# Patient Record
Sex: MALE | Race: WHITE | NOT HISPANIC OR LATINO | Employment: FULL TIME | URBAN - METROPOLITAN AREA
[De-identification: names, ages, dates, MRNs, and addresses within clinical notes are randomized per-mention and may not be internally consistent; named-entity substitution may affect disease eponyms.]

---

## 2022-11-15 ENCOUNTER — OFFICE VISIT (OUTPATIENT)
Dept: FAMILY MEDICINE CLINIC | Facility: CLINIC | Age: 42
End: 2022-11-15

## 2022-11-15 VITALS
HEART RATE: 102 BPM | BODY MASS INDEX: 25.62 KG/M2 | TEMPERATURE: 97.8 F | DIASTOLIC BLOOD PRESSURE: 58 MMHG | OXYGEN SATURATION: 98 % | SYSTOLIC BLOOD PRESSURE: 92 MMHG | RESPIRATION RATE: 18 BRPM | WEIGHT: 173 LBS | HEIGHT: 69 IN

## 2022-11-15 DIAGNOSIS — E11.65 TYPE 2 DIABETES MELLITUS WITH HYPERGLYCEMIA, WITHOUT LONG-TERM CURRENT USE OF INSULIN: Primary | ICD-10-CM

## 2022-11-15 DIAGNOSIS — L02.414 CUTANEOUS ABSCESS OF LEFT UPPER EXTREMITY: ICD-10-CM

## 2022-11-15 PROCEDURE — 99204 OFFICE O/P NEW MOD 45 MIN: CPT | Performed by: NURSE PRACTITIONER

## 2022-11-15 RX ORDER — AMOXICILLIN AND CLAVULANATE POTASSIUM 875; 125 MG/1; MG/1
1 TABLET, FILM COATED ORAL EVERY 12 HOURS SCHEDULED
Qty: 20 TABLET | Refills: 0 | Status: SHIPPED | OUTPATIENT
Start: 2022-11-15 | End: 2022-11-16 | Stop reason: SDUPTHER

## 2022-11-15 NOTE — PROGRESS NOTES
Chief Complaint  Establish Care (Treatment for diabetes )    Subjective          Shayy presents to [facility]  Is a 41-year-old male to establish care, obtain labs, follow-up on diabetes.  Overall doing well    Shayy Ellison 41 y.o. male who presents today for routine follow up check and medication refills.  he has a history of There is no problem list on file for this patient.  .  Since the last visit, he has overall felt well.  he has been compliant with current meds.  he denies medication side effects.  Glucose control with medication is control uncertain   The last HgbA1C result was No results found for: HGBA1C.  The last dilated eye exam was 2020  States that he has not been to the doctor in several years.  Is not currently taking any medication for his diabetes.  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  States that he does check his blood sugar occasionally its been around 150 when he checks it  He denies any polyuria polydipsia or polyphagia  He is unsure what medication he was on in the past    He does have a swollen bump on his left elbow.  He states that he was in bed a couple of nights ago and felt something on his elbow he is unsure if he got bit or hit his elbow on something.  He has had some redness and swelling no drainage or fever.  He has not taken any medicine for this    He denies any other significant medical history  He has not had any recent lab work is fasting today and agreeable to labs    The following portions of the patient's history were reviewed and updated as appropriate: allergies, current medications, past family history, past medical history, past social history, past surgical history, and problem list        Review of Systems   Constitutional: Negative for chills, fatigue and fever.   Eyes: Negative for visual disturbance.   Respiratory: Negative for cough, shortness of breath and wheezing.   "  Cardiovascular: Negative for chest pain, palpitations and leg swelling.   Gastrointestinal: Negative for abdominal pain, diarrhea, nausea and vomiting.   Endocrine: Negative for polydipsia, polyphagia and polyuria.   Skin: Positive for wound.   Neurological: Negative for dizziness and light-headedness.        Objective   Vital Signs:   Vitals:    11/15/22 0932   BP: 92/58   Pulse: 102   Resp: 18   Temp: 97.8 °F (36.6 °C)   SpO2: 98%   Weight: 78.5 kg (173 lb)   Height: 175.3 cm (69\")        BMI is >= 25 and <30. (Overweight) The following options were offered after discussion;: weight loss educational material (shared in after visit summary)        Physical Exam  Vitals reviewed.   Constitutional:       General: He is not in acute distress.  Eyes:      Conjunctiva/sclera: Conjunctivae normal.   Neck:      Thyroid: No thyromegaly.      Vascular: No carotid bruit.   Cardiovascular:      Rate and Rhythm: Normal rate and regular rhythm.      Heart sounds: Normal heart sounds.   Pulmonary:      Effort: Pulmonary effort is normal.      Breath sounds: Normal breath sounds.   Skin:     Findings: Abscess present.             Comments: Small skin abscess mild erythema and swelling around site.  No warmth or fever or drainage   Neurological:      Mental Status: He is alert.   Psychiatric:         Attention and Perception: Attention normal.         Mood and Affect: Mood normal.          Result Review :     The following data was reviewed by: ZULY Ashley on 11/15/2022:  No recent labs to review fasting and agreeable today         Assessment and Plan    Diagnoses and all orders for this visit:    1. Type 2 diabetes mellitus with hyperglycemia, without long-term current use of insulin (HCC) (Primary)  Comments:  Check hemoglobin A1c today  Monitor blood sugars closely bring log to next visit  Orders:  -     Comprehensive Metabolic Panel  -     CBC & Differential  -     Lipid Panel  -     TSH  -     T4, Free  -     " Hemoglobin A1c    2. Cutaneous abscess of left upper extremity  Comments:  Take medication as directed  Follow-up immediately with any fever increased swelling redness or drainage  Orders:  -     Comprehensive Metabolic Panel  -     CBC & Differential  -     Lipid Panel  -     TSH  -     T4, Free  -     Hemoglobin A1c  -     amoxicillin-clavulanate (Augmentin) 875-125 MG per tablet; Take 1 tablet by mouth Every 12 (Twelve) Hours. One PO BID for infection with food  Dispense: 20 tablet; Refill: 0        Follow Up   Return in about 6 months (around 5/15/2023), or if symptoms worsen or fail to improve, for Annual physical.  Patient was given instructions and counseling regarding his condition or for health maintenance advice. Please see specific information pulled into the AVS if appropriate.

## 2022-11-16 ENCOUNTER — TELEPHONE (OUTPATIENT)
Dept: FAMILY MEDICINE CLINIC | Facility: CLINIC | Age: 42
End: 2022-11-16

## 2022-11-16 ENCOUNTER — DOCUMENTATION (OUTPATIENT)
Dept: FAMILY MEDICINE CLINIC | Facility: CLINIC | Age: 42
End: 2022-11-16

## 2022-11-16 DIAGNOSIS — E11.65 TYPE 2 DIABETES MELLITUS WITH HYPERGLYCEMIA, WITHOUT LONG-TERM CURRENT USE OF INSULIN: ICD-10-CM

## 2022-11-16 DIAGNOSIS — L02.414 CUTANEOUS ABSCESS OF LEFT UPPER EXTREMITY: ICD-10-CM

## 2022-11-16 LAB
ALBUMIN SERPL-MCNC: 4.7 G/DL (ref 3.5–5.2)
ALBUMIN/GLOB SERPL: 2.2 G/DL
ALP SERPL-CCNC: 70 U/L (ref 39–117)
ALT SERPL-CCNC: 7 U/L (ref 1–41)
AST SERPL-CCNC: 13 U/L (ref 1–40)
BASOPHILS # BLD AUTO: 0.06 10*3/MM3 (ref 0–0.2)
BASOPHILS NFR BLD AUTO: 0.3 % (ref 0–1.5)
BILIRUB SERPL-MCNC: 2.9 MG/DL (ref 0–1.2)
BUN SERPL-MCNC: 12 MG/DL (ref 6–20)
BUN/CREAT SERPL: 8.5 (ref 7–25)
CALCIUM SERPL-MCNC: 8.9 MG/DL (ref 8.6–10.5)
CHLORIDE SERPL-SCNC: 90 MMOL/L (ref 98–107)
CHOLEST SERPL-MCNC: 215 MG/DL (ref 0–200)
CO2 SERPL-SCNC: 24.6 MMOL/L (ref 22–29)
CREAT SERPL-MCNC: 1.41 MG/DL (ref 0.76–1.27)
EGFRCR SERPLBLD CKD-EPI 2021: 64.6 ML/MIN/1.73
EOSINOPHIL # BLD AUTO: 0.11 10*3/MM3 (ref 0–0.4)
EOSINOPHIL NFR BLD AUTO: 0.6 % (ref 0.3–6.2)
ERYTHROCYTE [DISTWIDTH] IN BLOOD BY AUTOMATED COUNT: 12.6 % (ref 12.3–15.4)
GLOBULIN SER CALC-MCNC: 2.1 GM/DL
GLUCOSE SERPL-MCNC: 434 MG/DL (ref 65–99)
HBA1C MFR BLD: 10.3 % (ref 4.8–5.6)
HCT VFR BLD AUTO: 48.5 % (ref 37.5–51)
HDLC SERPL-MCNC: 36 MG/DL (ref 40–60)
HGB BLD-MCNC: 16.4 G/DL (ref 13–17.7)
IMM GRANULOCYTES # BLD AUTO: 0.08 10*3/MM3 (ref 0–0.05)
IMM GRANULOCYTES NFR BLD AUTO: 0.4 % (ref 0–0.5)
LDLC SERPL CALC-MCNC: 130 MG/DL (ref 0–100)
LYMPHOCYTES # BLD AUTO: 1.49 10*3/MM3 (ref 0.7–3.1)
LYMPHOCYTES NFR BLD AUTO: 8.3 % (ref 19.6–45.3)
MCH RBC QN AUTO: 29.8 PG (ref 26.6–33)
MCHC RBC AUTO-ENTMCNC: 33.8 G/DL (ref 31.5–35.7)
MCV RBC AUTO: 88.2 FL (ref 79–97)
MONOCYTES # BLD AUTO: 1.11 10*3/MM3 (ref 0.1–0.9)
MONOCYTES NFR BLD AUTO: 6.2 % (ref 5–12)
NEUTROPHILS # BLD AUTO: 15.08 10*3/MM3 (ref 1.7–7)
NEUTROPHILS NFR BLD AUTO: 84.2 % (ref 42.7–76)
NRBC BLD AUTO-RTO: 0 /100 WBC (ref 0–0.2)
PLATELET # BLD AUTO: 227 10*3/MM3 (ref 140–450)
POTASSIUM SERPL-SCNC: 4.3 MMOL/L (ref 3.5–5.2)
PROT SERPL-MCNC: 6.8 G/DL (ref 6–8.5)
RBC # BLD AUTO: 5.5 10*6/MM3 (ref 4.14–5.8)
SODIUM SERPL-SCNC: 132 MMOL/L (ref 136–145)
T4 FREE SERPL-MCNC: 1.53 NG/DL (ref 0.93–1.7)
TRIGL SERPL-MCNC: 272 MG/DL (ref 0–150)
TSH SERPL DL<=0.005 MIU/L-ACNC: 0.22 UIU/ML (ref 0.27–4.2)
VLDLC SERPL CALC-MCNC: 49 MG/DL (ref 5–40)
WBC # BLD AUTO: 17.93 10*3/MM3 (ref 3.4–10.8)

## 2022-11-16 RX ORDER — METFORMIN HYDROCHLORIDE 500 MG/1
500 TABLET, EXTENDED RELEASE ORAL 2 TIMES DAILY WITH MEALS
Qty: 180 TABLET | Refills: 1 | Status: SHIPPED | OUTPATIENT
Start: 2022-11-16 | End: 2022-11-16 | Stop reason: SDUPTHER

## 2022-11-16 RX ORDER — METFORMIN HYDROCHLORIDE 500 MG/1
500 TABLET, EXTENDED RELEASE ORAL 2 TIMES DAILY WITH MEALS
Qty: 180 TABLET | Refills: 1 | Status: SHIPPED | OUTPATIENT
Start: 2022-11-16 | End: 2023-03-27 | Stop reason: SDUPTHER

## 2022-11-16 RX ORDER — AMOXICILLIN AND CLAVULANATE POTASSIUM 875; 125 MG/1; MG/1
1 TABLET, FILM COATED ORAL EVERY 12 HOURS SCHEDULED
Qty: 20 TABLET | Refills: 0 | Status: SHIPPED | OUTPATIENT
Start: 2022-11-16 | End: 2023-03-27

## 2022-11-16 NOTE — PROGRESS NOTES
Glucose 434 left phone message go to ER if sweats , confusion, polydipsia, polyuria. Glucose critical high

## 2022-11-16 NOTE — TELEPHONE ENCOUNTER
Caller: Shayy Ellison    Relationship: Self    Best call back number:[phone]    What is the best time to reach you: ANYTIME  Who are you requesting to speak with (clinical staff, provider,  specific staff member): CLINICAL STAFF  Do you know the name of the person who called: SELF  What was the call regarding: PATIENT CALLED AND STATED HE WOULD LIKE TO KNOW WHAT MEDICINE YOU CALLED IN TO HIS PHARMACY.   Do you require a callback: YES

## 2023-03-27 ENCOUNTER — TELEPHONE (OUTPATIENT)
Dept: ENDOCRINOLOGY | Age: 43
End: 2023-03-27

## 2023-03-27 ENCOUNTER — OFFICE VISIT (OUTPATIENT)
Dept: ENDOCRINOLOGY | Age: 43
End: 2023-03-27
Payer: COMMERCIAL

## 2023-03-27 VITALS
SYSTOLIC BLOOD PRESSURE: 110 MMHG | WEIGHT: 163 LBS | BODY MASS INDEX: 24.14 KG/M2 | HEIGHT: 69 IN | DIASTOLIC BLOOD PRESSURE: 70 MMHG | OXYGEN SATURATION: 95 % | HEART RATE: 87 BPM | TEMPERATURE: 96.2 F

## 2023-03-27 DIAGNOSIS — E11.9 TYPE 2 DIABETES MELLITUS WITHOUT COMPLICATION, WITHOUT LONG-TERM CURRENT USE OF INSULIN: Primary | ICD-10-CM

## 2023-03-27 DIAGNOSIS — E11.65 TYPE 2 DIABETES MELLITUS WITH HYPERGLYCEMIA, WITHOUT LONG-TERM CURRENT USE OF INSULIN: ICD-10-CM

## 2023-03-27 DIAGNOSIS — E11.65 TYPE 2 DIABETES MELLITUS WITH HYPERGLYCEMIA, WITHOUT LONG-TERM CURRENT USE OF INSULIN: Primary | ICD-10-CM

## 2023-03-27 DIAGNOSIS — N52.9 ERECTILE DYSFUNCTION, UNSPECIFIED ERECTILE DYSFUNCTION TYPE: ICD-10-CM

## 2023-03-27 PROCEDURE — 99244 OFF/OP CNSLTJ NEW/EST MOD 40: CPT | Performed by: INTERNAL MEDICINE

## 2023-03-27 RX ORDER — METFORMIN HYDROCHLORIDE 500 MG/1
1000 TABLET, EXTENDED RELEASE ORAL
Qty: 180 TABLET | Refills: 1 | Status: SHIPPED | OUTPATIENT
Start: 2023-03-27 | End: 2023-09-23

## 2023-03-27 RX ORDER — CANAGLIFLOZIN 100 MG/1
100 TABLET, FILM COATED ORAL DAILY
Qty: 30 TABLET | Refills: 5 | Status: SHIPPED | OUTPATIENT
Start: 2023-03-27

## 2023-03-27 RX ORDER — DAPAGLIFLOZIN 5 MG/1
5 TABLET, FILM COATED ORAL DAILY
Qty: 90 TABLET | Refills: 1 | Status: SHIPPED | OUTPATIENT
Start: 2023-03-27 | End: 2023-03-27 | Stop reason: CLARIF

## 2023-03-27 RX ORDER — SILDENAFIL 50 MG/1
50 TABLET, FILM COATED ORAL DAILY PRN
Qty: 90 TABLET | Refills: 1 | Status: SHIPPED | OUTPATIENT
Start: 2023-03-27

## 2023-03-27 NOTE — TELEPHONE ENCOUNTER
[facility] CALLED AND IS REQUESTING AN ALT MED FOR THE [facility] DUE TO THE PT DOES NOT HAVE ANY PHARMACY COVERAGE AND HE WILL HAVE TO PAY OVER $500.00 FOR THE FARXIGA.          [facility] [address] [place] [address] [phone] [facility] [phone] FX          room air

## 2023-03-27 NOTE — PROGRESS NOTES
Referring provider: Una Gonsales    Reason for consult:  T2DM    HPI:  - 42 year old male here for management of diabetes mellitus type 2  - Has had diabetes since 2011  - No known complications to date  - Is currently taking metformin 500 mg XR  - He states he has not managed his diabetes since his diagnosis and typically not taken his prescribed medications but recently he is taking his medication  - He complains of epigastric pain relieved by PPI, poor healing, and erectile dysfunction  - He smokes 4 packs per day  - He states his mother, father, and siblings all have diabetes mellitus      The following portions of the patient's history were reviewed and updated as appropriate: allergies, current medications, past family history, past medical history, past social history, past surgical history and problem list.    Review of Systems   Constitutional: Positive for fatigue.   Respiratory: Negative.    Cardiovascular: Negative.    Gastrointestinal: Positive for abdominal pain.   Genitourinary: Positive for erectile dysfunction.   Psychiatric/Behavioral: The patient is nervous/anxious.        Objective     Vitals:    03/27/23 0853   BP: 110/70   Pulse: 87   Temp: 96.2 °F (35.7 °C)   SpO2: 95%        Physical Exam  Constitutional:       Appearance: Normal appearance.   HENT:      Head: Normocephalic and atraumatic.   Eyes:      General: No scleral icterus.     Conjunctiva/sclera: Conjunctivae normal.   Pulmonary:      Effort: Pulmonary effort is normal. No respiratory distress.   Neurological:      Mental Status: He is alert.      Gait: Gait normal.   Psychiatric:         Mood and Affect: Mood normal.         Behavior: Behavior normal.         Thought Content: Thought content normal.         Labs/Imaging:  A1c was 10.3% and TSH was 0.217 in 11/2022    Assessment & Plan   1. Uncontrolled type 2 DM due to hyperglycemia  - Cont. Metformin XR 1 g daily  - Start Farxiga 5 mg daily (discussed side effects of  SGLT2 inhibitors)  - Discussed dietary changes to help with glycemic control    2. ED  - Perscribed sildenafil  - His poor glycemic control  And smoking is contributing    3. Smoking  - Discussed smoking cessation    - Return to clinic in 1 month

## 2023-03-28 ENCOUNTER — PATIENT ROUNDING (BHMG ONLY) (OUTPATIENT)
Dept: ENDOCRINOLOGY | Age: 43
End: 2023-03-28
Payer: COMMERCIAL

## 2023-12-07 ENCOUNTER — TELEPHONE (OUTPATIENT)
Dept: FAMILY MEDICINE CLINIC | Facility: CLINIC | Age: 43
End: 2023-12-07
Payer: COMMERCIAL

## 2023-12-07 ENCOUNTER — OFFICE VISIT (OUTPATIENT)
Dept: FAMILY MEDICINE CLINIC | Facility: CLINIC | Age: 43
End: 2023-12-07
Payer: COMMERCIAL

## 2023-12-07 ENCOUNTER — TELEPHONE (OUTPATIENT)
Dept: FAMILY MEDICINE CLINIC | Facility: CLINIC | Age: 43
End: 2023-12-07

## 2023-12-07 VITALS
HEIGHT: 69 IN | OXYGEN SATURATION: 97 % | TEMPERATURE: 97.9 F | DIASTOLIC BLOOD PRESSURE: 80 MMHG | WEIGHT: 175.8 LBS | HEART RATE: 95 BPM | BODY MASS INDEX: 26.04 KG/M2 | SYSTOLIC BLOOD PRESSURE: 126 MMHG

## 2023-12-07 DIAGNOSIS — K21.9 GASTROESOPHAGEAL REFLUX DISEASE, UNSPECIFIED WHETHER ESOPHAGITIS PRESENT: ICD-10-CM

## 2023-12-07 DIAGNOSIS — N52.9 ERECTILE DYSFUNCTION, UNSPECIFIED ERECTILE DYSFUNCTION TYPE: ICD-10-CM

## 2023-12-07 DIAGNOSIS — E11.65 TYPE 2 DIABETES MELLITUS WITH HYPERGLYCEMIA, WITHOUT LONG-TERM CURRENT USE OF INSULIN: ICD-10-CM

## 2023-12-07 DIAGNOSIS — K21.9 GASTROESOPHAGEAL REFLUX DISEASE, UNSPECIFIED WHETHER ESOPHAGITIS PRESENT: Primary | ICD-10-CM

## 2023-12-07 PROCEDURE — 99214 OFFICE O/P EST MOD 30 MIN: CPT | Performed by: NURSE PRACTITIONER

## 2023-12-07 RX ORDER — METFORMIN HYDROCHLORIDE 500 MG/1
1000 TABLET, EXTENDED RELEASE ORAL 2 TIMES DAILY WITH MEALS
Qty: 360 TABLET | Refills: 1 | Status: SHIPPED | OUTPATIENT
Start: 2023-12-07 | End: 2024-06-04

## 2023-12-07 RX ORDER — CANAGLIFLOZIN 100 MG/1
100 TABLET, FILM COATED ORAL DAILY
Qty: 90 TABLET | Refills: 2 | Status: SHIPPED | OUTPATIENT
Start: 2023-12-07 | End: 2023-12-07 | Stop reason: SDUPTHER

## 2023-12-07 RX ORDER — FLASH GLUCOSE SCANNING READER
1 EACH MISCELLANEOUS TAKE AS DIRECTED
Qty: 1 EACH | Refills: 0 | Status: SHIPPED | OUTPATIENT
Start: 2023-12-07

## 2023-12-07 RX ORDER — METFORMIN HYDROCHLORIDE 500 MG/1
1000 TABLET, EXTENDED RELEASE ORAL
Qty: 180 TABLET | Refills: 1 | Status: SHIPPED | OUTPATIENT
Start: 2023-12-07 | End: 2023-12-07 | Stop reason: SDUPTHER

## 2023-12-07 RX ORDER — SEMAGLUTIDE 1.34 MG/ML
0.5 INJECTION, SOLUTION SUBCUTANEOUS WEEKLY
Qty: 3 ML | Refills: 2 | Status: SHIPPED | OUTPATIENT
Start: 2023-12-07 | End: 2023-12-07 | Stop reason: SDUPTHER

## 2023-12-07 RX ORDER — OMEPRAZOLE 20 MG/1
20 CAPSULE, DELAYED RELEASE ORAL NIGHTLY
Qty: 90 CAPSULE | Refills: 1 | Status: SHIPPED | OUTPATIENT
Start: 2023-12-07 | End: 2024-06-04

## 2023-12-07 RX ORDER — TADALAFIL 10 MG/1
10 TABLET ORAL DAILY PRN
Qty: 30 TABLET | Refills: 1 | Status: SHIPPED | OUTPATIENT
Start: 2023-12-07 | End: 2023-12-07 | Stop reason: SDUPTHER

## 2023-12-07 RX ORDER — SILDENAFIL 50 MG/1
50 TABLET, FILM COATED ORAL DAILY PRN
Qty: 90 TABLET | Refills: 1 | Status: SHIPPED | OUTPATIENT
Start: 2023-12-07 | End: 2023-12-07

## 2023-12-07 RX ORDER — FLASH GLUCOSE SCANNING READER
1 EACH MISCELLANEOUS TAKE AS DIRECTED
Qty: 1 EACH | Refills: 0 | Status: SHIPPED | OUTPATIENT
Start: 2023-12-07 | End: 2023-12-07 | Stop reason: SDUPTHER

## 2023-12-07 RX ORDER — OMEPRAZOLE 20 MG/1
20 CAPSULE, DELAYED RELEASE ORAL NIGHTLY
Qty: 90 CAPSULE | Refills: 1 | Status: SHIPPED | OUTPATIENT
Start: 2023-12-07 | End: 2023-12-07 | Stop reason: SDUPTHER

## 2023-12-07 RX ORDER — TADALAFIL 10 MG/1
10 TABLET ORAL DAILY PRN
Qty: 30 TABLET | Refills: 1 | Status: SHIPPED | OUTPATIENT
Start: 2023-12-07 | End: 2024-06-04

## 2023-12-07 RX ORDER — CANAGLIFLOZIN 100 MG/1
100 TABLET, FILM COATED ORAL DAILY
Qty: 90 TABLET | Refills: 2 | Status: SHIPPED | OUTPATIENT
Start: 2023-12-07 | End: 2024-09-02

## 2023-12-07 RX ORDER — METFORMIN HYDROCHLORIDE 500 MG/1
1000 TABLET, EXTENDED RELEASE ORAL 2 TIMES DAILY WITH MEALS
Qty: 360 TABLET | Refills: 1 | Status: SHIPPED | OUTPATIENT
Start: 2023-12-07 | End: 2023-12-07 | Stop reason: SDUPTHER

## 2023-12-07 RX ORDER — SEMAGLUTIDE 1.34 MG/ML
0.5 INJECTION, SOLUTION SUBCUTANEOUS WEEKLY
Qty: 3 ML | Refills: 2 | Status: SHIPPED | OUTPATIENT
Start: 2023-12-07 | End: 2024-06-04

## 2023-12-07 NOTE — PROGRESS NOTES
Chief Complaint  Follow-up (Type 2 diabetes, and ED)    Bubba Lucas presents to [facility] as a 42-year-old male for follow-up type 2 diabetes, GERD, ED.  To refill medications, order labs.  Overall doing okay    Type 2 diabetes    .  Since the last visit, he has overall felt well.  he has been compliant with current meds.  he denies medication side effects.  Glucose control with medication is patient poorly compliant and poorly controlled   The last HgbA1C result was   Lab Results   Component Value Date    HGBA1C 10.80 (H) 04/17/2023   .  The last dilated eye exam was 2023  He is having some polyuria no polydipsia or polyphagia  He did not bring any blood sugar logs with him today  States that he is out of all of his medication  He is a  and finds it hard to get his medications on time and to follow-up diabetic diet  He does see endocrinology his last appointment was 4/2023.  He does have an upcoming appointment  Denies any open ulcerations no numbness and tingling    History of ED-he has tried Viagra but states it does not work well for him  Denies any side effects would like to try Cialis    GERD-he does drink a lot of caffeine  Has never tried any PPIs  Takes over-the-counter Tums as needed      He is fasting and agreeable to labs today    He has no other acute complaints    The following portions of the patient's history were reviewed and updated as appropriate: allergies, current medications, past family history, past medical history, past social history, past surgical history, and problem list  .      Review of Systems   Constitutional:  Negative for chills, fatigue and fever.   HENT:  Negative for congestion.    Eyes:  Negative for visual disturbance.   Respiratory:  Negative for cough, shortness of breath and wheezing.    Cardiovascular:  Negative for chest pain, palpitations and leg swelling.   Gastrointestinal:  Negative for abdominal pain,  "diarrhea, nausea and vomiting.   Endocrine: Positive for polyuria. Negative for polydipsia and polyphagia.   Skin:  Negative for rash.   Neurological:  Negative for dizziness and light-headedness.   Psychiatric/Behavioral:  Negative for self-injury and suicidal ideas.         Objective   Vital Signs:   Vitals:    12/07/23 1256   BP: 126/80   Pulse: 95   Temp: 97.9 °F (36.6 °C)   SpO2: 97%   Weight: 79.7 kg (175 lb 12.8 oz)   Height: 175.3 cm (69.02\")   PainSc: 0-No pain        BMI is >= 25 and <30. (Overweight) The following options were offered after discussion;: weight loss educational material (shared in after visit summary)        Physical Exam  Vitals reviewed.   Constitutional:       General: He is not in acute distress.  Eyes:      Conjunctiva/sclera: Conjunctivae normal.   Neck:      Thyroid: No thyromegaly.      Vascular: No carotid bruit.   Cardiovascular:      Rate and Rhythm: Normal rate and regular rhythm.      Heart sounds: Normal heart sounds.   Pulmonary:      Effort: Pulmonary effort is normal. No respiratory distress.      Breath sounds: Normal breath sounds. No stridor. No wheezing, rhonchi or rales.   Chest:      Chest wall: No tenderness.   Neurological:      Mental Status: He is alert.   Psychiatric:         Attention and Perception: Attention normal.         Mood and Affect: Mood normal.          Result Review :     The following data was reviewed by: ZULY Ashley on 12/07/2023:      Basic Metabolic Panel (04/17/2023 10:11) glucose 302    Hemoglobin A1c (04/17/2023 10:11) 10.8  Hemoglobin A1c (11/15/2022 09:57)  T4, Free (11/15/2022 09:57)  TSH (11/15/2022 09:57)  Lipid Panel (11/15/2022 09:57)  CBC & Differential (11/15/2022 09:57)  Comprehensive Metabolic Panel (11/15/2022 09:57)   Blood sugar and Ha1c very high  Need to restart medication as soon as possible  Will send metformin to pharmacy  take 2 time daily  Recheck in 3 months  Monitor blood sugars closely        WBC count " elevated-  probably from elbow infection-  take all antibiotics  we will repeat with next labs  Follow up with any fever, or increased swelling/drainage  Assessment and Plan    Diagnoses and all orders for this visit:    1. Gastroesophageal reflux disease, unspecified whether esophagitis present (Primary)  Comments:  Trial omeprazole  Avoid triggers including caffeine, nicotine, alcohol, spicy foods, red sauce  Orders:  -     omeprazole (priLOSEC) 20 MG capsule; Take 1 capsule by mouth Every Night for 180 days.  Dispense: 90 capsule; Refill: 1    2. Type 2 diabetes mellitus with hyperglycemia, without long-term current use of insulin  Comments:  Recheck hemoglobin A1c today  Monitor blood sugars closely bring log to next visit  Orders:  -     Canagliflozin (Invokana) 100 MG tablet tablet; Take 1 tablet by mouth Daily for 270 days.  Dispense: 90 tablet; Refill: 2  -     Discontinue: metFORMIN ER (GLUCOPHAGE-XR) 500 MG 24 hr tablet; Take 2 tablets by mouth Daily With Breakfast for 180 days.  Dispense: 180 tablet; Refill: 1  -     Comprehensive Metabolic Panel  -     CBC & Differential  -     Lipid Panel  -     TSH  -     Hemoglobin A1c  -     metFORMIN ER (GLUCOPHAGE-XR) 500 MG 24 hr tablet; Take 2 tablets by mouth 2 (Two) Times a Day With Meals for 180 days.  Dispense: 360 tablet; Refill: 1  -     Ozempic, 0.25 or 0.5 MG/DOSE, 2 MG/1.5ML solution pen-injector; Inject 0.5 mg under the skin into the appropriate area as directed 1 (One) Time Per Week for 180 days.  Dispense: 3 mL; Refill: 2  -     Continuous Blood Gluc Sensor (FreeStyle Rochelle 2 Sensor) misc; Use 1 each Every 14 (Fourteen) Days for 90 days.  Dispense: 4 each; Refill: 3  -     Continuous Blood Gluc  (FreeStyle Rochelle 14 Day [place]) device; Use 1 each Take As Directed.  Dispense: 1 each; Refill: 0    3. Erectile dysfunction, unspecified erectile dysfunction type  Comments:  viagra   no med SE  not working well-  would like to try Cialis  Orders:  -      Discontinue: sildenafil (Viagra) 50 MG tablet; Take 1 tablet by mouth Daily As Needed for Erectile Dysfunction.  Dispense: 90 tablet; Refill: 1  -     Comprehensive Metabolic Panel  -     CBC & Differential  -     Lipid Panel  -     TSH  -     Hemoglobin A1c  -     tadalafil (Cialis) 10 MG tablet; Take 1 tablet by mouth Daily As Needed for Erectile Dysfunction for up to 180 days.  Dispense: 30 tablet; Refill: 1        Follow Up   Return if symptoms worsen or fail to improve.  Patient was given instructions and counseling regarding his condition or for health maintenance advice. Please see specific information pulled into the AVS if appropriate.

## 2023-12-07 NOTE — TELEPHONE ENCOUNTER
I sent patient a [employer] message with his list of medications and contact info for the [facility] they were sent to so [facility] could [facility]

## 2023-12-07 NOTE — TELEPHONE ENCOUNTER
Patient would like all his medications from today sent to [facility] [address] [place] [address] - [facility] - [phone] PH - [phone] FX [[id number]] instead of [facility].  Invokana, Metformin ER, Ozempic 0.25 dose, Freestyle Rochelle sensor and , Omeprazole 20 mg and Tadalafil 10 mg.    Patient would like to  his medications before he leaves town in 1.5 hours if possible.

## 2023-12-07 NOTE — TELEPHONE ENCOUNTER
Pt needs medication sent to [facility]  [address]  [address] [phone]    They went to [facility]

## 2023-12-08 DIAGNOSIS — R73.09 HEMOGLOBIN A1C 8.0% OR GREATER: ICD-10-CM

## 2023-12-08 DIAGNOSIS — E78.2 MIXED HYPERLIPIDEMIA: Primary | ICD-10-CM

## 2023-12-08 DIAGNOSIS — E11.65 TYPE 2 DIABETES MELLITUS WITH HYPERGLYCEMIA, WITHOUT LONG-TERM CURRENT USE OF INSULIN: Primary | ICD-10-CM

## 2023-12-08 LAB
ALBUMIN SERPL-MCNC: 5.2 G/DL (ref 3.5–5.2)
ALBUMIN/GLOB SERPL: 2.7 G/DL
ALP SERPL-CCNC: 58 U/L (ref 39–117)
ALT SERPL-CCNC: 15 U/L (ref 1–41)
AST SERPL-CCNC: 15 U/L (ref 1–40)
BASOPHILS # BLD AUTO: 0.06 10*3/MM3 (ref 0–0.2)
BASOPHILS NFR BLD AUTO: 0.8 % (ref 0–1.5)
BILIRUB SERPL-MCNC: 1.4 MG/DL (ref 0–1.2)
BUN SERPL-MCNC: 14 MG/DL (ref 6–20)
BUN/CREAT SERPL: 16.3 (ref 7–25)
CALCIUM SERPL-MCNC: 9.3 MG/DL (ref 8.6–10.5)
CHLORIDE SERPL-SCNC: 97 MMOL/L (ref 98–107)
CHOLEST SERPL-MCNC: 257 MG/DL (ref 0–200)
CO2 SERPL-SCNC: 25.4 MMOL/L (ref 22–29)
CREAT SERPL-MCNC: 0.86 MG/DL (ref 0.76–1.27)
EGFRCR SERPLBLD CKD-EPI 2021: 111.6 ML/MIN/1.73
EOSINOPHIL # BLD AUTO: 0.16 10*3/MM3 (ref 0–0.4)
EOSINOPHIL NFR BLD AUTO: 2.1 % (ref 0.3–6.2)
ERYTHROCYTE [DISTWIDTH] IN BLOOD BY AUTOMATED COUNT: 12.7 % (ref 12.3–15.4)
GLOBULIN SER CALC-MCNC: 1.9 GM/DL
GLUCOSE SERPL-MCNC: 479 MG/DL (ref 65–99)
HBA1C MFR BLD: 11.5 % (ref 4.8–5.6)
HCT VFR BLD AUTO: 44.9 % (ref 37.5–51)
HDLC SERPL-MCNC: 34 MG/DL (ref 40–60)
HGB BLD-MCNC: 15 G/DL (ref 13–17.7)
IMM GRANULOCYTES # BLD AUTO: 0.03 10*3/MM3 (ref 0–0.05)
IMM GRANULOCYTES NFR BLD AUTO: 0.4 % (ref 0–0.5)
LDLC SERPL CALC-MCNC: 129 MG/DL (ref 0–100)
LYMPHOCYTES # BLD AUTO: 1.94 10*3/MM3 (ref 0.7–3.1)
LYMPHOCYTES NFR BLD AUTO: 25.5 % (ref 19.6–45.3)
MCH RBC QN AUTO: 28.5 PG (ref 26.6–33)
MCHC RBC AUTO-ENTMCNC: 33.4 G/DL (ref 31.5–35.7)
MCV RBC AUTO: 85.4 FL (ref 79–97)
MONOCYTES # BLD AUTO: 0.44 10*3/MM3 (ref 0.1–0.9)
MONOCYTES NFR BLD AUTO: 5.8 % (ref 5–12)
NEUTROPHILS # BLD AUTO: 4.98 10*3/MM3 (ref 1.7–7)
NEUTROPHILS NFR BLD AUTO: 65.4 % (ref 42.7–76)
NRBC BLD AUTO-RTO: 0 /100 WBC (ref 0–0.2)
PLATELET # BLD AUTO: 300 10*3/MM3 (ref 140–450)
POTASSIUM SERPL-SCNC: 4.1 MMOL/L (ref 3.5–5.2)
PROT SERPL-MCNC: 7.1 G/DL (ref 6–8.5)
RBC # BLD AUTO: 5.26 10*6/MM3 (ref 4.14–5.8)
SODIUM SERPL-SCNC: 135 MMOL/L (ref 136–145)
TRIGL SERPL-MCNC: 514 MG/DL (ref 0–150)
TSH SERPL DL<=0.005 MIU/L-ACNC: 0.43 UIU/ML (ref 0.27–4.2)
VLDLC SERPL CALC-MCNC: 94 MG/DL (ref 5–40)
WBC # BLD AUTO: 7.61 10*3/MM3 (ref 3.4–10.8)

## 2023-12-08 RX ORDER — ATORVASTATIN CALCIUM 20 MG/1
20 TABLET, FILM COATED ORAL NIGHTLY
Qty: 90 TABLET | Refills: 1 | Status: SHIPPED | OUTPATIENT
Start: 2023-12-08 | End: 2024-06-05

## 2023-12-08 NOTE — PROGRESS NOTES
Please call patient to discuss and make sure he understands:  Just left Voice Mail-  also tried to call last night-  send text via Doxy also      Good morning Ermin,  Your lab results are back  And try to check in with you last night for the critical results of your blood sugar  We need to restart your medication and make sure that you follow-up with endocrinology  Also put a referral in for diabetes educator to help you maybe get set up better with a monitoring system to help you monitor your blood sugar more closely  Your hemoglobin A1c has gone up again to 11.5  We really need to discuss trying that new Ozempic or initiating insulin  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  Decrease/eliminate soda, caffeine, alcohol and overall caloric intake. Reduce carbohydrates and sweets in diet.  Continue to improve dietary habits with lean proteins, fresh vegetables, fruits, and nuts. Improve aerobic exercise: walking/biking/swimming daily as tolerated, recommend 30 minutes/day at least.       All your other labs look okay    We need to repeat these in about 3 months  Let me know if you have any questions  Una

## 2023-12-11 ENCOUNTER — TELEPHONE (OUTPATIENT)
Dept: FAMILY MEDICINE CLINIC | Facility: CLINIC | Age: 43
End: 2023-12-11
Payer: COMMERCIAL

## 2023-12-11 DIAGNOSIS — E78.2 MIXED HYPERLIPIDEMIA: ICD-10-CM

## 2023-12-11 NOTE — TELEPHONE ENCOUNTER
----- Message from ZULY Ashley sent at 12/8/2023 11:00 AM EST -----      Please call patient to discuss and make sure he understands:  Just left Voice Mail-  also tried to call last night-  send text via Doxy also      Good morning Ermin,  Your lab results are back  And try to check in with you last night for the critical results of your blood sugar  We need to restart your medication and make sure that you follow-up with endocrinology  Also put a referral in for diabetes educator to help you maybe get set up better with a monitoring system to help you monitor your blood sugar more closely  Your hemoglobin A1c has gone up again to 11.5  We really need to discuss trying that new Ozempic or initiating insulin  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  Decrease/eliminate soda, caffeine, alcohol and overall caloric intake. Reduce carbohydrates and sweets in diet.  Continue to improve dietary habits with lean proteins, fresh vegetables, fruits, and nuts. Improve aerobic exercise: walking/biking/swimming daily as tolerated, recommend 30 minutes/day at least.       All your other labs look okay    We need to repeat these in about 3 months  Let me know if you have any questions  Una

## 2023-12-11 NOTE — TELEPHONE ENCOUNTER
Caller: Shayy Ellison    Relationship to patient: Self    Best call back number: [phone]      Patient is needing: PATIENT STATES THAT HE NEEDS HIS PRESCRIPTION FOR atorvastatin (LIPITOR) 20 MG tablet  MEDICATION TRANSFERRED TO [facility].    [facility] [address] [place] [address] - [address] [facility] [phone] [facility] [phone] FX       PLEASE ADVISE.

## 2023-12-12 RX ORDER — ATORVASTATIN CALCIUM 20 MG/1
20 TABLET, FILM COATED ORAL NIGHTLY
Qty: 90 TABLET | Refills: 1 | Status: SHIPPED | OUTPATIENT
Start: 2023-12-12 | End: 2024-06-09

## 2023-12-13 ENCOUNTER — OFFICE VISIT (OUTPATIENT)
Dept: FAMILY MEDICINE CLINIC | Facility: CLINIC | Age: 43
End: 2023-12-13
Payer: COMMERCIAL

## 2023-12-13 VITALS
OXYGEN SATURATION: 96 % | WEIGHT: 178 LBS | HEIGHT: 69 IN | HEART RATE: 90 BPM | DIASTOLIC BLOOD PRESSURE: 60 MMHG | RESPIRATION RATE: 16 BRPM | SYSTOLIC BLOOD PRESSURE: 100 MMHG | BODY MASS INDEX: 26.36 KG/M2

## 2023-12-13 DIAGNOSIS — E78.2 MIXED HYPERLIPIDEMIA: ICD-10-CM

## 2023-12-13 DIAGNOSIS — R73.09 HEMOGLOBIN A1C 8.0% OR GREATER: ICD-10-CM

## 2023-12-13 DIAGNOSIS — F41.9 ANXIETY: Primary | ICD-10-CM

## 2023-12-13 DIAGNOSIS — E11.65 TYPE 2 DIABETES MELLITUS WITH HYPERGLYCEMIA, WITHOUT LONG-TERM CURRENT USE OF INSULIN: ICD-10-CM

## 2023-12-13 DIAGNOSIS — N52.9 ERECTILE DYSFUNCTION, UNSPECIFIED ERECTILE DYSFUNCTION TYPE: ICD-10-CM

## 2023-12-13 PROCEDURE — 99214 OFFICE O/P EST MOD 30 MIN: CPT | Performed by: NURSE PRACTITIONER

## 2023-12-13 RX ORDER — SILDENAFIL 50 MG/1
50 TABLET, FILM COATED ORAL DAILY PRN
Qty: 30 TABLET | Refills: 1 | Status: SHIPPED | OUTPATIENT
Start: 2023-12-13 | End: 2024-03-12

## 2023-12-13 NOTE — PROGRESS NOTES
Chief Complaint  Anxiety    Subjective          Shayy presents to [facility] as a 42-year-old male for follow-up on increased anxiety/stress, to refill/discuss medications, order/review labs.  Overall doing okay      History of uncontrolled type 2 diabetes.  He was just seen in the office last week.  His hemoglobin A1c has increased to 11.5.  He has not been taking his medication as directed.  He did restart his metformin this week   He states that he is not able to afford any other medication at this time,    He continues to decline insulin  He was referred to endocrinology and diabetic educator  He does not regularly check his blood sugar.  He is a  and does not get to eat a good diet most of the time.  He does plan to follow-up with endocrinology.  We did stress importance of following medication regiment and monitoring blood sugar closely.  He is using a freestyle mariza sensor to see if this helps.  He does have some polyuria no polyphagia or polydipsia. no open ulcerations.  He does have some numbness and tingling in his legs    He is having some erectile dysfunction-he has tried Cialis.  He states that this did not work as well as Viagra and would like to switch back to this medication  He does understand that blood glucose can affect his erectile dysfunction  He would like to check his testosterone    He has been having some increased stress and anxiety.  He would like to try a medication today.  We did discuss side effects.  He is agreeable to trying sertraline  Denies any SI or HI  No previous medication or counseling he has no other acute complaints of today    The following portions of the patient's history were reviewed and updated as appropriate: allergies, current medications, past family history, past medical history, past social history, past surgical history, and problem list            Review of Systems   Constitutional:  Negative for chills, fatigue and  "fever.   Eyes:  Negative for visual disturbance.   Gastrointestinal:  Negative for abdominal pain, diarrhea, nausea and vomiting.   Endocrine: Positive for polyuria. Negative for polydipsia and polyphagia.   Skin:  Negative for rash.   Neurological:  Negative for dizziness and light-headedness.   Psychiatric/Behavioral:  Negative for self-injury and suicidal ideas.         Objective   Vital Signs:   Vitals:    12/13/23 1345   BP: 100/60   Pulse: 90   Resp: 16   SpO2: 96%   Weight: 80.7 kg (178 lb)   Height: 175.3 cm (69.02\")                  Physical Exam  Vitals reviewed.   Constitutional:       General: He is not in acute distress.  Eyes:      Conjunctiva/sclera: Conjunctivae normal.   Neck:      Thyroid: No thyromegaly.      Vascular: No carotid bruit.   Cardiovascular:      Rate and Rhythm: Normal rate and regular rhythm.      Heart sounds: Normal heart sounds.   Pulmonary:      Effort: Pulmonary effort is normal. No respiratory distress.      Breath sounds: Normal breath sounds. No stridor. No wheezing, rhonchi or rales.   Chest:      Chest wall: No tenderness.   Lymphadenopathy:      Cervical: No cervical adenopathy.   Neurological:      Mental Status: He is alert and oriented to person, place, and time.   Psychiatric:         Attention and Perception: Attention normal.         Mood and Affect: Mood normal.          Result Review :     The following data was reviewed by: ZULY Ashley on 12/13/2023:      Hemoglobin A1c (12/07/2023 13:46)  TSH (12/07/2023 13:46)  Lipid Panel (12/07/2023 13:46)  CBC & Differential (12/07/2023 13:46)  Comprehensive Metabolic Panel (12/07/2023 13:46)   Your lab results are back  And try to check in with you last night for the critical results of your blood sugar  We need to restart your medication and make sure that you follow-up with endocrinology  Also put a referral in for diabetes educator to help you maybe get set up better with a monitoring system to help you " monitor your blood sugar more closely  Your hemoglobin A1c has gone up again to 11.5  We really need to discuss trying that new Ozempic or initiating insulin  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  Decrease/eliminate soda, caffeine, alcohol and overall caloric intake. Reduce carbohydrates and sweets in diet.  Continue to improve dietary habits with lean proteins, fresh vegetables, fruits, and nuts. Improve aerobic exercise: walking/biking/swimming daily as tolerated, recommend 30 minutes/day at least.  Assessment and Plan    Diagnoses and all orders for this visit:    1. Anxiety (Primary)  Comments:  Trial sertraline  Good Rx card provided for cost management  Orders:  -     sertraline (Zoloft) 50 MG tablet; Take 1 tablet by mouth Daily for 60 days.  Dispense: 60 tablet; Refill: 1    2. Type 2 diabetes mellitus with hyperglycemia, without long-term current use of insulin  Comments:  Stressed importance of good glucose control  Try freestyle mariza  Restarted metformin last week  Continues to decline insulin  Follow-up with endocrinology    3. Erectile dysfunction, unspecified erectile dysfunction type  Comments:  Discussed ED and bS relationship  Orders:  -     Testosterone, Free, Total  -     sildenafil (VIAGRA) 50 MG tablet; Take 1 tablet by mouth Daily As Needed for Erectile Dysfunction for up to 90 days.  Dispense: 30 tablet; Refill: 1    4. Mixed hyperlipidemia  Comments:  Continue to work on lower cholesterol diet and exercise as tolerated  Goal is greater than heart 50 minutes moderate intensity weekly    5. Hemoglobin A1c 8.0% or greater  Comments:  Stressed importance of good glucose control  Needs to follow-up with diabetes educator and  endocrinology    Other orders  -     Discontinue: sertraline (Zoloft) 50 MG tablet; Take 1 tablet by mouth Daily for 60 days.  Dispense: 60 tablet; Refill: 1        Follow Up   Return in about 6 weeks (around 1/24/2024), or if symptoms worsen or fail to improve, for follow up anxiety.  Patient was given instructions and counseling regarding his condition or for health maintenance advice. Please see specific information pulled into the AVS if appropriate.

## 2023-12-16 LAB
TESTOST FREE SERPL-MCNC: 9.3 PG/ML (ref 6.8–21.5)
TESTOST SERPL-MCNC: 341 NG/DL (ref 264–916)

## 2024-08-05 ENCOUNTER — APPOINTMENT (OUTPATIENT)
Dept: GENERAL RADIOLOGY | Facility: HOSPITAL | Age: 44
DRG: 854 | End: 2024-08-05

## 2024-08-05 ENCOUNTER — HOSPITAL ENCOUNTER (INPATIENT)
Facility: HOSPITAL | Age: 44
LOS: 8 days | Discharge: HOME | DRG: 854 | End: 2024-08-13
Attending: EMERGENCY MEDICINE | Admitting: STUDENT IN AN ORGANIZED HEALTH CARE EDUCATION/TRAINING PROGRAM

## 2024-08-05 DIAGNOSIS — L03.115 CELLULITIS OF RIGHT FOOT: Primary | ICD-10-CM

## 2024-08-05 DIAGNOSIS — F41.9 ANXIETY: ICD-10-CM

## 2024-08-05 DIAGNOSIS — E11.628 DIABETIC FOOT INFECTION: ICD-10-CM

## 2024-08-05 DIAGNOSIS — D23.5 DERMOID CYST OF SKIN OF BACK: ICD-10-CM

## 2024-08-05 DIAGNOSIS — E11.65 UNCONTROLLED TYPE 2 DIABETES MELLITUS WITH HYPERGLYCEMIA: ICD-10-CM

## 2024-08-05 DIAGNOSIS — L08.9 DIABETIC FOOT INFECTION: ICD-10-CM

## 2024-08-05 DIAGNOSIS — L02.611 ABSCESS OF RIGHT FOOT: ICD-10-CM

## 2024-08-05 LAB
ALBUMIN SERPL-MCNC: 3.6 G/DL (ref 3.5–5.2)
ALBUMIN/GLOB SERPL: 1 G/DL
ALP SERPL-CCNC: 86 U/L (ref 39–117)
ALT SERPL W P-5'-P-CCNC: 7 U/L (ref 1–41)
ANION GAP SERPL CALCULATED.3IONS-SCNC: 13 MMOL/L (ref 5–15)
AST SERPL-CCNC: 6 U/L (ref 1–40)
BASOPHILS # BLD AUTO: 0.05 10*3/MM3 (ref 0–0.2)
BASOPHILS NFR BLD AUTO: 0.4 % (ref 0–1.5)
BILIRUB SERPL-MCNC: 0.5 MG/DL (ref 0–1.2)
BUN SERPL-MCNC: 14 MG/DL (ref 6–20)
BUN/CREAT SERPL: 18.2 (ref 7–25)
CALCIUM SPEC-SCNC: 9.2 MG/DL (ref 8.6–10.5)
CHLORIDE SERPL-SCNC: 98 MMOL/L (ref 98–107)
CO2 SERPL-SCNC: 22 MMOL/L (ref 22–29)
CREAT SERPL-MCNC: 0.77 MG/DL (ref 0.76–1.27)
CRP SERPL-MCNC: 23.66 MG/DL (ref 0–0.5)
D-LACTATE SERPL-SCNC: 1.4 MMOL/L (ref 0.5–2)
DEPRECATED RDW RBC AUTO: 39.6 FL (ref 37–54)
EGFRCR SERPLBLD CKD-EPI 2021: 114.6 ML/MIN/1.73
EOSINOPHIL # BLD AUTO: 0.29 10*3/MM3 (ref 0–0.4)
EOSINOPHIL NFR BLD AUTO: 2.1 % (ref 0.3–6.2)
ERYTHROCYTE [DISTWIDTH] IN BLOOD BY AUTOMATED COUNT: 13.2 % (ref 12.3–15.4)
ERYTHROCYTE [SEDIMENTATION RATE] IN BLOOD: 37 MM/HR (ref 0–15)
GLOBULIN UR ELPH-MCNC: 3.7 GM/DL
GLUCOSE BLDC GLUCOMTR-MCNC: 225 MG/DL (ref 70–130)
GLUCOSE BLDC GLUCOMTR-MCNC: 405 MG/DL (ref 70–130)
GLUCOSE SERPL-MCNC: 297 MG/DL (ref 65–99)
HCT VFR BLD AUTO: 38.6 % (ref 37.5–51)
HGB BLD-MCNC: 12.1 G/DL (ref 13–17.7)
IMM GRANULOCYTES # BLD AUTO: 0.08 10*3/MM3 (ref 0–0.05)
IMM GRANULOCYTES NFR BLD AUTO: 0.6 % (ref 0–0.5)
LYMPHOCYTES # BLD AUTO: 1.64 10*3/MM3 (ref 0.7–3.1)
LYMPHOCYTES NFR BLD AUTO: 12.1 % (ref 19.6–45.3)
MCH RBC QN AUTO: 25.7 PG (ref 26.6–33)
MCHC RBC AUTO-ENTMCNC: 31.3 G/DL (ref 31.5–35.7)
MCV RBC AUTO: 82 FL (ref 79–97)
MONOCYTES # BLD AUTO: 0.73 10*3/MM3 (ref 0.1–0.9)
MONOCYTES NFR BLD AUTO: 5.4 % (ref 5–12)
NEUTROPHILS NFR BLD AUTO: 10.77 10*3/MM3 (ref 1.7–7)
NEUTROPHILS NFR BLD AUTO: 79.4 % (ref 42.7–76)
NRBC BLD AUTO-RTO: 0 /100 WBC (ref 0–0.2)
PLATELET # BLD AUTO: 450 10*3/MM3 (ref 140–450)
PMV BLD AUTO: 8.6 FL (ref 6–12)
POTASSIUM SERPL-SCNC: 4.1 MMOL/L (ref 3.5–5.2)
PROT SERPL-MCNC: 7.3 G/DL (ref 6–8.5)
RBC # BLD AUTO: 4.71 10*6/MM3 (ref 4.14–5.8)
SODIUM SERPL-SCNC: 133 MMOL/L (ref 136–145)
WBC NRBC COR # BLD AUTO: 13.56 10*3/MM3 (ref 3.4–10.8)

## 2024-08-05 PROCEDURE — 63710000001 INSULIN LISPRO (HUMAN) PER 5 UNITS: Performed by: PODIATRIST

## 2024-08-05 PROCEDURE — 25010000002 VANCOMYCIN 10 G RECONSTITUTED SOLUTION

## 2024-08-05 PROCEDURE — 25810000003 SODIUM CHLORIDE 0.9 % SOLUTION

## 2024-08-05 PROCEDURE — 25010000002 HYDROMORPHONE PER 4 MG

## 2024-08-05 PROCEDURE — 25010000002 PIPERACILLIN SOD-TAZOBACTAM PER 1 G: Performed by: PHYSICIAN ASSISTANT

## 2024-08-05 PROCEDURE — 25010000002 HYDROMORPHONE PER 4 MG: Performed by: PODIATRIST

## 2024-08-05 PROCEDURE — 63710000001 INSULIN LISPRO (HUMAN) PER 5 UNITS

## 2024-08-05 PROCEDURE — 83605 ASSAY OF LACTIC ACID: CPT | Performed by: PHYSICIAN ASSISTANT

## 2024-08-05 PROCEDURE — 82948 REAGENT STRIP/BLOOD GLUCOSE: CPT

## 2024-08-05 PROCEDURE — 87040 BLOOD CULTURE FOR BACTERIA: CPT

## 2024-08-05 PROCEDURE — G0378 HOSPITAL OBSERVATION PER HR: HCPCS

## 2024-08-05 PROCEDURE — 85652 RBC SED RATE AUTOMATED: CPT | Performed by: PHYSICIAN ASSISTANT

## 2024-08-05 PROCEDURE — 36415 COLL VENOUS BLD VENIPUNCTURE: CPT

## 2024-08-05 PROCEDURE — 87641 MR-STAPH DNA AMP PROBE: CPT

## 2024-08-05 PROCEDURE — 80053 COMPREHEN METABOLIC PANEL: CPT | Performed by: PHYSICIAN ASSISTANT

## 2024-08-05 PROCEDURE — 73630 X-RAY EXAM OF FOOT: CPT

## 2024-08-05 PROCEDURE — 99285 EMERGENCY DEPT VISIT HI MDM: CPT

## 2024-08-05 PROCEDURE — 25010000002 HYDROMORPHONE 1 MG/ML SOLUTION

## 2024-08-05 PROCEDURE — 86140 C-REACTIVE PROTEIN: CPT | Performed by: PHYSICIAN ASSISTANT

## 2024-08-05 PROCEDURE — 25010000002 MORPHINE PER 10 MG: Performed by: EMERGENCY MEDICINE

## 2024-08-05 PROCEDURE — 85025 COMPLETE CBC W/AUTO DIFF WBC: CPT | Performed by: PHYSICIAN ASSISTANT

## 2024-08-05 RX ORDER — SODIUM CHLORIDE 9 MG/ML
1000 INJECTION, SOLUTION INTRAVENOUS ONCE
Status: COMPLETED | OUTPATIENT
Start: 2024-08-05 | End: 2024-08-05

## 2024-08-05 RX ORDER — IBUPROFEN 600 MG/1
1 TABLET ORAL
Status: DISCONTINUED | OUTPATIENT
Start: 2024-08-05 | End: 2024-08-08 | Stop reason: SDUPTHER

## 2024-08-05 RX ORDER — AMOXICILLIN 250 MG
2 CAPSULE ORAL 2 TIMES DAILY PRN
Status: DISCONTINUED | OUTPATIENT
Start: 2024-08-05 | End: 2024-08-13 | Stop reason: HOSPADM

## 2024-08-05 RX ORDER — BISACODYL 5 MG/1
5 TABLET, DELAYED RELEASE ORAL DAILY PRN
Status: DISCONTINUED | OUTPATIENT
Start: 2024-08-05 | End: 2024-08-13 | Stop reason: HOSPADM

## 2024-08-05 RX ORDER — DEXTROSE MONOHYDRATE 25 G/50ML
25 INJECTION, SOLUTION INTRAVENOUS
Status: DISCONTINUED | OUTPATIENT
Start: 2024-08-05 | End: 2024-08-08 | Stop reason: SDUPTHER

## 2024-08-05 RX ORDER — SODIUM CHLORIDE 0.9 % (FLUSH) 0.9 %
10 SYRINGE (ML) INJECTION EVERY 12 HOURS SCHEDULED
Status: DISCONTINUED | OUTPATIENT
Start: 2024-08-05 | End: 2024-08-08

## 2024-08-05 RX ORDER — SODIUM CHLORIDE 0.9 % (FLUSH) 0.9 %
10 SYRINGE (ML) INJECTION AS NEEDED
Status: DISCONTINUED | OUTPATIENT
Start: 2024-08-05 | End: 2024-08-13 | Stop reason: HOSPADM

## 2024-08-05 RX ORDER — ACETAMINOPHEN 500 MG
1000 TABLET ORAL EVERY 8 HOURS PRN
Status: DISCONTINUED | OUTPATIENT
Start: 2024-08-05 | End: 2024-08-13 | Stop reason: HOSPADM

## 2024-08-05 RX ORDER — IBUPROFEN 600 MG/1
600 TABLET ORAL EVERY 6 HOURS PRN
Status: DISCONTINUED | OUTPATIENT
Start: 2024-08-05 | End: 2024-08-13 | Stop reason: HOSPADM

## 2024-08-05 RX ORDER — LIDOCAINE HYDROCHLORIDE 10 MG/ML
5 INJECTION, SOLUTION EPIDURAL; INFILTRATION; INTRACAUDAL; PERINEURAL ONCE
Status: COMPLETED | OUTPATIENT
Start: 2024-08-06 | End: 2024-08-06

## 2024-08-05 RX ORDER — SODIUM CHLORIDE 9 MG/ML
40 INJECTION, SOLUTION INTRAVENOUS AS NEEDED
Status: DISCONTINUED | OUTPATIENT
Start: 2024-08-05 | End: 2024-08-13 | Stop reason: HOSPADM

## 2024-08-05 RX ORDER — INSULIN LISPRO 100 [IU]/ML
10 INJECTION, SOLUTION INTRAVENOUS; SUBCUTANEOUS ONCE
Status: COMPLETED | OUTPATIENT
Start: 2024-08-05 | End: 2024-08-05

## 2024-08-05 RX ORDER — HYDROMORPHONE HYDROCHLORIDE 1 MG/ML
0.5 INJECTION, SOLUTION INTRAMUSCULAR; INTRAVENOUS; SUBCUTANEOUS
Status: DISCONTINUED | OUTPATIENT
Start: 2024-08-05 | End: 2024-08-07

## 2024-08-05 RX ORDER — BISACODYL 10 MG
10 SUPPOSITORY, RECTAL RECTAL DAILY PRN
Status: DISCONTINUED | OUTPATIENT
Start: 2024-08-05 | End: 2024-08-13 | Stop reason: HOSPADM

## 2024-08-05 RX ORDER — POLYETHYLENE GLYCOL 3350 17 G/17G
17 POWDER, FOR SOLUTION ORAL DAILY PRN
Status: DISCONTINUED | OUTPATIENT
Start: 2024-08-05 | End: 2024-08-13 | Stop reason: HOSPADM

## 2024-08-05 RX ORDER — VANCOMYCIN/0.9 % SOD CHLORIDE 1.5G/250ML
20 PLASTIC BAG, INJECTION (ML) INTRAVENOUS ONCE
Status: COMPLETED | OUTPATIENT
Start: 2024-08-05 | End: 2024-08-05

## 2024-08-05 RX ORDER — INSULIN LISPRO 100 [IU]/ML
2-9 INJECTION, SOLUTION INTRAVENOUS; SUBCUTANEOUS
Status: DISCONTINUED | OUTPATIENT
Start: 2024-08-05 | End: 2024-08-07

## 2024-08-05 RX ORDER — MORPHINE SULFATE 2 MG/ML
4 INJECTION, SOLUTION INTRAMUSCULAR; INTRAVENOUS ONCE
Status: COMPLETED | OUTPATIENT
Start: 2024-08-05 | End: 2024-08-05

## 2024-08-05 RX ORDER — NICOTINE POLACRILEX 4 MG
15 LOZENGE BUCCAL
Status: DISCONTINUED | OUTPATIENT
Start: 2024-08-05 | End: 2024-08-08 | Stop reason: SDUPTHER

## 2024-08-05 RX ORDER — NITROGLYCERIN 0.4 MG/1
0.4 TABLET SUBLINGUAL
Status: DISCONTINUED | OUTPATIENT
Start: 2024-08-05 | End: 2024-08-07

## 2024-08-05 RX ADMIN — VANCOMYCIN HYDROCHLORIDE 1500 MG: 10 INJECTION, POWDER, LYOPHILIZED, FOR SOLUTION INTRAVENOUS at 22:12

## 2024-08-05 RX ADMIN — INSULIN LISPRO 4 UNITS: 100 INJECTION, SOLUTION INTRAVENOUS; SUBCUTANEOUS at 23:48

## 2024-08-05 RX ADMIN — HYDROMORPHONE HYDROCHLORIDE 0.5 MG: 1 INJECTION, SOLUTION INTRAMUSCULAR; INTRAVENOUS; SUBCUTANEOUS at 23:48

## 2024-08-05 RX ADMIN — MORPHINE SULFATE 4 MG: 2 INJECTION, SOLUTION INTRAMUSCULAR; INTRAVENOUS at 19:43

## 2024-08-05 RX ADMIN — HYDROMORPHONE HYDROCHLORIDE 1 MG: 1 INJECTION, SOLUTION INTRAMUSCULAR; INTRAVENOUS; SUBCUTANEOUS at 21:28

## 2024-08-05 RX ADMIN — SODIUM CHLORIDE 1000 ML: 9 INJECTION, SOLUTION INTRAVENOUS at 22:11

## 2024-08-05 RX ADMIN — Medication 10 ML: at 21:28

## 2024-08-05 RX ADMIN — INSULIN LISPRO 10 UNITS: 100 INJECTION, SOLUTION INTRAVENOUS; SUBCUTANEOUS at 21:32

## 2024-08-05 RX ADMIN — PIPERACILLIN AND TAZOBACTAM 3.38 G: 3; .375 INJECTION, POWDER, FOR SOLUTION INTRAVENOUS at 19:55

## 2024-08-05 NOTE — ED PROVIDER NOTES
MD ATTESTATION NOTE    SHARED VISIT: This visit was performed by BOTH a physician and an APC. The substantive portion of the medical decision making was performed by this attesting physician who made or approved the management plan and takes responsibility for patient management. All studies in the APC note (if performed) were independently interpreted by me.     The JOSE R and I have discussed this patient's history, physical exam, and treatment plan.  I have reviewed the documentation and affirm the documentation and agree with the treatment and plan.  The attached note describes my personal findings.      Independent Historians: Patient    A complete HPI/ROS/PMH/PSH/SH/FH are unobtainable due to: None    Chronic or social conditions impacting patient care (social determinants of health): None    Shayy Ellison is a 43 y.o. male who presents to the ED c/o acute right foot pain and swelling that began a week and a half ago.  Patient did abrade his right foot with a misstep outside.  Patient had x-rays due to swelling and they were noted to be negative.  Patient has had increased swelling and redness to his midfoot both plantar and dorsal surface with pain.  Patient denies any fevers, vomiting or red streaking.  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          On exam:  GENERAL: Pleasant cooperative and conversant male, alert, no acute distress  SKIN: Warm, dry, right midfoot erythema and nonpitting edema with tenderness to palpation both plantar and dorsal surfaces, 2-second cap refill to toes on right foot  HENT: Normocephalic, atraumatic  NEURO: alert, moves all extremities, follows commands                                                             Labs  Recent Results (from the past 24 hour(s))   Comprehensive Metabolic Panel    Collection Time: 08/05/24  5:09 PM    Specimen: Blood   Result Value Ref Range    Glucose 297 (H) 65 - 99 mg/dL    BUN 14 6 - 20 mg/dL    Creatinine 0.77 0.76  - 1.27 mg/dL    Sodium 133 (L) 136 - 145 mmol/L    Potassium 4.1 3.5 - 5.2 mmol/L    Chloride 98 98 - 107 mmol/L    CO2 22.0 22.0 - 29.0 mmol/L    Calcium 9.2 8.6 - 10.5 mg/dL    Total Protein 7.3 6.0 - 8.5 g/dL    Albumin 3.6 3.5 - 5.2 g/dL    ALT (SGPT) 7 1 - 41 U/L    AST (SGOT) 6 1 - 40 U/L    Alkaline Phosphatase 86 39 - 117 U/L    Total Bilirubin 0.5 0.0 - 1.2 mg/dL    Globulin 3.7 gm/dL    A/G Ratio 1.0 g/dL    BUN/Creatinine Ratio 18.2 7.0 - 25.0    Anion Gap 13.0 5.0 - 15.0 mmol/L    eGFR 114.6 >60.0 mL/min/1.73   Sedimentation Rate    Collection Time: 08/05/24  5:09 PM    Specimen: Blood   Result Value Ref Range    Sed Rate 37 (H) 0 - 15 mm/hr   C-reactive Protein    Collection Time: 08/05/24  5:09 PM    Specimen: Blood   Result Value Ref Range    C-Reactive Protein 23.66 (H) 0.00 - 0.50 mg/dL   Lactic Acid, Plasma    Collection Time: 08/05/24  5:09 PM    Specimen: Blood   Result Value Ref Range    Lactate 1.4 0.5 - 2.0 mmol/L   CBC Auto Differential    Collection Time: 08/05/24  5:09 PM    Specimen: Blood   Result Value Ref Range    WBC 13.56 (H) 3.40 - 10.80 10*3/mm3    RBC 4.71 4.14 - 5.80 10*6/mm3    Hemoglobin 12.1 (L) 13.0 - 17.7 g/dL    Hematocrit 38.6 37.5 - 51.0 %    MCV 82.0 79.0 - 97.0 fL    MCH 25.7 (L) 26.6 - 33.0 pg    MCHC 31.3 (L) 31.5 - 35.7 g/dL    RDW 13.2 12.3 - 15.4 %    RDW-SD 39.6 37.0 - 54.0 fl    MPV 8.6 6.0 - 12.0 fL    Platelets 450 140 - 450 10*3/mm3    Neutrophil % 79.4 (H) 42.7 - 76.0 %    Lymphocyte % 12.1 (L) 19.6 - 45.3 %    Monocyte % 5.4 5.0 - 12.0 %    Eosinophil % 2.1 0.3 - 6.2 %    Basophil % 0.4 0.0 - 1.5 %    Immature Grans % 0.6 (H) 0.0 - 0.5 %    Neutrophils, Absolute 10.77 (H) 1.70 - 7.00 10*3/mm3    Lymphocytes, Absolute 1.64 0.70 - 3.10 10*3/mm3    Monocytes, Absolute 0.73 0.10 - 0.90 10*3/mm3    Eosinophils, Absolute 0.29 0.00 - 0.40 10*3/mm3    Basophils, Absolute 0.05 0.00 - 0.20 10*3/mm3    Immature Grans, Absolute 0.08 (H) 0.00 - 0.05 10*3/mm3    nRBC 0.0  0.0 - 0.2 /100 WBC   POC Glucose Once    Collection Time: 08/05/24  9:06 PM    Specimen: Blood   Result Value Ref Range    Glucose 405 (C) 70 - 130 mg/dL       Radiology  XR Foot 3+ View Right    Result Date: 8/5/2024  XR FOOT 3+ VW RIGHT-  INDICATIONS: Redness and swelling, pain.  TECHNIQUE: 3 VIEWS OF THE RIGHT FOOT  COMPARISON: None available  FINDINGS:  No acute fracture, erosion, or dislocation is identified. If there is further clinical concern, MRI or bone scan could be obtained for further evaluation. A sclerotic focus in the second proximal phalanx, although nonspecific, may represent bone island.       As described.    This report was finalized on 8/5/2024 6:22 PM by Dr. Donnell Alfredo M.D on Workstation: [email or web address]       Medical Decision Making:  ED Course as of 08/05/24 2300   Mon Aug 05, 2024   1719 WBC(!): 13.56 [DC]   1719 Neutrophils Absolute(!): 10.77 [DC]   1739 Glucose(!): 297 [DC]   1739 Creatinine: 0.77 [DC]   1739 C-Reactive Protein(!): 23.66 [DC]   1740 Sed Rate(!): 37 [DC]   1740 Lactate: 1.4 [DC]   1740 XR Foot 3+ View Right  Radiology study independently interpreted by me and my findings are no acute fracture.   [DC]   1933 Pt refusing blood cultures. [DC]   1936 Discussed case with GEETA Yun, who will admit the patient to observation unit. [DC]      ED Course User Index  [DC] January Francisco PA       MDM: This patient presents with lower extremity erythema, warmth, and swelling concerning for cellulitis. Patient does have sensitivity/pain to light touch around the erythematous area. No streaking nor exam evidence of fluctuance concerning for abscess noted. Low concern for osteomyelitis or DVT. No immune compromise, bullae, pain out of proportion, or rapid progression concerning for necrotizing fasciitis. Calf is soft and no concern for compartment syndrome.    Procedures:  Procedures        PPE: I followed hospital protocols for proper PPE based on patient presentation  including use of N95 mask for suspected infectious respiratory conditions.  Proper hand hygiene was performed both before and after the patient encounter.          Diagnosis  Final diagnoses:   Cellulitis of right foot   Uncontrolled type 2 diabetes mellitus with hyperglycemia       Note Disclaimer: At [facility], we believe that sharing information builds trust and better relationships. You are receiving this note because you recently visited [facility]. It is possible you will see health information before a provider has talked with you about it. This kind of information can be easy to misunderstand. To help you fully understand what it means for your health, we urge you to discuss this note with your provider.       Marcella Simeon MD  08/05/24 1264

## 2024-08-05 NOTE — ED PROVIDER NOTES
EMERGENCY DEPARTMENT ENCOUNTER      PCP: Provider, No Known  Patient Care Team:  Provider, No Known as PCP - General   Independent Historians: Patient    HPI:  Chief Complaint: Foot pain  A complete HPI/ROS/PMH/PSH/SH/FH are unobtainable due to: None    Chronic or social conditions impacting patient care (social determinants of health): None    Context: Shayy Ellison is a 43 y.o. male with history of diabetes, asthma who presents to the ED c/o acute right foot pain and swelling that began about a week and a half ago.  Patient states he was stepping out of bed and stepped on a house shoe with a plastic edge causing a superficial abrasion on his right foot.  Patient had been seen at an outside facility and had negative x-ray but states his swelling and pain have worsened.  He has had increased redness to the right lateral foot and to the bottom of his foot.  There is no open wound or drainage.  He denies any fever or chills.  He does not check his blood sugar at home.  He reports taking metformin daily.  He has been ambulating on the foot throughout the week but at this point cannot put much weight on the foot without significant pain.    Review of prior external notes and/or external test results outside of this encounter: X-ray of the foot on 7/27/2024 showed mild soft tissue swelling along the lateral aspect of the right midfoot with no acute fracture or traumatic malalignment.  Mild degenerative disease of the right tarsometatarsal joint.      PAST MEDICAL HISTORY  Active Ambulatory Problems     Diagnosis Date Noted    No Active Ambulatory Problems     Resolved Ambulatory Problems     Diagnosis Date Noted    No Resolved Ambulatory Problems     Past Medical History:   Diagnosis Date    Asthma     Diabetes        The patient qualifies to receive the vaccine, but they have not yet received it.    PAST SURGICAL HISTORY  Past Surgical History:   Procedure Laterality Date    ANKLE SURGERY Left 2005         FAMILY  HISTORY  Family History   Problem Relation Age of Onset    Hypertension Mother     Diabetes Mother     Hypertension Father     Diabetes Father     Lung cancer Father     No Known Problems Brother          SOCIAL HISTORY  Social History     Socioeconomic History    Marital status: Single   Tobacco Use    Smoking status: Every Day     Current packs/day: 1.50     Average packs/day: 1.5 packs/day for 15.0 years (22.5 ttl pk-yrs)     Types: Cigarettes     Passive exposure: Never    Smokeless tobacco: Never   Vaping Use    Vaping status: Never Used   Substance and Sexual Activity    Alcohol use: Not Currently    Drug use: Never         ALLERGIES  Latex        REVIEW OF SYSTEMS  Review of Systems   Constitutional:  Negative for chills and fever.   Cardiovascular:  Negative for leg swelling.   Musculoskeletal:         Right foot pain   Skin:  Positive for color change (Erythema right foot).        All systems reviewed and negative except for those discussed in HPI.       PHYSICAL EXAM    I have reviewed the triage vital signs and nursing notes.    ED Triage Vitals   Temp Heart Rate Resp BP SpO2   08/05/24 1555 08/05/24 1554 08/05/24 1554 08/05/24 1555 08/05/24 1554   98.4 °F (36.9 °C) (!) 129 18 147/86 98 %      Temp src Heart Rate Source Patient Position BP Location FiO2 (%)   08/05/24 1555 08/05/24 1554 -- -- --   Tympanic Monitor          Physical Exam  GENERAL: alert, no acute distress  SKIN: Warm, dry  HENT: Normocephalic, atraumatic  EYES: no scleral icterus  CV: regular rhythm, regular rate  RESPIRATORY: normal effort, lungs clear  ABDOMEN: soft, nontender, nondistended  MUSCULOSKELETAL: There is swelling to the right foot worse on the lateral aspect with overlying erythema to the lateral right foot and to the plantar aspect of the foot, there is significant tenderness on palpation to the lateral foot, there is no ankle tenderness or calf tenderness, there is no obvious open wound  NEURO: alert, moves all  extremities, follows commands          LAB RESULTS  Recent Results (from the past 24 hour(s))   Comprehensive Metabolic Panel    Collection Time: 08/05/24  5:09 PM    Specimen: Blood   Result Value Ref Range    Glucose 297 (H) 65 - 99 mg/dL    BUN 14 6 - 20 mg/dL    Creatinine 0.77 0.76 - 1.27 mg/dL    Sodium 133 (L) 136 - 145 mmol/L    Potassium 4.1 3.5 - 5.2 mmol/L    Chloride 98 98 - 107 mmol/L    CO2 22.0 22.0 - 29.0 mmol/L    Calcium 9.2 8.6 - 10.5 mg/dL    Total Protein 7.3 6.0 - 8.5 g/dL    Albumin 3.6 3.5 - 5.2 g/dL    ALT (SGPT) 7 1 - 41 U/L    AST (SGOT) 6 1 - 40 U/L    Alkaline Phosphatase 86 39 - 117 U/L    Total Bilirubin 0.5 0.0 - 1.2 mg/dL    Globulin 3.7 gm/dL    A/G Ratio 1.0 g/dL    BUN/Creatinine Ratio 18.2 7.0 - 25.0    Anion Gap 13.0 5.0 - 15.0 mmol/L    eGFR 114.6 >60.0 mL/min/1.73   Sedimentation Rate    Collection Time: 08/05/24  5:09 PM    Specimen: Blood   Result Value Ref Range    Sed Rate 37 (H) 0 - 15 mm/hr   C-reactive Protein    Collection Time: 08/05/24  5:09 PM    Specimen: Blood   Result Value Ref Range    C-Reactive Protein 23.66 (H) 0.00 - 0.50 mg/dL   Lactic Acid, Plasma    Collection Time: 08/05/24  5:09 PM    Specimen: Blood   Result Value Ref Range    Lactate 1.4 0.5 - 2.0 mmol/L   CBC Auto Differential    Collection Time: 08/05/24  5:09 PM    Specimen: Blood   Result Value Ref Range    WBC 13.56 (H) 3.40 - 10.80 10*3/mm3    RBC 4.71 4.14 - 5.80 10*6/mm3    Hemoglobin 12.1 (L) 13.0 - 17.7 g/dL    Hematocrit 38.6 37.5 - 51.0 %    MCV 82.0 79.0 - 97.0 fL    MCH 25.7 (L) 26.6 - 33.0 pg    MCHC 31.3 (L) 31.5 - 35.7 g/dL    RDW 13.2 12.3 - 15.4 %    RDW-SD 39.6 37.0 - 54.0 fl    MPV 8.6 6.0 - 12.0 fL    Platelets 450 140 - 450 10*3/mm3    Neutrophil % 79.4 (H) 42.7 - 76.0 %    Lymphocyte % 12.1 (L) 19.6 - 45.3 %    Monocyte % 5.4 5.0 - 12.0 %    Eosinophil % 2.1 0.3 - 6.2 %    Basophil % 0.4 0.0 - 1.5 %    Immature Grans % 0.6 (H) 0.0 - 0.5 %    Neutrophils, Absolute 10.77 (H) 1.70  - 7.00 10*3/mm3    Lymphocytes, Absolute 1.64 0.70 - 3.10 10*3/mm3    Monocytes, Absolute 0.73 0.10 - 0.90 10*3/mm3    Eosinophils, Absolute 0.29 0.00 - 0.40 10*3/mm3    Basophils, Absolute 0.05 0.00 - 0.20 10*3/mm3    Immature Grans, Absolute 0.08 (H) 0.00 - 0.05 10*3/mm3    nRBC 0.0 0.0 - 0.2 /100 WBC       Ordered the above labs and independently reviewed and interpreted the results.        RADIOLOGY  XR Foot 3+ View Right    Result Date: 8/5/2024  XR FOOT 3+ VW RIGHT-  INDICATIONS: Redness and swelling, pain.  TECHNIQUE: 3 VIEWS OF THE RIGHT FOOT  COMPARISON: None available  FINDINGS:  No acute fracture, erosion, or dislocation is identified. If there is further clinical concern, MRI or bone scan could be obtained for further evaluation. A sclerotic focus in the second proximal phalanx, although nonspecific, may represent bone island.       As described.    This report was finalized on 8/5/2024 6:22 PM by Dr. Donnell Alfredo M.D on Workstation: [email or web address]       I ordered the above noted radiological studies. Independently reviewed and interpreted by me.  See dictation for official radiology interpretation.      PROCEDURES    Procedures      MEDICATIONS GIVEN IN ER    Medications   sodium chloride 0.9 % flush 10 mL (has no administration in time range)   vancomycin IVPB 1500 mg in 0.9% NaCl (Premix) 500 mL (has no administration in time range)   sodium chloride 0.9 % flush 10 mL (has no administration in time range)   sodium chloride 0.9 % flush 10 mL (has no administration in time range)   sodium chloride 0.9 % infusion 40 mL (has no administration in time range)   nitroglycerin (NITROSTAT) SL tablet 0.4 mg (has no administration in time range)   Potassium Replacement - Follow Nurse / BPA Driven Protocol (has no administration in time range)   Magnesium Standard Dose Replacement - Follow Nurse / BPA Driven Protocol (has no administration in time range)   Phosphorus Replacement - Follow Nurse / BPA Driven  Protocol (has no administration in time range)   Calcium Replacement - Follow Nurse / BPA Driven Protocol (has no administration in time range)   sennosides-docusate (PERICOLACE) 8.6-50 MG per tablet 2 tablet (has no administration in time range)     And   polyethylene glycol (MIRALAX) packet 17 g (has no administration in time range)     And   bisacodyl (DULCOLAX) EC tablet 5 mg (has no administration in time range)     And   bisacodyl (DULCOLAX) suppository 10 mg (has no administration in time range)   dextrose (GLUTOSE) oral gel 15 g (has no administration in time range)   dextrose (D50W) (25 g/50 mL) IV injection 25 g (has no administration in time range)   glucagon (GLUCAGEN) injection 1 mg (has no administration in time range)   piperacillin-tazobactam (ZOSYN) 3.375 g IVPB in 100 mL NS MBP (CD) (3.375 g Intravenous New Bag 8/5/24 1955)   morphine injection 4 mg (4 mg Intravenous Given 8/5/24 1943)         PROGRESS, DATA ANALYSIS, CONSULTS, AND MEDICAL DECISION MAKING    All labs have been independently reviewed and interpreted by me.  All radiology studies have been independently reviewed and interpreted by me and discussed with radiologist dictating the report.   EKG's independently reviewed and interpreted by me.  Discussion below represents my analysis of pertinent findings related to patient's condition, differential diagnosis, treatment plan and final disposition.    Differential diagnosis: Cellulitis, retained foreign body, fracture, osteomyelitis    ED Course as of 08/05/24 1957   Mon Aug 05, 2024   1719 WBC(!): 13.56 [DC]   1719 Neutrophils Absolute(!): 10.77 [DC]   1739 Glucose(!): 297 [DC]   1739 Creatinine: 0.77 [DC]   1739 C-Reactive Protein(!): 23.66 [DC]   1740 Sed Rate(!): 37 [DC]   1740 Lactate: 1.4 [DC]   1740 XR Foot 3+ View Right  Radiology study independently interpreted by me and my findings are no acute fracture.   [DC]   1933 Pt refusing blood cultures. [DC]   1936 Discussed case with  Ruben Mcmillan, [employer] JOSE R, who will admit the patient to observation unit. [DC]      ED Course User Index  [DC] January Francisco PA             AS OF 19:57 EDT VITALS:    BP - 121/90  HR - 87  TEMP - 98.4 °F (36.9 °C) (Tympanic)  O2 SATS - 98%        DIAGNOSIS  Final diagnoses:   Cellulitis of right foot   Uncontrolled type 2 diabetes mellitus with hyperglycemia         DISPOSITION  ED Disposition       ED Disposition   Decision to Admit    Condition   --    Comment   --                  Note Disclaimer: At [facility], we believe that sharing information builds trust and better relationships. You are receiving this note because you recently visited [facility]. It is possible you will see health information before a provider has talked with you about it. This kind of information can be easy to misunderstand. To help you fully understand what it means for your health, we urge you to discuss this note with your provider.         January Francisco PA  08/05/24 1957

## 2024-08-05 NOTE — ED TRIAGE NOTES
Right foot pain patient states he twisted it about a week and a half ago. Patient states was seen on [facility] but has had no improvements.

## 2024-08-05 NOTE — ED NOTES
"Attempted to obtain blood cultures, pt said \"no, I don't want to be poked\". I did not collect cultures. Provider notified.   "

## 2024-08-06 ENCOUNTER — APPOINTMENT (OUTPATIENT)
Dept: MRI IMAGING | Facility: HOSPITAL | Age: 44
DRG: 854 | End: 2024-08-06

## 2024-08-06 PROBLEM — E11.628 DIABETIC FOOT INFECTION: Status: ACTIVE | Noted: 2024-08-06

## 2024-08-06 PROBLEM — A41.9 SEPSIS: Status: ACTIVE | Noted: 2024-08-06

## 2024-08-06 PROBLEM — L03.115 CELLULITIS OF RIGHT FOOT: Status: ACTIVE | Noted: 2024-08-05

## 2024-08-06 PROBLEM — L02.611 ABSCESS OF RIGHT FOOT: Status: ACTIVE | Noted: 2024-08-05

## 2024-08-06 PROBLEM — L08.9 DIABETIC FOOT INFECTION: Status: ACTIVE | Noted: 2024-08-06

## 2024-08-06 LAB
ANION GAP SERPL CALCULATED.3IONS-SCNC: 9 MMOL/L (ref 5–15)
BASOPHILS # BLD AUTO: 0.05 10*3/MM3 (ref 0–0.2)
BASOPHILS NFR BLD AUTO: 0.3 % (ref 0–1.5)
BUN SERPL-MCNC: 10 MG/DL (ref 6–20)
BUN/CREAT SERPL: 15.2 (ref 7–25)
CALCIUM SPEC-SCNC: 8.7 MG/DL (ref 8.6–10.5)
CHLORIDE SERPL-SCNC: 98 MMOL/L (ref 98–107)
CO2 SERPL-SCNC: 24 MMOL/L (ref 22–29)
CREAT SERPL-MCNC: 0.66 MG/DL (ref 0.76–1.27)
DEPRECATED RDW RBC AUTO: 38.3 FL (ref 37–54)
EGFRCR SERPLBLD CKD-EPI 2021: 120.1 ML/MIN/1.73
EOSINOPHIL # BLD AUTO: 0.23 10*3/MM3 (ref 0–0.4)
EOSINOPHIL NFR BLD AUTO: 1.5 % (ref 0.3–6.2)
ERYTHROCYTE [DISTWIDTH] IN BLOOD BY AUTOMATED COUNT: 13.2 % (ref 12.3–15.4)
GLUCOSE BLDC GLUCOMTR-MCNC: 125 MG/DL (ref 70–130)
GLUCOSE BLDC GLUCOMTR-MCNC: 214 MG/DL (ref 70–130)
GLUCOSE BLDC GLUCOMTR-MCNC: 235 MG/DL (ref 70–130)
GLUCOSE BLDC GLUCOMTR-MCNC: 82 MG/DL (ref 70–130)
GLUCOSE SERPL-MCNC: 258 MG/DL (ref 65–99)
HBA1C MFR BLD: 12.5 % (ref 4.8–5.6)
HCT VFR BLD AUTO: 35.4 % (ref 37.5–51)
HGB BLD-MCNC: 11.4 G/DL (ref 13–17.7)
IMM GRANULOCYTES # BLD AUTO: 0.11 10*3/MM3 (ref 0–0.05)
IMM GRANULOCYTES NFR BLD AUTO: 0.7 % (ref 0–0.5)
LYMPHOCYTES # BLD AUTO: 1.98 10*3/MM3 (ref 0.7–3.1)
LYMPHOCYTES NFR BLD AUTO: 12.8 % (ref 19.6–45.3)
MCH RBC QN AUTO: 26 PG (ref 26.6–33)
MCHC RBC AUTO-ENTMCNC: 32.2 G/DL (ref 31.5–35.7)
MCV RBC AUTO: 80.6 FL (ref 79–97)
MONOCYTES # BLD AUTO: 0.99 10*3/MM3 (ref 0.1–0.9)
MONOCYTES NFR BLD AUTO: 6.4 % (ref 5–12)
MRSA DNA SPEC QL NAA+PROBE: ABNORMAL
NEUTROPHILS NFR BLD AUTO: 12.12 10*3/MM3 (ref 1.7–7)
NEUTROPHILS NFR BLD AUTO: 78.3 % (ref 42.7–76)
NRBC BLD AUTO-RTO: 0 /100 WBC (ref 0–0.2)
PLATELET # BLD AUTO: 413 10*3/MM3 (ref 140–450)
PMV BLD AUTO: 8.5 FL (ref 6–12)
POTASSIUM SERPL-SCNC: 3.9 MMOL/L (ref 3.5–5.2)
QT INTERVAL: 375 MS
QTC INTERVAL: 444 MS
RBC # BLD AUTO: 4.39 10*6/MM3 (ref 4.14–5.8)
SODIUM SERPL-SCNC: 131 MMOL/L (ref 136–145)
WBC NRBC COR # BLD AUTO: 15.48 10*3/MM3 (ref 3.4–10.8)

## 2024-08-06 PROCEDURE — 25010000002 HYDROMORPHONE PER 4 MG: Performed by: PODIATRIST

## 2024-08-06 PROCEDURE — 93010 ELECTROCARDIOGRAM REPORT: CPT | Performed by: INTERNAL MEDICINE

## 2024-08-06 PROCEDURE — 99255 IP/OBS CONSLTJ NEW/EST HI 80: CPT | Performed by: INTERNAL MEDICINE

## 2024-08-06 PROCEDURE — 25010000002 VANCOMYCIN 10 G RECONSTITUTED SOLUTION: Performed by: PODIATRIST

## 2024-08-06 PROCEDURE — 82948 REAGENT STRIP/BLOOD GLUCOSE: CPT

## 2024-08-06 PROCEDURE — A9577 INJ MULTIHANCE: HCPCS | Performed by: EMERGENCY MEDICINE

## 2024-08-06 PROCEDURE — 25010000002 PIPERACILLIN SOD-TAZOBACTAM PER 1 G

## 2024-08-06 PROCEDURE — 63710000001 INSULIN LISPRO (HUMAN) PER 5 UNITS: Performed by: STUDENT IN AN ORGANIZED HEALTH CARE EDUCATION/TRAINING PROGRAM

## 2024-08-06 PROCEDURE — 63710000001 INSULIN LISPRO (HUMAN) PER 5 UNITS: Performed by: PODIATRIST

## 2024-08-06 PROCEDURE — 0 GADOBENATE DIMEGLUMINE 529 MG/ML SOLUTION: Performed by: EMERGENCY MEDICINE

## 2024-08-06 PROCEDURE — 73720 MRI LWR EXTREMITY W/O&W/DYE: CPT

## 2024-08-06 PROCEDURE — 25010000002 CEFEPIME PER 500 MG: Performed by: INTERNAL MEDICINE

## 2024-08-06 PROCEDURE — 0J973ZZ DRAINAGE OF BACK SUBCUTANEOUS TISSUE AND FASCIA, PERCUTANEOUS APPROACH: ICD-10-PCS

## 2024-08-06 PROCEDURE — 80048 BASIC METABOLIC PNL TOTAL CA: CPT

## 2024-08-06 PROCEDURE — 25810000003 SODIUM CHLORIDE 0.9 % SOLUTION: Performed by: PODIATRIST

## 2024-08-06 PROCEDURE — 25810000003 SODIUM CHLORIDE 0.9 % SOLUTION

## 2024-08-06 PROCEDURE — 83036 HEMOGLOBIN GLYCOSYLATED A1C: CPT | Performed by: PODIATRIST

## 2024-08-06 PROCEDURE — 63710000001 INSULIN LISPRO (HUMAN) PER 5 UNITS

## 2024-08-06 PROCEDURE — 25010000002 PIPERACILLIN SOD-TAZOBACTAM PER 1 G: Performed by: INTERNAL MEDICINE

## 2024-08-06 PROCEDURE — 85025 COMPLETE CBC W/AUTO DIFF WBC: CPT

## 2024-08-06 PROCEDURE — 93005 ELECTROCARDIOGRAM TRACING: CPT

## 2024-08-06 PROCEDURE — 25010000002 VANCOMYCIN 10 G RECONSTITUTED SOLUTION

## 2024-08-06 PROCEDURE — 25010000002 HYDROMORPHONE PER 4 MG

## 2024-08-06 RX ORDER — INSULIN ASPART 100 [IU]/ML
INJECTION, SOLUTION INTRAVENOUS; SUBCUTANEOUS
Qty: 15 ML | Refills: 1 | Status: CANCELLED | OUTPATIENT
Start: 2024-08-06

## 2024-08-06 RX ORDER — VANCOMYCIN/0.9 % SOD CHLORIDE 1.5G/250ML
1500 PLASTIC BAG, INJECTION (ML) INTRAVENOUS EVERY 12 HOURS
Status: DISCONTINUED | OUTPATIENT
Start: 2024-08-06 | End: 2024-08-07

## 2024-08-06 RX ORDER — INSULIN LISPRO 100 [IU]/ML
5 INJECTION, SOLUTION INTRAVENOUS; SUBCUTANEOUS
Status: DISCONTINUED | OUTPATIENT
Start: 2024-08-06 | End: 2024-08-07

## 2024-08-06 RX ORDER — NICOTINE 21 MG/24HR
1 PATCH, TRANSDERMAL 24 HOURS TRANSDERMAL
Status: DISCONTINUED | OUTPATIENT
Start: 2024-08-06 | End: 2024-08-13 | Stop reason: HOSPADM

## 2024-08-06 RX ADMIN — PIPERACILLIN AND TAZOBACTAM 3.38 G: 3; .375 INJECTION, POWDER, FOR SOLUTION INTRAVENOUS at 12:58

## 2024-08-06 RX ADMIN — CEFEPIME 2000 MG: 2 INJECTION, POWDER, FOR SOLUTION INTRAVENOUS at 21:08

## 2024-08-06 RX ADMIN — ACETAMINOPHEN 1000 MG: 500 TABLET ORAL at 05:07

## 2024-08-06 RX ADMIN — HYDROMORPHONE HYDROCHLORIDE 0.5 MG: 1 INJECTION, SOLUTION INTRAMUSCULAR; INTRAVENOUS; SUBCUTANEOUS at 15:37

## 2024-08-06 RX ADMIN — HYDROMORPHONE HYDROCHLORIDE 0.5 MG: 1 INJECTION, SOLUTION INTRAMUSCULAR; INTRAVENOUS; SUBCUTANEOUS at 08:31

## 2024-08-06 RX ADMIN — SERTRALINE 50 MG: 50 TABLET, FILM COATED ORAL at 09:49

## 2024-08-06 RX ADMIN — INSULIN LISPRO 4 UNITS: 100 INJECTION, SOLUTION INTRAVENOUS; SUBCUTANEOUS at 12:23

## 2024-08-06 RX ADMIN — HYDROMORPHONE HYDROCHLORIDE 0.5 MG: 1 INJECTION, SOLUTION INTRAMUSCULAR; INTRAVENOUS; SUBCUTANEOUS at 11:03

## 2024-08-06 RX ADMIN — NICOTINE 1 PATCH: 21 PATCH, EXTENDED RELEASE TRANSDERMAL at 17:37

## 2024-08-06 RX ADMIN — HYDROMORPHONE HYDROCHLORIDE 0.5 MG: 1 INJECTION, SOLUTION INTRAMUSCULAR; INTRAVENOUS; SUBCUTANEOUS at 13:06

## 2024-08-06 RX ADMIN — INSULIN LISPRO 5 UNITS: 100 INJECTION, SOLUTION INTRAVENOUS; SUBCUTANEOUS at 17:37

## 2024-08-06 RX ADMIN — EMPAGLIFLOZIN 10 MG: 10 TABLET, FILM COATED ORAL at 09:49

## 2024-08-06 RX ADMIN — ACETAMINOPHEN 1000 MG: 500 TABLET ORAL at 16:46

## 2024-08-06 RX ADMIN — PIPERACILLIN AND TAZOBACTAM 3.38 G: 3; .375 INJECTION, POWDER, FOR SOLUTION INTRAVENOUS at 05:02

## 2024-08-06 RX ADMIN — INSULIN LISPRO 4 UNITS: 100 INJECTION, SOLUTION INTRAVENOUS; SUBCUTANEOUS at 09:49

## 2024-08-06 RX ADMIN — Medication 10 ML: at 21:12

## 2024-08-06 RX ADMIN — GADOBENATE DIMEGLUMINE 16 ML: 529 INJECTION, SOLUTION INTRAVENOUS at 08:55

## 2024-08-06 RX ADMIN — VANCOMYCIN HYDROCHLORIDE 1500 MG: 10 INJECTION, POWDER, LYOPHILIZED, FOR SOLUTION INTRAVENOUS at 11:03

## 2024-08-06 RX ADMIN — HYDROMORPHONE HYDROCHLORIDE 0.5 MG: 1 INJECTION, SOLUTION INTRAMUSCULAR; INTRAVENOUS; SUBCUTANEOUS at 03:36

## 2024-08-06 RX ADMIN — Medication 10 ML: at 10:02

## 2024-08-06 RX ADMIN — HYDROMORPHONE HYDROCHLORIDE 0.5 MG: 1 INJECTION, SOLUTION INTRAMUSCULAR; INTRAVENOUS; SUBCUTANEOUS at 21:01

## 2024-08-06 RX ADMIN — IBUPROFEN 600 MG: 200 TABLET, FILM COATED ORAL at 05:07

## 2024-08-06 RX ADMIN — HYDROMORPHONE HYDROCHLORIDE 0.5 MG: 1 INJECTION, SOLUTION INTRAMUSCULAR; INTRAVENOUS; SUBCUTANEOUS at 06:04

## 2024-08-06 RX ADMIN — LIDOCAINE HYDROCHLORIDE 5 ML: 10 INJECTION, SOLUTION EPIDURAL; INFILTRATION; INTRACAUDAL; PERINEURAL at 01:46

## 2024-08-06 RX ADMIN — VANCOMYCIN HYDROCHLORIDE 1500 MG: 10 INJECTION, POWDER, LYOPHILIZED, FOR SOLUTION INTRAVENOUS at 23:23

## 2024-08-06 RX ADMIN — HYDROMORPHONE HYDROCHLORIDE 0.5 MG: 1 INJECTION, SOLUTION INTRAMUSCULAR; INTRAVENOUS; SUBCUTANEOUS at 23:30

## 2024-08-06 RX ADMIN — IBUPROFEN 600 MG: 200 TABLET, FILM COATED ORAL at 12:23

## 2024-08-06 RX ADMIN — HYDROMORPHONE HYDROCHLORIDE 0.5 MG: 1 INJECTION, SOLUTION INTRAMUSCULAR; INTRAVENOUS; SUBCUTANEOUS at 17:37

## 2024-08-06 NOTE — PROGRESS NOTES
"GEETA DUBOSE Attestation Note    I supervised care provided by the midlevel provider.    The JOSE R and I have discussed this patient's history, physical exam, and treatment plan. I have reviewed the documentation and personally had a face to face interaction with the patient  I affirm the documentation and agree with the treatment and plan. I provided a substantive portion of the care of this patient.  I personally performed the physical exam, in its entirety.  My personal findings are documented in below:    History:  Patient with history of asthma and diabetes presents to ED for evaluation of worsening right foot pain and swelling.  Over the past week and a half he has had progressively increasing pain and redness escalating on the lateral aspect of the foot.  The pain and swelling began after he accidentally stepped onto a hard plastic edge of his house shoe.  Today the pain and swelling became unbearable to the point that he could not walk or bear weight comfortably.  With regard to his diabetes care, patient says that he never feels good when his blood sugars are less than 200.  He says he cannot even \"do his job\" if it is not low.  He says he only feels well when his blood sugars are in the 300-400 range.    Physical Exam:  General: Appears uncomfortable, right foot propped up on pillow in the bed.  HENT: NCAT, PERRL, Nares patent.  Eyes: no scleral icterus.  Neck: trachea midline, no ROM limitations.  CV: Pink warm and well-perfused throughout  Respiratory: No distress or increased work of breathing  Abdomen: soft, no focal tenderness or rigidity  Musculoskeletal: no deformity.  The right foot is moderately edematous and there is a moderate amount of erythema and induration to both the lateral aspect and to a lesser degree the medial aspect of the midfoot.  There is tenderness extending up to the level of the ankle.  The foot is warm and well-perfused.  Distal sensation is intact.  Neuro: alert, moves all " extremities, follows commands.  Skin: warm, dry.    Assessment and Plan:  Right foot cellulitis: IV antibiotics initiated in ED.  Continue Zosyn for pseudomonal coverage.  MRI foot to evaluate for any features of osteomyelitis.  Will plan for vascular surgery/podiatry consult as well.    Uncontrolled type 2 diabetes: Sliding scale insulin, Accu-Cheks

## 2024-08-06 NOTE — PLAN OF CARE
Goal Outcome Evaluation:               Patient is admitted to ED observation for evaluation and treatment of possible infection in R foot. Incidental finding of wound on right posterior shoulder with signs/of infection. Patient and wife state he has also recently had an abscess area on his right shoulder. Provider it notified. BG on admission is 405, supplemental insulin is give see MAR.  Recheck at 0000 has BG at 227, provider notified with administration of additional dose of insulin see MAR. Patient is Hypertensive with SBP in the 150's, HR in the low 100's, MRSA swab done with result being positive. Patient convinced to allow for one blood culture to be taken. Pain meds ordered and patient now resting comfortably.

## 2024-08-06 NOTE — CONSULTS
Podiatry Consult Note      Patient: Shayy Ellison Admit Date: 08/05/2024    Age: 43 y.o.   PCP: Provider, No Known    MRN: [id number]  Room: [facility]/        Subjective     Chief Complaint     Chief Complaint   Patient presents with    Foot Pain        HPI     This is a 43-year-old male with poorly controlled diabetes who presents with worsening redness swelling and pain to his right foot.  Patient states 2 weeks ago he got out of bed and loosely put on his right shoe.  As he was walking he felt pain and discomfort to his right foot.  Over the next few days, his right foot continue to worsen and states he had a small little area of redness.  Last week and he went to an urgent care and was given crutches and a cam boot.  His pain and swelling is continued to worsen and he came to [facility] emergency department for further evaluation.  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  He currently smokes cigarettes.  He has no other complaints at this time.    Past Medical History     Past Medical History:   Diagnosis Date    Asthma     Diabetes         Past Surgical History:   Procedure Laterality Date    ANKLE SURGERY Left 2005        Allergies   Allergen Reactions    Latex Rash        Social History     Tobacco Use   Smoking Status Every Day    Current packs/day: 1.50    Average packs/day: 1.5 packs/day for 15.0 years (22.5 ttl pk-yrs)    Types: Cigarettes    Passive exposure: Never   Smokeless Tobacco Never        Objective   Physical Exam    Vitals:    08/06/24 0716   BP: 111/78   Pulse: 84   Resp: 16   Temp: 98.2 °F (36.8 °C)   SpO2: 96%        Dermatology: No open lesions noted to the right foot.  There is significant erythema edema to the right lateral midfoot that does extend plantarly towards the medial aspect of the foot.  No proximal streaking past the ankle.    Vascular: DP and PT pulses are palpable    Neurological: Light touch and protective sensation are diminished to the right foot    Musckuloskeletal: 5 out  of 5 muscle strength all compartments, pain to palpation of the lateral forefoot.    Labs     Lab Results   Component Value Date    HGBA1C 11.50 (H) 12/07/2023    POCGLU 225 (H) 08/05/2024    SEDRATE 37 (H) 08/05/2024        CBC:      Lab 08/05/24  1709   WBC 13.56*   HEMOGLOBIN 12.1*   HEMATOCRIT 38.6   PLATELETS 450   NEUTROS ABS 10.77*   IMMATURE GRANS (ABS) 0.08*   LYMPHS ABS 1.64   MONOS ABS 0.73   EOS ABS 0.29   MCV 82.0          No results found for this or any previous visit.     XR Foot 3+ View Right  Narrative: XR FOOT 3+ VW RIGHT-     INDICATIONS: Redness and swelling, pain.     TECHNIQUE: 3 VIEWS OF THE RIGHT FOOT     COMPARISON: None available     FINDINGS:     No acute fracture, erosion, or dislocation is identified. If there is  further clinical concern, MRI or bone scan could be obtained for further  evaluation. A sclerotic focus in the second proximal phalanx, although  nonspecific, may represent bone island.     Impression:    As described.           This report was finalized on 8/5/2024 6:22 PM by Dr. Donnell Alfredo M.D on Workstation: [id number]          Assessment/Plan     43-year-old male with uncontrolled diabetes mellitus, cellulitis, possible abscess right foot    -Patient examined and evaluated by myself  - WBC, ESR, CRP are elevated.  I will also order hemoglobin A1c more recent analysis of diabetic control  - X-rays reviewed, agree with readings.  No signs of cortical erosion  - MRI has been ordered and patient is heading to the MRI department right now.  I have a high suspicion for an abscess to his right foot.  We will wait for MRI reading.  - I discussed the findings of this case with ZULY Mao.  If MRI does have concerns for abscess, patient will likely need admitted and require incision and drainage.  I also discussed this with the patient at bedside    Thank you for the consult, we will continue to follow      Shashi Davidson DPM  [facility]  Office:  [phone]

## 2024-08-06 NOTE — PLAN OF CARE
Goal Outcome Evaluation:              Outcome Evaluation: pt to be admitted to a inpatient bed, pt is aox4, vss, pt is in a contact precations, , iv antiobiotics continued,pt complains of severe of pain in right foot, swelling and redness is present, blood sugar mangment, pt to be npo at midnight to go for surgery in the am, Infectious disease and general surgery following, pt has no further questions at this time, pt is resting at this time

## 2024-08-06 NOTE — SEPSIS FOCUSED EXAM
SEPTIC SHOCK FOCUSED EXAM    *Must be completed by a licensed independent Practitioner within 6 hours of diagnosis for the following conditions -- Septic Shock or Severe Sepsis with Lactate >/= 4.    Fluid bolus must be completed prior to assessment.      Diagnosis:  SIRS     Vital Signs (Attestation) Reviewed Temp, HR, RR, BP, SpO2   Shock Index (HR/SBP) (Attestation) Reviewed    Urine Output  WNL   General acute distress   Respiratory Within Normal Limits and lungs clear    Within Normal Limits and regular rate, rhythm   Skin (documentation of skin color is required) pink mucosa   Capillary Refill <3 seconds   Peripheral Pulses Checked                          radial  palpable, dorsal-pedis  palpable, posteria-tib   palpable, and peripheral  palpable     † Septic shock is defined by CMS as severe sepsis plus one of the following: persistent hypotension after fluid bolus OR tissue hypoperfusion (Lactic Acid ?4)  †† ONE OF THE FOLLOWING can be done in lieu of the Focused Exam: Measure CVP; Measure ScVO2; Echocardiogram (cardiac echo or cardiac ultrasound); Dynamic assessment of fluid responsiveness with passive leg raise or fluid challenge.    UZLY Yun  08/06/24  03:04 EDT

## 2024-08-06 NOTE — H&P
[facility]   HISTORY AND PHYSICAL    Patient Name: Shayy Ellison  : 1980  MRN: [id number]  Primary Care Physician:  Provider, No Known  Date of admission: 2024    Subjective   Subjective     Chief Complaint:   Chief Complaint   Patient presents with    Foot Pain     HPI:    Shayy Ellison is a 43 y.o. male presenting with acute right foot pain and edema, he reports recent injury when a sharp piece of plastic may have poked him on the right foot and caused a small abrasion.  His medical history includes diabetes, asthma.  He reports he has been ambulating for the past week without any issues until today.  At this time he has severe pain at rest, cannot tolerate weightbearing on his right foot.  He denies fever, recent illness, or chills.    His ED workup is significant for glucose of 405, CRP 23.6, lactate 1.4, WBC 13.56, and a ESR of 37.  3 view x-ray of his right foot does not identify any fracture or erosion, however there is a sclerotic focus in the second proximal phalanx.    Review of Systems   Constitutional:  No weight changes, fever, or chills. No night sweats, no fatigue, no malaise.    ENT/Mouth:  No hearing changes, no ear pain, no nasal congestion, no sinus pain, no hoarseness, no sore throat, no rhinorrhea, no dysphagia.   Eyes:  No eye pain, swelling, or redness. No foreign body, discharge. No vision changes.    Cardiovascular:  No chest pain, no shortness of air, no dyspnea on exertion, no orthopnea, no palpitations.     Respiratory:  No cough, no smoke exposure, no dyspnea.    Gastrointestinal:  No nausea, vomiting, or diarrhea. No constipation, or GI discomfort. No reflux pain, no anorexia, no dysphagia. No hematochezia or melena.    Genitourinary:  No dyspareunia, no dysuria, no urinary frequency. No hematuria, no urinary incontinence. No flank pain or urinary flow changes.   Musculoskeletal:  No back or neck pain. + Right foot pain. + Right ankle edema   Skin:  No skin lesions, no  pruritus, no hair changes.    Neuro:  No weakness, no numbness, no paresthesias, no loss of consciousness, no syncope, no dizziness, no headache, no coordination changes, no recent falls.   Psych:  No anxiety/panic, no depression, no insomnia, no personality changes, no delusions. Denies SI/HI, denies auditory or visual hallucinations. No social issues, no memory changes.      Heme/Lymph:  No bruising, or bleeding. No transfusions history, no lymphadenopathy.   Endocrine:  No polyuria, polydipsia, no temperature intolerances.       Personal History     Past Medical History:   Diagnosis Date    Asthma     Diabetes        Past Surgical History:   Procedure Laterality Date    ANKLE SURGERY Left 2005       Family History: family history includes Diabetes in his father and mother; Hypertension in his father and mother; Lung cancer in his father; No Known Problems in his brother. Otherwise pertinent FHx was reviewed and not pertinent to current issue.    Social History:  reports that he has been smoking cigarettes. He has a 22.5 pack-year smoking history. He has never been exposed to tobacco smoke. He has never used smokeless tobacco. He reports that he does not currently use alcohol. He reports that he does not use drugs.    Home Medications:  Canagliflozin, FreeStyle [place] [address] [place], metFORMIN ER, sertraline, and sildenafil    Allergies:  Allergies   Allergen Reactions    Latex Rash       Objective   Objective     Vitals:   Temp:  [98.4 °F (36.9 °C)] 98.4 °F (36.9 °C)  Heart Rate:  [] 113  Resp:  [18] 18  BP: (121-152)/(85-90) 152/85   Physical Exam:     Constitutional: Awake, alert. Well developed for age. Nontoxic appearing.   Eyes: PERRL x2, sclerae anicteric, no conjunctival injection. No EOM abnormalities.   HENT: NCAT, mucous membranes moist,   Neck: Supple, no thyromegaly, no lymphadenopathy, trachea midline  Respiratory: Clear to auscultation bilaterally, nonlabored respirations   Cardiovascular: RRR,  no murmurs, rubs, or gallops, palpable pedal pulses bilaterally. No appreciable edema.   Gastrointestinal: Positive bowel sounds, soft, nontender, not distended.   Musculoskeletal: No clubbing or cyanosis to extremities. No obvious deformities.  +Right ankle and foot edema, right  to palpation.  Psychiatric: Anxious, uncooperative. converses appropriately for age. Neurologic: Oriented x 3, strength symmetric in all extremities. Cranial nerves grossly intact to confrontation, speech clear  Skin: + Lateral right foot erythema.    Result Review    Result Review:  I have personally reviewed the results from the time of this admission to 8/5/2024 21:33 EDT and agree with these findings:  [x]  Laboratory list / accordion  []  Microbiology  [x]  Radiology  []  EKG/Telemetry   []  Cardiology/Vascular   []  Pathology  [x]  Old records  []  Other:  Most notable findings include:   WBC 13.56, CRP 23.66, ESR 37  Glucose 405      Assessment & Plan   Assessment / Plan     Brief Patient Summary:  Shayy Ellison is a 43 y.o. male who presents with acute right foot pain and right foot and ankle edema.  He recalls recent superficial injury when he stepped on a sharp piece of plastic.  ED workup is significant for leukocytosis,'s elevated CRP and ESR, lactate is 1.4.  Right foot x-ray without acute findings.    Active Hospital Problems:  Active Hospital Problems    Diagnosis     **Soft tissue infection      Plan:     Right foot wound, swelling  Leukocytosis  -Afebrile  -Severe pain s/p superficial injury to right foot  -Hurts to bear weight, x-ray negative  -WBC 13.56, CRP 23.66, ESR 37  -Blood cultures pending  -Vancomycin+ Zosyn  -MRSA PCR pending  -MRI to rule out bony involvement  -ID C/s      Diabetes  -Uncontrolled T2DM, glucose 405  -Insulin lispro 10 units stat  -ACHS POC glucose  -Hold metformin  -1 L normal saline bolus before IV contrast  -SSI      VTE Prophylaxis:  Mechanical VTE prophylaxis orders are  present.        CODE STATUS:    Level Of Support Discussed With: Patient  Code Status (Patient has no pulse and is not breathing): CPR (Attempt to Resuscitate)  Medical Interventions (Patient has pulse or is breathing): Full Support    Admission Status:  I believe this patient meets observation status.    Electronically signed by ZULY Yun, 08/05/24, 9:33 PM EDT.        75 minutes has been spent by [facility] providers in the care of this patient while under observation status      I have worn appropriate PPE during this patient encounter, sanitized my hands both with entering and exiting patient's room.    I have discussed plan of care with patient including advance care plan and/or surrogate decision maker.  Patient advises that their spouse, Criss Fong, will be their primary surrogate decision maker

## 2024-08-06 NOTE — PROGRESS NOTES
ED OBSERVATION PROGRESS/DISCHARGE SUMMARY    Date of Admission: 8/5/2024   LOS: 0 days   PCP: Provider, No Known    Subjective: Denies fever, chills.  Reports some improvement in swelling of his right foot since arrival.    Hospital Outcome:     Mr. Ellison was admitted to the ED observation unit for further evaluation due to acute right foot pain and right foot and ankle edema.  White blood cell count was 13.56 on arrival.  ESR and CRP were elevated.  Lactate was 1.4.  X-ray imaging of right foot showed no acute findings.  He had an MRI today which showed a presumed infected collection/abscess.  Generalized foot myositis and diffuse cellulitis noted.  Bone marrow edema and enhancement within the base of the fifth metatarsals and to a lesser degree within the plantar aspects of the cuboid due to reactive marrow edema or early osteomyelitis noted as well.  Podiatry is following, plan for the patient to go to the OR tomorrow.  Also consulted infectious disease.  They advised changing antibiotic to cefepime/vancomycin.    ROS:  General: no fevers, chills  Respiratory: no cough, dyspnea  Cardiovascular: no chest pain, palpitations  Abdomen: No abdominal pain, nausea, vomiting, or diarrhea  Neurologic: No focal weakness  Skin: Right foot swelling, pain    Objective   Physical Exam:  I have reviewed the vital signs.  Temp:  [98.2 °F (36.8 °C)-99.1 °F (37.3 °C)] 98.2 °F (36.8 °C)  Heart Rate:  [] 89  Resp:  [16-18] 16  BP: (111-152)/(78-90) 122/78  General Appearance:    Alert, cooperative, no distress  Head:    Normocephalic, atraumatic  Eyes:    Sclerae anicteric  Neck:   Supple, no mass  Lungs: Clear to auscultation bilaterally, respirations unlabored  Heart: Regular rate and rhythm, S1 and S2 normal, no murmur, rub or gallop  Abdomen:  Soft, nontender, bowel sounds active, nondistended  Extremities: No clubbing, cyanosis, or edema to lower extremities  Pulses:  2+ and symmetric in distal lower extremities  Skin: No  rashes   Neurologic: Oriented x3, Normal strength to extremities    Results Review:    I have reviewed the labs, radiology results and diagnostic studies.    Results from last 7 days   Lab Units 08/06/24  1037   WBC 10*3/mm3 15.48*   HEMOGLOBIN g/dL 11.4*   HEMATOCRIT % 35.4*   PLATELETS 10*3/mm3 413     Results from last 7 days   Lab Units 08/06/24  1037 08/05/24  1709   SODIUM mmol/L 131* 133*   POTASSIUM mmol/L 3.9 4.1   CHLORIDE mmol/L 98 98   CO2 mmol/L 24.0 22.0   BUN mg/dL 10 14   CREATININE mg/dL 0.66* 0.77   CALCIUM mg/dL 8.7 9.2   BILIRUBIN mg/dL  --  0.5   ALK PHOS U/L  --  86   ALT (SGPT) U/L  --  7   AST (SGOT) U/L  --  6   GLUCOSE mg/dL 258* 297*     Imaging Results (Last 24 Hours)       Procedure Component Value Units Date/Time    MRI Foot Right With & Without Contrast [[id number]] Collected: 08/06/24 0954     Updated: 08/06/24 1237    Narrative:      MRI RIGHT MIDFOOT AND PROXIMAL FOREFOOT WITH AND WITHOUT CONTRAST     HISTORY: Twisted right foot and stepped on plastic causing superficial  abrasion. Swelling and redness in the right foot.     TECHNIQUE:  MRI includes coronal, short axis T1, T1 fat-sat, T2 fat-sat,  axial T1, STIR, sagittal PD fat-saturated sequences.  Contrast was  administered IV followed by axial, coronal, sagittal T1 fat-saturated  sequences.     COMPARISON: Right foot x-rays 08/05/2024.     FINDINGS: A gel capsule was placed at the site of clinical interest and  this gelcap was dorsal to the distal 4th metatarsal. A 2nd gel capsule  was placed lateral to the central to anterior calcaneus. There is  generalized edema and swelling in subcutaneous fat as well as  enhancement that is nonspecific though is consistent with cellulitis.  There is also generalized muscular edema consistent with myositis.     Within the distal aspect of the lateral cuneiform there is a focal area  of T1 hypointense and STIR hyperintense signal that is attributed to  arthritic changes at the 3rd TMT joint.  There is also bone marrow edema  and enhancement within the base of the 5th metatarsal. There is a  nonenhancing fluid collection at the lateral anterior hindfoot, midfoot,  and proximal forefoot. The proximal extent of the visualized collection  is within the abductor digiting minimi muscle and flexor digiti minimi  muscles and the collection extends plantar to the peroneus longus tendon  adjacent to the plantar cuboid. Collection extends distally within the  abductor digiting minimi muscle to the coronal level of the joints in  the mid-third and distal-third of the 5th metatarsal. The AP extension  of this nonenhancing collection and presumed infected collection/abscess  is approximately 9.2 cm. Collection measures up to 3 cm transverse  dimension by 2.5 cm in height. The distal 1st through 4th proximal  phalanges and the tip of the 5th distal phalanx are excluded from the  field-of-view.     There is suspected peroneus brevis split tear at the proximal margin of  the field-of-view of this exam.       Impression:      1. Plantar-lateral anterior hindfoot, midfoot, proximal forefoot  nonenhancing collection and presumed infected collection/abscess  predominantly centered within the abductor digiting minimi muscle and  extends from the level of the central to anterior calcaneus distally to  the coronal level of the junction of the mid-third and distal-third of  the 5th metatarsal. The collection contacts the plantar aspect of the  peroneus longus tendon where there is presumed infectious tenosynovitis  and there is extensive surrounding myositis. Generalized foot myositis  and diffuse cellulitis.   2. Bone marrow edema and enhancement within the base of the 5th  metatarsal and to a lesser degree within the plantar aspect of the  cuboid due to reactive marrow edema or early osteomyelitis.  3. 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  4. Suspect peroneus brevis split tear at the proximal  margin of the  field-of-view of this exam.     This report was finalized on 8/6/2024 12:34 PM by Dr. Catalino Lopez M.D on Workstation: [id number]       XR Foot 3+ View Right [[id number]] Collected: 08/05/24 1820     Updated: 08/05/24 1825    Narrative:      XR FOOT 3+ VW RIGHT-     INDICATIONS: Redness and swelling, pain.     TECHNIQUE: 3 VIEWS OF THE RIGHT FOOT     COMPARISON: None available     FINDINGS:     No acute fracture, erosion, or dislocation is identified. If there is  further clinical concern, MRI or bone scan could be obtained for further  evaluation. A sclerotic focus in the second proximal phalanx, although  nonspecific, may represent bone island.       Impression:         As described.           This report was finalized on 8/5/2024 6:22 PM by Dr. Donnell Alfredo M.D on Workstation: [id number]               I have reviewed the medications.  ---------------------------------------------------------------------------------------------  Assessment & Plan   Assessment/Problem List    Soft tissue infection    Sepsis    Cellulitis of right foot    Abscess of right foot    Diabetic foot infection      Plan:    Right foot wound/abscess  Osteomyelitis  Leukocytosis  -WBC 13.56, CRP 23.66, ESR 37  -Blood cultures pending  -Continue IV antibiotics, vancomycin, cefepime  -MRI showed a presumed infected collection/abscess.  Generalized foot myositis and diffuse cellulitis noted.  Bone marrow edema and enhancement within the base of the fifth metatarsals and to a lesser degree within the plantar aspects of the cuboid due to reactive marrow edema or early osteomyelitis noted as well.  -Podiatry consulted  -Infectious disease following  -Plan for OR tomorrow warning        Uncontrolled type 2 diabetes  -Hemoglobin A1c 12.5  -Correction scale ordered  -Hold oral diabetic medications while inpatient  -Consult diabetic educator     VTE Prophylaxis:  Mechanical VTE prophylaxis orders are present.    Disposition:  Will admit to the hospitalist service for further management    This note will serve as a progress/transfer note    Daylin Mccabe, APRN 08/06/24 16:09 EDT    I have worn appropriate PPE during this patient encounter, sanitized my hands both with entering and exiting patient's room.    54 minutes has been spent by [facility] providers in the care of this patient while under observation status

## 2024-08-06 NOTE — CONSULTS
"Referring Provider: ZULY Hernandez    Reason for Consultation: \"diabetic foot wound, concern for osteomyelitis\"    History of present illness:  Shayy Ellison is a 43 y.o. who I am asked to evaluate and give opinion for \"diabetic foot wound, concern for osteomyelitis.\" History is obtained from the patient and review of the old medical records which I summarize/synthesize as follows: He presented to the emergency room yesterday afternoon with about 10 days of right foot pain following a twisting injury complicated by a small abrasion.  He had progressive erythema and swelling on the bottom of the foot without alleviating factors.  No associated fever or chills.  He got to the point where he had trouble walking so he came to the emergency room for further evaluation.  He had previously been seen at urgent care for this problem on 7/27 and was prescribed Bactrim DS p.o. twice daily x 14 days.    Labs were notable for WBC 13, lactate 1.4, CRP 23, sodium 133, creatinine 0.77, A1c 12.5%, and MRSA nares screen positive.  Plain film of the right foot cannot entirely exclude osteomyelitis so an MRI was obtained and showed a probable right foot abscess, tenosynovitis, myositis, cellulitis, and possible early osteomyelitis.    He says he has a history of shoulder skin infection many years ago but nothing recently.    Past Medical History:   Diagnosis Date    Asthma     Diabetes        Past Surgical History:   Procedure Laterality Date    ANKLE SURGERY Left 2005       Social History:  Lives in [place], [place]    Works as a     Antibiotic allergies and intolerances:  None    Medications:    Current Facility-Administered Medications:     acetaminophen (TYLENOL) tablet 1,000 mg, 1,000 mg, Oral, Q8H PRN, Ruben Mcmillan APRN, 1,000 mg at 08/06/24 0507    sennosides-docusate (PERICOLACE) 8.6-50 MG per tablet 2 tablet, 2 tablet, Oral, BID PRN **AND** polyethylene glycol (MIRALAX) packet 17 g, 17 g, Oral, Daily PRN " **AND** bisacodyl (DULCOLAX) EC tablet 5 mg, 5 mg, Oral, Daily PRN **AND** bisacodyl (DULCOLAX) suppository 10 mg, 10 mg, Rectal, Daily PRN, Ruben Mcmillan APRN    Calcium Replacement - Follow Nurse / BPA Driven Protocol, , Does not apply, PRN, Ruben Mcmillan APRN    dextrose (D50W) (25 g/50 mL) IV injection 25 g, 25 g, Intravenous, Q15 Min PRN, Ruben Mcmillan APRN    dextrose (GLUTOSE) oral gel 15 g, 15 g, Oral, Q15 Min PRN, Ruben Mcmillan APRN    glucagon (GLUCAGEN) injection 1 mg, 1 mg, Intramuscular, Q15 Min PRN, Ruben Mcmillan APRN    HYDROmorphone (DILAUDID) injection 0.5 mg, 0.5 mg, Intravenous, Q2H PRN, Ruben Mcmillan APRN, 0.5 mg at 08/06/24 1306    ibuprofen (ADVIL,MOTRIN) tablet 600 mg, 600 mg, Oral, Q6H PRN, Ruben Mcmillan APRN, 600 mg at 08/06/24 1223    insulin lispro (HUMALOG/ADMELOG) injection 2-9 Units, 2-9 Units, Subcutaneous, 4x Daily AC & at Bedtime, Ruben Mcmillan APRN, 4 Units at 08/06/24 1223    Magnesium Standard Dose Replacement - Follow Nurse / BPA Driven Protocol, , Does not apply, PRN, Ruben Mcmillan APRN    nitroglycerin (NITROSTAT) SL tablet 0.4 mg, 0.4 mg, Sublingual, Q5 Min PRN, Ruben Mcmillan APRN    Pharmacy to dose vancomycin, , Does not apply, Continuous PRN, Ruben Mcmillan APRN    Phosphorus Replacement - Follow Nurse / BPA Driven Protocol, , Does not apply, PRHipolito FLOWERS Kaidi, APRN    piperacillin-tazobactam (ZOSYN) 3.375 g IVPB in 100 mL NS MBP (CD), 3.375 g, Intravenous, Q8H, Ruben Mcmillan APRN, 3.375 g at 08/06/24 1258    Potassium Replacement - Follow Nurse / BPA Driven Protocol, , Does not apply, PRN, Ruben Mcmillan APRN    sertraline (ZOLOFT) tablet 50 mg, 50 mg, Oral, Daily, Ruben Mcmillan APRN, 50 mg at 08/06/24 0949    [COMPLETED] Insert Peripheral IV, , , Once **AND** sodium chloride 0.9 % flush 10 mL, 10 mL, Intravenous, PRN, Ruben Mcmillan APRN    sodium chloride 0.9 % flush 10 mL, 10 mL, Intravenous, Q12H, Ruben Mcmillan APRN, 10 mL at 08/06/24 1002    sodium chloride 0.9 % flush 10 mL, 10 mL,  "Intravenous, PRN, Ruben Mcmillan APRN    sodium chloride 0.9 % infusion 40 mL, 40 mL, Intravenous, PRN, Ruben Mcmillan APRN    vancomycin IVPB 1500 mg in 0.9% NaCl (Premix) 500 mL, 1,500 mg, Intravenous, Q12H, Ruben Mcmillan APRN, Last Rate: 333.3 mL/hr at 08/06/24 1103, 1,500 mg at 08/06/24 1103      Objective   Vital Signs   Temp:  [98.2 °F (36.8 °C)-99.1 °F (37.3 °C)] 98.4 °F (36.9 °C)  Heart Rate:  [] 93  Resp:  [16-18] 18  BP: (111-152)/(78-90) 122/84    Physical Exam:   General: awake, alert, NAD   Eyes: no scleral icterus  Cardiovascular: NR  Respiratory: normal work of breathing on ambient air  GI: Abdomen is soft, not tender  :  no Veloz catheter  Skin: Erythema and swelling on the bottom of the right foot; no significant open skin wounds  Neurological: Alert and oriented x 3  Psychiatric: Normal mood and affect   Vasc: PIV w/o erythema    Labs:     Lab Results   Component Value Date    WBC 15.48 (H) 08/06/2024    HGB 11.4 (L) 08/06/2024    HCT 35.4 (L) 08/06/2024    MCV 80.6 08/06/2024     08/06/2024       Lab Results   Component Value Date    GLUCOSE 258 (H) 08/06/2024    BUN 10 08/06/2024    CREATININE 0.66 (L) 08/06/2024    BCR 15.2 08/06/2024    CO2 24.0 08/06/2024    CALCIUM 8.7 08/06/2024    PROTENTOTREF 7.1 12/07/2023    ALBUMIN 3.6 08/05/2024    LABIL2 2.7 12/07/2023    AST 6 08/05/2024    ALT 7 08/05/2024     Lab Results   Component Value Date    CRP 23.66 (H) 08/05/2024     Lab Results   Component Value Date    HGBA1C 12.50 (H) 08/06/2024     No results found for: \"VANCOPEAK\", \"VANCOTROUGH\", \"VANCORANDOM\"    Microbiology:  8/5 BCx: Pending  8/5 MRSA nares PCR: Positive    Radiology:  MRI R Foot:   \"1. Plantar-lateral anterior hindfoot, midfoot, proximal forefoot   nonenhancing collection and presumed infected collection/abscess   predominantly centered within the abductor digiting minimi muscle and   extends from the level of the central to anterior calcaneus distally to   the coronal " "level of the junction of the mid-third and distal-third of   the 5th metatarsal. The collection contacts the plantar aspect of the   peroneus longus tendon where there is presumed infectious tenosynovitis   and there is extensive surrounding myositis. Generalized foot myositis   and diffuse cellulitis.   2. Bone marrow edema and enhancement within the base of the 5th   metatarsal and to a lesser degree within the plantar aspect of the   cuboid due to reactive marrow edema or early osteomyelitis.   3. 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   4. Suspect peroneus brevis split tear at the proximal margin of the   field-of-view of this exam. \"    XR R Foot:   \"No acute fracture, erosion, or dislocation is identified. If there is   further clinical concern, MRI or bone scan could be obtained for further   evaluation. A sclerotic focus in the second proximal phalanx, although   nonspecific, may represent bone island.\"    ASSESSMENT/PLAN:  Cellulitis, abscess, tenosynovitis, myositis, and probable early osteomyelitis of the right foot  Uncontrolled type 2 diabetes -last A1c 12.5%  MRSA carrier  Hyponatremia  Elevated C-reactive protein    I recommend that he continue vancomycin with goal -600.  I will change Zosyn to cefepime 2 g IV every 8 hours.  Noted plans for operating room tomorrow for surgical debridement.  I will follow-up operative report and cultures.    While the patient is on vancomycin, vancomycin levels will be ordered and reviewed with dose adjustments made as needed. The patient will have a daily creatinine level checked while on vancomycin as part of monitoring this medication.     Going forward, stricter glucose control will be of the utmost importance to promote healing and decrease risk of future infection.    ID will follow.     "

## 2024-08-06 NOTE — CONSULTS
Patient Name:  Shayy Ellison  YOB: 1980  MRN:  [id number]  Admit Date:  8/5/2024  Patient Care Team:  Provider, No Known as PCP - General      Subjective   History Present Illness     Chief Complaint   Patient presents with    Foot Pain     43-year-old man who presented to hospital with 1 and half weeks of right foot pain.  He started experiencing erythema and swelling.  He was seen at an urgent care and was prescribed Bactrim.  Unfortunately his symptoms did not improve so he presented to hospital for further evaluation.  An MRI was obtained which was concerning for bone marrow edema as well as an infected collection/abscess.  Podiatry infectious disease were consulted.  He is currently on vancomycin and cefepime.      Personal History     Past Medical History:   Diagnosis Date    Asthma     Diabetes      Past Surgical History:   Procedure Laterality Date    ANKLE SURGERY Left 2005     Family History   Problem Relation Age of Onset    Hypertension Mother     Diabetes Mother     Hypertension Father     Diabetes Father     Lung cancer Father     No Known Problems Brother      Social History     Tobacco Use    Smoking status: Every Day     Current packs/day: 1.50     Average packs/day: 1.5 packs/day for 15.0 years (22.5 ttl pk-yrs)     Types: Cigarettes     Passive exposure: Never    Smokeless tobacco: Never   Vaping Use    Vaping status: Never Used   Substance Use Topics    Alcohol use: Not Currently    Drug use: Never     No current facility-administered medications on file prior to encounter.     Current Outpatient Medications on File Prior to Encounter   Medication Sig Dispense Refill    Continuous Blood Gluc  (VoulezVousDineryle Rochelle 14 Day Burdine) device Use 1 each Take As Directed. 1 each 0    Canagliflozin (Invokana) 100 MG tablet tablet Take 1 tablet by mouth Daily for 270 days. 90 tablet 2    metFORMIN ER (GLUCOPHAGE-XR) 500 MG 24 hr tablet Take 2 tablets by mouth 2 (Two) Times a Day With  Meals for 180 days. 360 tablet 1    sertraline (Zoloft) 50 MG tablet Take 1 tablet by mouth Daily for 60 days. 60 tablet 1    sildenafil (VIAGRA) 50 MG tablet Take 1 tablet by mouth Daily As Needed for Erectile Dysfunction for up to 90 days. 30 tablet 1     Allergies   Allergen Reactions    Latex Rash       Objective    Objective     Vital Signs  Temp:  [98.2 °F (36.8 °C)-99.1 °F (37.3 °C)] 98.4 °F (36.9 °C)  Heart Rate:  [] 93  Resp:  [16-18] 18  BP: (111-152)/(78-90) 122/84  SpO2:  [96 %-98 %] 96 %  on   ;   Device (Oxygen Therapy): room air  Body mass index is 26.05 kg/m².    Physical Exam  Constitutional:       General: He is not in acute distress.  HENT:      Head: Normocephalic and atraumatic.   Cardiovascular:      Rate and Rhythm: Normal rate and regular rhythm.   Pulmonary:      Effort: Pulmonary effort is normal. No respiratory distress.   Abdominal:      General: There is no distension.      Palpations: Abdomen is soft.      Tenderness: There is no abdominal tenderness.   Musculoskeletal:      Comments: Right foot erythema and tenderness noted    Neurological:      General: No focal deficit present.      Mental Status: He is alert and oriented to person, place, and time.         Results Review:  I reviewed the patient's new clinical results.  I reviewed the patient's new imaging results and agree with the interpretation.  I reviewed the patient's other test results and agree with the interpretation  I personally viewed and interpreted the patient's EKG/Telemetry data  Discussed with ED provider.    Lab Results (last 24 hours)       Procedure Component Value Units Date/Time    CBC & Differential [[id number]]  (Abnormal) Collected: 08/05/24 1709    Specimen: Blood Updated: 08/05/24 1717    Narrative:      The following orders were created for panel order CBC & Differential.  Procedure                               Abnormality         Status                     ---------                                -----------         ------                     CBC Auto Differential[[id number]]        Abnormal            Final result                 Please view results for these tests on the individual orders.    Comprehensive Metabolic Panel [[id number]]  (Abnormal) Collected: 08/05/24 1709    Specimen: Blood Updated: 08/05/24 1734     Glucose 297 mg/dL      BUN 14 mg/dL      Creatinine 0.77 mg/dL      Sodium 133 mmol/L      Potassium 4.1 mmol/L      Chloride 98 mmol/L      CO2 22.0 mmol/L      Calcium 9.2 mg/dL      Total Protein 7.3 g/dL      Albumin 3.6 g/dL      ALT (SGPT) 7 U/L      AST (SGOT) 6 U/L      Alkaline Phosphatase 86 U/L      Total Bilirubin 0.5 mg/dL      Globulin 3.7 gm/dL      A/G Ratio 1.0 g/dL      BUN/Creatinine Ratio 18.2     Anion Gap 13.0 mmol/L      eGFR 114.6 mL/min/1.73     Narrative:      GFR Normal >60  Chronic Kidney Disease <60  Kidney Failure <15      Sedimentation Rate [[id number]]  (Abnormal) Collected: 08/05/24 1709    Specimen: Blood Updated: 08/05/24 1724     Sed Rate 37 mm/hr     C-reactive Protein [[id number]]  (Abnormal) Collected: 08/05/24 1709    Specimen: Blood Updated: 08/05/24 1734     C-Reactive Protein 23.66 mg/dL     Lactic Acid, Plasma [[id number]]  (Normal) Collected: 08/05/24 1709    Specimen: Blood Updated: 08/05/24 1732     Lactate 1.4 mmol/L     CBC Auto Differential [[id number]]  (Abnormal) Collected: 08/05/24 1709    Specimen: Blood Updated: 08/05/24 1717     WBC 13.56 10*3/mm3      RBC 4.71 10*6/mm3      Hemoglobin 12.1 g/dL      Hematocrit 38.6 %      MCV 82.0 fL      MCH 25.7 pg      MCHC 31.3 g/dL      RDW 13.2 %      RDW-SD 39.6 fl      MPV 8.6 fL      Platelets 450 10*3/mm3      Neutrophil % 79.4 %      Lymphocyte % 12.1 %      Monocyte % 5.4 %      Eosinophil % 2.1 %      Basophil % 0.4 %      Immature Grans % 0.6 %      Neutrophils, Absolute 10.77 10*3/mm3      Lymphocytes, Absolute 1.64 10*3/mm3      Monocytes, Absolute 0.73 10*3/mm3      Eosinophils, Absolute 0.29  10*3/mm3      Basophils, Absolute 0.05 10*3/mm3      Immature Grans, Absolute 0.08 10*3/mm3      nRBC 0.0 /100 WBC     POC Glucose Once [[id number]]  (Abnormal) Collected: 08/05/24 2106    Specimen: Blood Updated: 08/05/24 2109     Glucose 405 mg/dL     Blood Culture - Blood, Arm, Left [[id number]] Collected: 08/05/24 2154    Specimen: Blood from Arm, Left Updated: 08/05/24 2207    POC Glucose Once [[id number]]  (Abnormal) Collected: 08/05/24 2340    Specimen: Blood Updated: 08/05/24 2341     Glucose 225 mg/dL     MRSA Screen, PCR (Inpatient) - Swab, Nares [[id number]]  (Abnormal) Collected: 08/05/24 2354    Specimen: Swab from Nares Updated: 08/06/24 0136     MRSA PCR MRSA Detected    Narrative:      The negative predictive value of this diagnostic test is high and should only be used to consider de-escalating anti-MRSA therapy. A positive result may indicate colonization with MRSA and must be correlated clinically.    POC Glucose Once [[id number]]  (Abnormal) Collected: 08/06/24 0756    Specimen: Blood Updated: 08/06/24 0757     Glucose 235 mg/dL     Basic Metabolic Panel [[id number]]  (Abnormal) Collected: 08/06/24 1037    Specimen: Blood Updated: 08/06/24 1109     Glucose 258 mg/dL      BUN 10 mg/dL      Creatinine 0.66 mg/dL      Sodium 131 mmol/L      Potassium 3.9 mmol/L      Chloride 98 mmol/L      CO2 24.0 mmol/L      Calcium 8.7 mg/dL      BUN/Creatinine Ratio 15.2     Anion Gap 9.0 mmol/L      eGFR 120.1 mL/min/1.73     Narrative:      GFR Normal >60  Chronic Kidney Disease <60  Kidney Failure <15      CBC & Differential [[id number]]  (Abnormal) Collected: 08/06/24 1037    Specimen: Blood Updated: 08/06/24 1049    Narrative:      The following orders were created for panel order CBC & Differential.  Procedure                               Abnormality         Status                     ---------                               -----------         ------                     CBC Auto Differential[[id number]]         Abnormal            Final result                 Please view results for these tests on the individual orders.    CBC Auto Differential [[id number]]  (Abnormal) Collected: 08/06/24 1037    Specimen: Blood Updated: 08/06/24 1049     WBC 15.48 10*3/mm3      RBC 4.39 10*6/mm3      Hemoglobin 11.4 g/dL      Hematocrit 35.4 %      MCV 80.6 fL      MCH 26.0 pg      MCHC 32.2 g/dL      RDW 13.2 %      RDW-SD 38.3 fl      MPV 8.5 fL      Platelets 413 10*3/mm3      Neutrophil % 78.3 %      Lymphocyte % 12.8 %      Monocyte % 6.4 %      Eosinophil % 1.5 %      Basophil % 0.3 %      Immature Grans % 0.7 %      Neutrophils, Absolute 12.12 10*3/mm3      Lymphocytes, Absolute 1.98 10*3/mm3      Monocytes, Absolute 0.99 10*3/mm3      Eosinophils, Absolute 0.23 10*3/mm3      Basophils, Absolute 0.05 10*3/mm3      Immature Grans, Absolute 0.11 10*3/mm3      nRBC 0.0 /100 WBC     Hemoglobin A1c [[id number]]  (Abnormal) Collected: 08/06/24 1037    Specimen: Blood Updated: 08/06/24 1103     Hemoglobin A1C 12.50 %     Narrative:      Hemoglobin A1C Ranges:    Increased Risk for Diabetes  5.7% to 6.4%  Diabetes                     >= 6.5%  Diabetic Goal                < 7.0%    POC Glucose Once [[id number]]  (Abnormal) Collected: 08/06/24 1121    Specimen: Blood Updated: 08/06/24 1122     Glucose 214 mg/dL             No results found for this or any previous visit.    Imaging Results (Last 24 Hours)       Procedure Component Value Units Date/Time    MRI Foot Right With & Without Contrast [[id number]] Collected: 08/06/24 0954     Updated: 08/06/24 1237    Narrative:      MRI RIGHT MIDFOOT AND PROXIMAL FOREFOOT WITH AND WITHOUT CONTRAST     HISTORY: Twisted right foot and stepped on plastic causing superficial  abrasion. Swelling and redness in the right foot.     TECHNIQUE:  MRI includes coronal, short axis T1, T1 fat-sat, T2 fat-sat,  axial T1, STIR, sagittal PD fat-saturated sequences.  Contrast was  administered IV followed by axial,  coronal, sagittal T1 fat-saturated  sequences.     COMPARISON: Right foot x-rays 08/05/2024.     FINDINGS: A gel capsule was placed at the site of clinical interest and  this gelcap was dorsal to the distal 4th metatarsal. A 2nd gel capsule  was placed lateral to the central to anterior calcaneus. There is  generalized edema and swelling in subcutaneous fat as well as  enhancement that is nonspecific though is consistent with cellulitis.  There is also generalized muscular edema consistent with myositis.     Within the distal aspect of the lateral cuneiform there is a focal area  of T1 hypointense and STIR hyperintense signal that is attributed to  arthritic changes at the 3rd TMT joint. There is also bone marrow edema  and enhancement within the base of the 5th metatarsal. There is a  nonenhancing fluid collection at the lateral anterior hindfoot, midfoot,  and proximal forefoot. The proximal extent of the visualized collection  is within the abductor digiting minimi muscle and flexor digiti minimi  muscles and the collection extends plantar to the peroneus longus tendon  adjacent to the plantar cuboid. Collection extends distally within the  abductor digiting minimi muscle to the coronal level of the joints in  the mid-third and distal-third of the 5th metatarsal. The AP extension  of this nonenhancing collection and presumed infected collection/abscess  is approximately 9.2 cm. Collection measures up to 3 cm transverse  dimension by 2.5 cm in height. The distal 1st through 4th proximal  phalanges and the tip of the 5th distal phalanx are excluded from the  field-of-view.     There is suspected peroneus brevis split tear at the proximal margin of  the field-of-view of this exam.       Impression:      1. Plantar-lateral anterior hindfoot, midfoot, proximal forefoot  nonenhancing collection and presumed infected collection/abscess  predominantly centered within the abductor digiting minimi muscle and  extends from  the level of the central to anterior calcaneus distally to  the coronal level of the junction of the mid-third and distal-third of  the 5th metatarsal. The collection contacts the plantar aspect of the  peroneus longus tendon where there is presumed infectious tenosynovitis  and there is extensive surrounding myositis. Generalized foot myositis  and diffuse cellulitis.   2. Bone marrow edema and enhancement within the base of the 5th  metatarsal and to a lesser degree within the plantar aspect of the  cuboid due to reactive marrow edema or early osteomyelitis.  3. 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  4. Suspect peroneus brevis split tear at the proximal margin of the  field-of-view of this exam.     This report was finalized on 8/6/2024 12:34 PM by Dr. Catalino Lopez M.D on Workstation: [id number]       XR Foot 3+ View Right [[id number]] Collected: 08/05/24 1820     Updated: 08/05/24 1825    Narrative:      XR FOOT 3+ VW RIGHT-     INDICATIONS: Redness and swelling, pain.     TECHNIQUE: 3 VIEWS OF THE RIGHT FOOT     COMPARISON: None available     FINDINGS:     No acute fracture, erosion, or dislocation is identified. If there is  further clinical concern, MRI or bone scan could be obtained for further  evaluation. A sclerotic focus in the second proximal phalanx, although  nonspecific, may represent bone island.       Impression:         As described.           This report was finalized on 8/5/2024 6:22 PM by Dr. Donnell Alfredo M.D on Workstation: [id number]                   ECG 12 Lead Rhythm Change   Final Result   HEART RATE=84  bpm   RR Juxzzfth=318  ms   NY Wqjptlvx=130  ms   P Horizontal Axis=14  deg   P Front Axis=55  deg   QRSD Interval=93  ms   QT Eqalmexi=794  ms   HOtC=194  ms   QRS Axis=49  deg   T Wave Axis=23  deg   - NORMAL ECG -   Sinus rhythm   No Prior Tracing for Comparison   Electronically Signed By: Hamzah Mendez ([facility]) 2024-08-06 08:06:41   Date and Time of  Study:2024-08-06 07:14:55      Telemetry Scan   Final Result      Telemetry Scan   Final Result      Telemetry Scan   Final Result                 Assessment/Plan     Active Hospital Problems    Diagnosis  POA    **Soft tissue infection [L08.9]  Yes    Sepsis [A41.9]  Unknown    Diabetic foot infection [E11.628, L08.9]  Yes    Cellulitis of right foot [L03.115]  Unknown    Abscess of right foot [L02.611]  Unknown      Resolved Hospital Problems   No resolved problems to display.     Right foot wound  Osteomyelitis  SIRS  Abscess of the right foot  On vancomycin and cefepime.  ID following.    Type 2 diabetes  Blood sugars as high as 405. Currently in the 200s.   A1c 12.5. Schedule latnus 25u qhs + lispro 5u TIDAC +SSI    Nicotine dependence  patch      I discussed the patient's findings and my recommendations with patient, nursing staff, and ED provider.    VTE Prophylaxis - SCDs.  Code Status - Full code.       Andrea Pandya MD  [facility]  08/06/24  14:47 EDT

## 2024-08-06 NOTE — PROGRESS NOTES
"[facility]: Vancomycin Pharmacokinetic Initial Consult Note    Shayy Ellison is a 43 y.o. male who is on day 1 of pharmacy to dose vancomycin.    Indication: Skin and Soft Tissue infection  Consulting Provider: ZULY Yun  Planned Duration of Therapy: 3 days  Loading Dose Ordered or Given: 1500 mg on 8/5 at 2212  MRSA PCR performed: 8/5; Result: MRSA detected  Culture/Source: blood culture pending  Target: -600 mg/L.hr   Other Antimicrobials: Zosyn 3.375gm IV q8h    Vitals/Labs  Ht: 175.3 cm (69\"); Wt: 80 kg (176 lb 6.4 oz)  Temp Readings from Last 1 Encounters:   08/05/24 99.1 °F (37.3 °C) (Oral)    Estimated Creatinine Clearance: 141.4 mL/min (by C-G formula based on SCr of 0.77 mg/dL).     Results from last 7 days   Lab Units 08/05/24  1709   CREATININE mg/dL 0.77   WBC 10*3/mm3 13.56*     Assessment/Plan:    Vancomycin Dose:   1500 mg IV every  12  hours  Predictive AUC level for the dose ordered is 583 mg/L.hr, which is within the target of 400-600 mg/L.hr  Vanc Trough has been ordered for 8/6 at 2130     Pharmacy will follow patient's kidney function and will adjust doses and obtain levels as necessary. Thank you for involving pharmacy in this patient's care. Please contact pharmacy with any questions or concerns.                           Mayelin Paulson, [facility]  Clinical Pharmacist    "

## 2024-08-06 NOTE — CONSULTS
"Diabetes Education  Assessment/Teaching    Patient Name:  Shayy Ellison  YOB: 1980  MRN: [id number]  Admit Date:  8/5/2024      Assessment Date:  8/6/2024  Flowsheet Row Most Recent Value   General Information     Referral From: A1c, Database  [A1C 12.5%]   Height 175.3 cm (69\")   Height Method Stated   Weight 80 kg (176 lb 6.4 oz)   Weight Method Bed scale   Patient expressed need how to manage his diabets   Pregnancy Assessment    Diabetes History    What type of diabetes do you have? Type 2   Length of Diabetes Diagnosis 6 - 10 years   Current DM knowledge poor   Have you had diabetes education/teaching in the past? no   Do you test your blood sugar at home? no   Have you had high blood sugar? (>140mg/dl) yes   How often do you have high blood sugar? frequently   What makes it difficult for you to take care of your diabetes or yourself? no health insurance   Do you have any diabetes complications? amputations, circulation problems, foot ulcers   Education Preferences    What areas of diabetes would you like to learn about? avoiding high blood sugar, medications for diabetes, testing my blood sugar at home   Nutrition Information    Assessment Topics    Healthy Eating - Assessment Needs education   Being Active - Assessment Needs education   Taking Medication - Assessment Needs education   Problem Solving - Assessment Needs education   Reducing Risk - Assessment Needs education   Healthy Coping - Assessment Needs education   Monitoring - Assessment Needs education   DM Goals    Healthy Eating - Goal 0-7 days from discharge   Taking Medication - Goal 0-7 days from discharge   Problem Solving - Goal 0-7 days from discharge   Monitoring - Goal 0-7 days from discharge   Contact Plan Outpatient DM education referral, 30-90 days from discharge            Flowsheet Row Most Recent Value   DM Education Needs    Meter Has own   Medication Oral, Insulin, Side effects, Administration, Pen  [pt on invokana and " glucophage at home]   Problem Solving Hyperglycemia   Healthy Coping Appropriate   Discharge Plan Home, Follow-up with MD   Motivation Moderate   Teaching Method Explanation, Discussion, Demonstration, Handouts, Teach back   Patient Response Verbalized understanding, Needs reinforcement, Demonstrates adequately              Other Comments: Discussed with pt his diabetes management.He states he has had diabetes  a number of years but is not currently doing anything to manage it. He has a Bg meter and a few Rochelle devices at home and has not used them. He states he eats and drinks whatever he wants. He does not have a PCP at this time.He currently has no health insurance but is supposed to get some in October.  Pt was instructed on insulin therapy and states he would be able to go home using it if needed. He also states he will get his Rochelle out to start using. Written instructions to be sent home with pt and contact information him to use for any F/U needs,especially OP education.            Electronically signed by:  Nu Huff RN  08/06/24 14:19 EDT

## 2024-08-06 NOTE — CASE MANAGEMENT/SOCIAL WORK
Discharge Planning Assessment  [facility]     Patient Name: Shayy Ellison  MRN: [id number]  Today's Date: 8/6/2024    Admit Date: 8/5/2024    Plan: Plans to return home at nidia/Raleigh Mehta RN   Discharge Needs Assessment       Row Name 08/06/24 1016       Living Environment    People in Home child(benita), dependent;spouse    Name(s) of People in Home spouse Maryellen and children ages 17, 12 and 10    Current Living Arrangements home    Potentially Unsafe Housing Conditions none    In the past 12 months has the electric, gas, oil, or water company threatened to shut off services in your home? No    Primary Care Provided by self    Provides Primary Care For no one    Family Caregiver if Needed spouse    Family Caregiver Names Maryellen    Quality of Family Relationships supportive    Able to Return to Prior Arrangements yes       Resource/Environmental Concerns    Resource/Environmental Concerns none    Transportation Concerns none       Transportation Needs    In the past 12 months, has lack of transportation kept you from medical appointments or from getting medications? no    In the past 12 months, has lack of transportation kept you from meetings, work, or from getting things needed for daily living? No       Transition Planning    Patient/Family Anticipates Transition to home with family    Patient/Family Anticipated Services at Transition none    Transportation Anticipated family or friend will provide       Discharge Needs Assessment    Equipment Currently Used at Home crutches;glucometer;bp cuff    Concerns to be Addressed no discharge needs identified    Anticipated Changes Related to Illness none    Equipment Needed After Discharge none    Provided Post Acute Provider List? N/A    Provided Post Acute Provider Quality & Resource List? N/A                   Discharge Plan       Row Name 08/06/24 1018       Plan    Plan Plans to return home at Chari Mehta RN    Patient/Family in Agreement with Plan yes    Provided Post  Acute Provider List? N/A    Provided Post Acute Provider Quality & Resource List? N/A    Plan Comments Spoke w/ pt at bedside w/ his permission. Introduced self and explained role. All info on facesheet verified. States he has no PCP at present time, but should be able to return to former PCP as soon as his insruance through his employer is effective, which should be in about 1-1.5 weeks, and will be David. Lives in [place] w/ no steps to enter w/ wife Maryellen and children ages 17, 12 and 10; able to navigate around home w/o difficulty. Uses crutches, B/P cuff and BGM at home. denies need for any DME or community resources at d/c. Uses [facility] on [address], and is able to obtain and pay for meds. States he has concerns about hospital bill b/c of time lag before insurance is effective; advised to call [facility] hospital # and ask for Financial Assistance Dep't after he receives statement. To return home at d/c, w/ family's assist as needed; family/friend will transport; agreeable w/ plan. CM will continue to follow-MARITO Mehta RN                  Continued Care and Services - Admitted Since 8/5/2024    No active coordination exists for this encounter.          Demographic Summary       Row Name 08/06/24 1015       General Information    Admission Type observation    Arrived From emergency department    Referral Source nursing    Reason for Consult discharge planning;insurance concerns    Preferred Language English       Contact Information    Permission Granted to Share Info With                    Functional Status       Row Name 08/06/24 1015       Functional Status    Usual Activity Tolerance good    Current Activity Tolerance good       Functional Status, IADL    Medications independent    IADL Comments wife does most meal prep, housekeeping, laundry and shopping       Mental Status    General Appearance WDL WDL       Mental Status Summary    Recent Changes in Mental Status/Cognitive  Functioning no changes                   Psychosocial       Row Name 08/06/24 1016       Behavior WDL    Behavior WDL WDL       Emotion Mood WDL    Emotion/Mood/Affect WDL WDL       Speech WDL    Speech WDL WDL       Perceptual State WDL    Perceptual State WDL WDL       Thought Process WDL    Thought Process WDL WDL       Intellectual Performance WDL    Intellectual Performance WDL WDL                   Abuse/Neglect    No documentation.                  Legal    No documentation.                  Substance Abuse    No documentation.                  Patient Forms    No documentation.                     Luz Mehta RN

## 2024-08-06 NOTE — PROCEDURES
Incision & Drainage    Date/Time: 8/6/2024 1:49 AM    Performed by: Ruben Mcmillan APRN  Authorized by: Ruben Mcmillan APRN  Consent: Verbal consent obtained.  Risks and benefits: risks, benefits and alternatives were discussed  Consent given by: patient  Patient understanding: patient states understanding of the procedure being performed  Patient consent: the patient's understanding of the procedure matches consent given  Procedure consent: procedure consent matches procedure scheduled  Relevant documents: relevant documents present and verified  Test results: test results available and properly labeled  Site marked: the operative site was marked  Imaging studies: imaging studies not available  Patient identity confirmed: verbally with patient and provided demographic data  Type: cyst  Body area: trunk  Location details: back  Anesthesia: local infiltration    Anesthesia:  Local Anesthetic: lidocaine 1% without epinephrine  Anesthetic total: 4 mL    Sedation:  Patient sedated: no    Scalpel size: 10  Needle gauge: 25.  Incision type: single straight  Drainage: serosanguinous  Drainage amount: moderate  Wound treatment: wound left open  Packing material: none

## 2024-08-06 NOTE — PROGRESS NOTES
"[facility] Clinical Pharmacy Services: Vancomycin Monitoring Note    Shayy Ellison is a 43 y.o. male who is on day 1/3 of pharmacy to dose vancomycin.     Previous Vancomycin Dose:    1500 mg IV every  12  hours  Updated Cultures and Sensitivities: 8/5 MRSA nares: positive; 8/5 1-1 blood culture: ngtd      Vitals/Labs  Ht: 175.3 cm (69\"); Wt: 80 kg (176 lb 6.4 oz)   Temp Readings from Last 1 Encounters:   08/06/24 98.4 °F (36.9 °C) (Oral)     Estimated Creatinine Clearance: 165 mL/min (A) (by C-G formula based on SCr of 0.66 mg/dL (L)).     Results from last 7 days   Lab Units 08/06/24  1037 08/05/24  1709   CREATININE mg/dL 0.66* 0.77   WBC 10*3/mm3 15.48* 13.56*     Assessment/Plan    Current Vancomycin Dose: 1500 mg IV every  12  hours; provides a predicted  mg/L.hr   Next Level Date and Time: Vanc Trough ordered on 8/7 at 1030  BMP in AM ordered  We will continue to monitor patient changes and renal function     Thank you for involving pharmacy in this patient's care. Please contact pharmacy with any questions or concerns.       Julianna Greer, Pharmacy Intern          "

## 2024-08-07 ENCOUNTER — ANESTHESIA EVENT (OUTPATIENT)
Dept: PERIOP | Facility: HOSPITAL | Age: 44
End: 2024-08-07

## 2024-08-07 ENCOUNTER — ANESTHESIA (OUTPATIENT)
Dept: PERIOP | Facility: HOSPITAL | Age: 44
End: 2024-08-07

## 2024-08-07 ENCOUNTER — APPOINTMENT (OUTPATIENT)
Dept: GENERAL RADIOLOGY | Facility: HOSPITAL | Age: 44
DRG: 854 | End: 2024-08-07

## 2024-08-07 LAB
ALBUMIN SERPL-MCNC: 3.4 G/DL (ref 3.5–5.2)
ALBUMIN/GLOB SERPL: 0.9 G/DL
ALP SERPL-CCNC: 92 U/L (ref 39–117)
ALT SERPL W P-5'-P-CCNC: 6 U/L (ref 1–41)
ANION GAP SERPL CALCULATED.3IONS-SCNC: 21.9 MMOL/L (ref 5–15)
ANION GAP SERPL CALCULATED.3IONS-SCNC: 21.9 MMOL/L (ref 5–15)
ANION GAP SERPL CALCULATED.3IONS-SCNC: 23.2 MMOL/L (ref 5–15)
AST SERPL-CCNC: 11 U/L (ref 1–40)
B-OH-BUTYR SERPL-SCNC: 3.73 MMOL/L (ref 0.02–0.27)
BASOPHILS # BLD AUTO: 0.08 10*3/MM3 (ref 0–0.2)
BASOPHILS NFR BLD AUTO: 0.4 % (ref 0–1.5)
BILIRUB SERPL-MCNC: 0.7 MG/DL (ref 0–1.2)
BUN SERPL-MCNC: 10 MG/DL (ref 6–20)
BUN SERPL-MCNC: 9 MG/DL (ref 6–20)
BUN SERPL-MCNC: 9 MG/DL (ref 6–20)
BUN/CREAT SERPL: 11.4 (ref 7–25)
BUN/CREAT SERPL: 11.4 (ref 7–25)
BUN/CREAT SERPL: 14.1 (ref 7–25)
CALCIUM SPEC-SCNC: 8.8 MG/DL (ref 8.6–10.5)
CALCIUM SPEC-SCNC: 9.1 MG/DL (ref 8.6–10.5)
CALCIUM SPEC-SCNC: 9.1 MG/DL (ref 8.6–10.5)
CHLORIDE SERPL-SCNC: 97 MMOL/L (ref 98–107)
CHLORIDE SERPL-SCNC: 97 MMOL/L (ref 98–107)
CHLORIDE SERPL-SCNC: 99 MMOL/L (ref 98–107)
CO2 SERPL-SCNC: 11.1 MMOL/L (ref 22–29)
CO2 SERPL-SCNC: 11.1 MMOL/L (ref 22–29)
CO2 SERPL-SCNC: 12.8 MMOL/L (ref 22–29)
CREAT SERPL-MCNC: 0.71 MG/DL (ref 0.76–1.27)
CREAT SERPL-MCNC: 0.79 MG/DL (ref 0.76–1.27)
CREAT SERPL-MCNC: 0.79 MG/DL (ref 0.76–1.27)
D-LACTATE SERPL-SCNC: 0.7 MMOL/L (ref 0.5–2)
DEPRECATED RDW RBC AUTO: 37.1 FL (ref 37–54)
DEPRECATED RDW RBC AUTO: 38.5 FL (ref 37–54)
EGFRCR SERPLBLD CKD-EPI 2021: 113.7 ML/MIN/1.73
EGFRCR SERPLBLD CKD-EPI 2021: 113.7 ML/MIN/1.73
EGFRCR SERPLBLD CKD-EPI 2021: 117.5 ML/MIN/1.73
EOSINOPHIL # BLD AUTO: 0.06 10*3/MM3 (ref 0–0.4)
EOSINOPHIL NFR BLD AUTO: 0.3 % (ref 0.3–6.2)
ERYTHROCYTE [DISTWIDTH] IN BLOOD BY AUTOMATED COUNT: 13.2 % (ref 12.3–15.4)
ERYTHROCYTE [DISTWIDTH] IN BLOOD BY AUTOMATED COUNT: 13.3 % (ref 12.3–15.4)
GLOBULIN UR ELPH-MCNC: 4 GM/DL
GLUCOSE BLDC GLUCOMTR-MCNC: 111 MG/DL (ref 70–130)
GLUCOSE BLDC GLUCOMTR-MCNC: 117 MG/DL (ref 70–130)
GLUCOSE BLDC GLUCOMTR-MCNC: 129 MG/DL (ref 70–130)
GLUCOSE BLDC GLUCOMTR-MCNC: 144 MG/DL (ref 70–130)
GLUCOSE BLDC GLUCOMTR-MCNC: 156 MG/DL (ref 70–130)
GLUCOSE BLDC GLUCOMTR-MCNC: 164 MG/DL (ref 70–130)
GLUCOSE SERPL-MCNC: 121 MG/DL (ref 65–99)
GLUCOSE SERPL-MCNC: 121 MG/DL (ref 65–99)
GLUCOSE SERPL-MCNC: 99 MG/DL (ref 65–99)
HBA1C MFR BLD: 11.8 % (ref 4.8–5.6)
HCT VFR BLD AUTO: 33.2 % (ref 37.5–51)
HCT VFR BLD AUTO: 34.9 % (ref 37.5–51)
HGB BLD-MCNC: 11.1 G/DL (ref 13–17.7)
HGB BLD-MCNC: 11.3 G/DL (ref 13–17.7)
IMM GRANULOCYTES # BLD AUTO: 0.25 10*3/MM3 (ref 0–0.05)
IMM GRANULOCYTES NFR BLD AUTO: 1.1 % (ref 0–0.5)
LYMPHOCYTES # BLD AUTO: 1.69 10*3/MM3 (ref 0.7–3.1)
LYMPHOCYTES NFR BLD AUTO: 7.6 % (ref 19.6–45.3)
MAGNESIUM SERPL-MCNC: 1.6 MG/DL (ref 1.6–2.6)
MAGNESIUM SERPL-MCNC: 1.6 MG/DL (ref 1.6–2.6)
MCH RBC QN AUTO: 26.2 PG (ref 26.6–33)
MCH RBC QN AUTO: 26.6 PG (ref 26.6–33)
MCHC RBC AUTO-ENTMCNC: 32.4 G/DL (ref 31.5–35.7)
MCHC RBC AUTO-ENTMCNC: 33.4 G/DL (ref 31.5–35.7)
MCV RBC AUTO: 79.4 FL (ref 79–97)
MCV RBC AUTO: 80.8 FL (ref 79–97)
MONOCYTES # BLD AUTO: 1.57 10*3/MM3 (ref 0.1–0.9)
MONOCYTES NFR BLD AUTO: 7.1 % (ref 5–12)
NEUTROPHILS NFR BLD AUTO: 18.54 10*3/MM3 (ref 1.7–7)
NEUTROPHILS NFR BLD AUTO: 83.5 % (ref 42.7–76)
NRBC BLD AUTO-RTO: 0 /100 WBC (ref 0–0.2)
OSMOLALITY SERPL: 275 MOSM/KG (ref 275–300)
PHOSPHATE SERPL-MCNC: 4.1 MG/DL (ref 2.5–4.5)
PHOSPHATE SERPL-MCNC: 4.3 MG/DL (ref 2.5–4.5)
PLATELET # BLD AUTO: 439 10*3/MM3 (ref 140–450)
PLATELET # BLD AUTO: 440 10*3/MM3 (ref 140–450)
PMV BLD AUTO: 8.2 FL (ref 6–12)
PMV BLD AUTO: 8.3 FL (ref 6–12)
POTASSIUM SERPL-SCNC: 4.1 MMOL/L (ref 3.5–5.2)
PROT SERPL-MCNC: 7.4 G/DL (ref 6–8.5)
RBC # BLD AUTO: 4.18 10*6/MM3 (ref 4.14–5.8)
RBC # BLD AUTO: 4.32 10*6/MM3 (ref 4.14–5.8)
SODIUM SERPL-SCNC: 130 MMOL/L (ref 136–145)
SODIUM SERPL-SCNC: 130 MMOL/L (ref 136–145)
SODIUM SERPL-SCNC: 135 MMOL/L (ref 136–145)
TROPONIN T SERPL HS-MCNC: 8 NG/L
VANCOMYCIN TROUGH SERPL-MCNC: 5.6 MCG/ML (ref 5–20)
WBC NRBC COR # BLD AUTO: 19.88 10*3/MM3 (ref 3.4–10.8)
WBC NRBC COR # BLD AUTO: 22.19 10*3/MM3 (ref 3.4–10.8)

## 2024-08-07 PROCEDURE — 25810000003 SODIUM CHLORIDE 0.9 % SOLUTION: Performed by: STUDENT IN AN ORGANIZED HEALTH CARE EDUCATION/TRAINING PROGRAM

## 2024-08-07 PROCEDURE — 93005 ELECTROCARDIOGRAM TRACING: CPT | Performed by: INTERNAL MEDICINE

## 2024-08-07 PROCEDURE — 0J9Q0ZZ DRAINAGE OF RIGHT FOOT SUBCUTANEOUS TISSUE AND FASCIA, OPEN APPROACH: ICD-10-PCS | Performed by: PODIATRIST

## 2024-08-07 PROCEDURE — 25010000002 CEFEPIME PER 500 MG: Performed by: PODIATRIST

## 2024-08-07 PROCEDURE — 83036 HEMOGLOBIN GLYCOSYLATED A1C: CPT | Performed by: INTERNAL MEDICINE

## 2024-08-07 PROCEDURE — 83605 ASSAY OF LACTIC ACID: CPT | Performed by: INTERNAL MEDICINE

## 2024-08-07 PROCEDURE — 25010000002 PROPOFOL 500 MG/50ML EMULSION: Performed by: NURSE ANESTHETIST, CERTIFIED REGISTERED

## 2024-08-07 PROCEDURE — 25010000002 PROPOFOL 10 MG/ML EMULSION: Performed by: NURSE ANESTHETIST, CERTIFIED REGISTERED

## 2024-08-07 PROCEDURE — 63710000001 INSULIN LISPRO (HUMAN) PER 5 UNITS: Performed by: PODIATRIST

## 2024-08-07 PROCEDURE — 25010000002 MIDAZOLAM PER 1 MG: Performed by: NURSE ANESTHETIST, CERTIFIED REGISTERED

## 2024-08-07 PROCEDURE — 25010000002 VANCOMYCIN 10 G RECONSTITUTED SOLUTION: Performed by: STUDENT IN AN ORGANIZED HEALTH CARE EDUCATION/TRAINING PROGRAM

## 2024-08-07 PROCEDURE — 25010000002 ONDANSETRON PER 1 MG: Performed by: NURSE PRACTITIONER

## 2024-08-07 PROCEDURE — 25010000002 FENTANYL CITRATE (PF) 50 MCG/ML SOLUTION: Performed by: NURSE ANESTHETIST, CERTIFIED REGISTERED

## 2024-08-07 PROCEDURE — 87075 CULTR BACTERIA EXCEPT BLOOD: CPT | Performed by: PODIATRIST

## 2024-08-07 PROCEDURE — 25810000003 LACTATED RINGERS PER 1000 ML: Performed by: ANESTHESIOLOGY

## 2024-08-07 PROCEDURE — 84484 ASSAY OF TROPONIN QUANT: CPT | Performed by: INTERNAL MEDICINE

## 2024-08-07 PROCEDURE — 87070 CULTURE OTHR SPECIMN AEROBIC: CPT | Performed by: PODIATRIST

## 2024-08-07 PROCEDURE — 87147 CULTURE TYPE IMMUNOLOGIC: CPT | Performed by: PODIATRIST

## 2024-08-07 PROCEDURE — 0QBN0ZX EXCISION OF RIGHT METATARSAL, OPEN APPROACH, DIAGNOSTIC: ICD-10-PCS | Performed by: PODIATRIST

## 2024-08-07 PROCEDURE — 83735 ASSAY OF MAGNESIUM: CPT | Performed by: INTERNAL MEDICINE

## 2024-08-07 PROCEDURE — 88311 DECALCIFY TISSUE: CPT | Performed by: PODIATRIST

## 2024-08-07 PROCEDURE — 84100 ASSAY OF PHOSPHORUS: CPT | Performed by: INTERNAL MEDICINE

## 2024-08-07 PROCEDURE — 25010000002 BUPIVACAINE 0.25 % SOLUTION: Performed by: PODIATRIST

## 2024-08-07 PROCEDURE — 82948 REAGENT STRIP/BLOOD GLUCOSE: CPT

## 2024-08-07 PROCEDURE — 82010 KETONE BODYS QUAN: CPT | Performed by: STUDENT IN AN ORGANIZED HEALTH CARE EDUCATION/TRAINING PROGRAM

## 2024-08-07 PROCEDURE — 87186 SC STD MICRODIL/AGAR DIL: CPT | Performed by: PODIATRIST

## 2024-08-07 PROCEDURE — 83930 ASSAY OF BLOOD OSMOLALITY: CPT | Performed by: INTERNAL MEDICINE

## 2024-08-07 PROCEDURE — 25010000002 HYDROMORPHONE PER 4 MG

## 2024-08-07 PROCEDURE — 25010000002 HYDROMORPHONE PER 4 MG: Performed by: INTERNAL MEDICINE

## 2024-08-07 PROCEDURE — 25010000002 LIDOCAINE 1 % SOLUTION: Performed by: PODIATRIST

## 2024-08-07 PROCEDURE — 87205 SMEAR GRAM STAIN: CPT | Performed by: PODIATRIST

## 2024-08-07 PROCEDURE — 85025 COMPLETE CBC W/AUTO DIFF WBC: CPT | Performed by: INTERNAL MEDICINE

## 2024-08-07 PROCEDURE — 25010000002 HYDROMORPHONE 1 MG/ML SOLUTION: Performed by: NURSE ANESTHETIST, CERTIFIED REGISTERED

## 2024-08-07 PROCEDURE — 25010000002 CEFEPIME PER 500 MG: Performed by: INTERNAL MEDICINE

## 2024-08-07 PROCEDURE — 99232 SBSQ HOSP IP/OBS MODERATE 35: CPT | Performed by: INTERNAL MEDICINE

## 2024-08-07 PROCEDURE — 80053 COMPREHEN METABOLIC PANEL: CPT | Performed by: INTERNAL MEDICINE

## 2024-08-07 PROCEDURE — 88307 TISSUE EXAM BY PATHOLOGIST: CPT | Performed by: PODIATRIST

## 2024-08-07 PROCEDURE — 25810000003 SODIUM CHLORIDE 0.9 % SOLUTION: Performed by: PODIATRIST

## 2024-08-07 PROCEDURE — 85027 COMPLETE CBC AUTOMATED: CPT | Performed by: INTERNAL MEDICINE

## 2024-08-07 PROCEDURE — 80202 ASSAY OF VANCOMYCIN: CPT | Performed by: STUDENT IN AN ORGANIZED HEALTH CARE EDUCATION/TRAINING PROGRAM

## 2024-08-07 PROCEDURE — 25010000002 HYDROMORPHONE PER 4 MG: Performed by: NURSE ANESTHETIST, CERTIFIED REGISTERED

## 2024-08-07 PROCEDURE — 25010000002 VANCOMYCIN 10 G RECONSTITUTED SOLUTION: Performed by: PODIATRIST

## 2024-08-07 PROCEDURE — 93010 ELECTROCARDIOGRAM REPORT: CPT | Performed by: INTERNAL MEDICINE

## 2024-08-07 PROCEDURE — 71045 X-RAY EXAM CHEST 1 VIEW: CPT

## 2024-08-07 PROCEDURE — 25010000002 ONDANSETRON PER 1 MG: Performed by: STUDENT IN AN ORGANIZED HEALTH CARE EDUCATION/TRAINING PROGRAM

## 2024-08-07 PROCEDURE — 25010000002 HYDROMORPHONE 1 MG/ML SOLUTION: Performed by: PODIATRIST

## 2024-08-07 RX ORDER — DEXTROSE MONOHYDRATE 25 G/50ML
10-50 INJECTION, SOLUTION INTRAVENOUS
Status: CANCELLED | OUTPATIENT
Start: 2024-08-07

## 2024-08-07 RX ORDER — SODIUM CHLORIDE 9 MG/ML
40 INJECTION, SOLUTION INTRAVENOUS AS NEEDED
Status: CANCELLED | OUTPATIENT
Start: 2024-08-07

## 2024-08-07 RX ORDER — SODIUM CHLORIDE AND POTASSIUM CHLORIDE 300; 900 MG/100ML; MG/100ML
250 INJECTION, SOLUTION INTRAVENOUS CONTINUOUS PRN
Status: CANCELLED | OUTPATIENT
Start: 2024-08-07

## 2024-08-07 RX ORDER — FAMOTIDINE 10 MG/ML
20 INJECTION, SOLUTION INTRAVENOUS ONCE
Status: COMPLETED | OUTPATIENT
Start: 2024-08-07 | End: 2024-08-07

## 2024-08-07 RX ORDER — NICOTINE POLACRILEX 4 MG
15 LOZENGE BUCCAL
Status: CANCELLED | OUTPATIENT
Start: 2024-08-07

## 2024-08-07 RX ORDER — IBUPROFEN 600 MG/1
1 TABLET ORAL
Status: DISCONTINUED | OUTPATIENT
Start: 2024-08-07 | End: 2024-08-08 | Stop reason: SDUPTHER

## 2024-08-07 RX ORDER — HYDROMORPHONE HYDROCHLORIDE 1 MG/ML
0.5 INJECTION, SOLUTION INTRAMUSCULAR; INTRAVENOUS; SUBCUTANEOUS
Status: DISCONTINUED | OUTPATIENT
Start: 2024-08-07 | End: 2024-08-07 | Stop reason: HOSPADM

## 2024-08-07 RX ORDER — LIDOCAINE HYDROCHLORIDE 20 MG/ML
INJECTION, SOLUTION INFILTRATION; PERINEURAL AS NEEDED
Status: DISCONTINUED | OUTPATIENT
Start: 2024-08-07 | End: 2024-08-07 | Stop reason: SURG

## 2024-08-07 RX ORDER — ONDANSETRON 2 MG/ML
4 INJECTION INTRAMUSCULAR; INTRAVENOUS EVERY 6 HOURS PRN
Status: DISCONTINUED | OUTPATIENT
Start: 2024-08-07 | End: 2024-08-13 | Stop reason: HOSPADM

## 2024-08-07 RX ORDER — FENTANYL CITRATE 50 UG/ML
50 INJECTION, SOLUTION INTRAMUSCULAR; INTRAVENOUS ONCE AS NEEDED
Status: DISCONTINUED | OUTPATIENT
Start: 2024-08-07 | End: 2024-08-07 | Stop reason: HOSPADM

## 2024-08-07 RX ORDER — IPRATROPIUM BROMIDE AND ALBUTEROL SULFATE 2.5; .5 MG/3ML; MG/3ML
3 SOLUTION RESPIRATORY (INHALATION) ONCE AS NEEDED
Status: DISCONTINUED | OUTPATIENT
Start: 2024-08-07 | End: 2024-08-07 | Stop reason: HOSPADM

## 2024-08-07 RX ORDER — PROCHLORPERAZINE EDISYLATE 5 MG/ML
5 INJECTION INTRAMUSCULAR; INTRAVENOUS EVERY 6 HOURS PRN
Status: DISCONTINUED | OUTPATIENT
Start: 2024-08-07 | End: 2024-08-13 | Stop reason: HOSPADM

## 2024-08-07 RX ORDER — SODIUM CHLORIDE 0.9 % (FLUSH) 0.9 %
10 SYRINGE (ML) INJECTION EVERY 12 HOURS SCHEDULED
Status: CANCELLED | OUTPATIENT
Start: 2024-08-07

## 2024-08-07 RX ORDER — SODIUM CHLORIDE AND POTASSIUM CHLORIDE 150; 450 MG/100ML; MG/100ML
250 INJECTION, SOLUTION INTRAVENOUS CONTINUOUS PRN
Status: DISCONTINUED | OUTPATIENT
Start: 2024-08-07 | End: 2024-08-08

## 2024-08-07 RX ORDER — DEXTROSE MONOHYDRATE, SODIUM CHLORIDE, AND POTASSIUM CHLORIDE 50; 1.49; 4.5 G/1000ML; G/1000ML; G/1000ML
150 INJECTION, SOLUTION INTRAVENOUS CONTINUOUS PRN
Status: DISCONTINUED | OUTPATIENT
Start: 2024-08-07 | End: 2024-08-08

## 2024-08-07 RX ORDER — DROPERIDOL 2.5 MG/ML
0.62 INJECTION, SOLUTION INTRAMUSCULAR; INTRAVENOUS
Status: DISCONTINUED | OUTPATIENT
Start: 2024-08-07 | End: 2024-08-07 | Stop reason: HOSPADM

## 2024-08-07 RX ORDER — LIDOCAINE HYDROCHLORIDE 10 MG/ML
0.5 INJECTION, SOLUTION INFILTRATION; PERINEURAL ONCE AS NEEDED
Status: DISCONTINUED | OUTPATIENT
Start: 2024-08-07 | End: 2024-08-07 | Stop reason: HOSPADM

## 2024-08-07 RX ORDER — OXYCODONE HYDROCHLORIDE 5 MG/1
5 TABLET ORAL EVERY 4 HOURS PRN
Status: DISPENSED | OUTPATIENT
Start: 2024-08-07 | End: 2024-08-12

## 2024-08-07 RX ORDER — LIDOCAINE HYDROCHLORIDE 10 MG/ML
INJECTION, SOLUTION INFILTRATION; PERINEURAL AS NEEDED
Status: DISCONTINUED | OUTPATIENT
Start: 2024-08-07 | End: 2024-08-07 | Stop reason: HOSPADM

## 2024-08-07 RX ORDER — ONDANSETRON 2 MG/ML
4 INJECTION INTRAMUSCULAR; INTRAVENOUS ONCE AS NEEDED
Status: DISCONTINUED | OUTPATIENT
Start: 2024-08-07 | End: 2024-08-07 | Stop reason: HOSPADM

## 2024-08-07 RX ORDER — DEXTROSE MONOHYDRATE, SODIUM CHLORIDE, AND POTASSIUM CHLORIDE 50; 2.98; 4.5 G/1000ML; G/1000ML; G/1000ML
150 INJECTION, SOLUTION INTRAVENOUS CONTINUOUS PRN
Status: DISCONTINUED | OUTPATIENT
Start: 2024-08-07 | End: 2024-08-08

## 2024-08-07 RX ORDER — PROMETHAZINE HYDROCHLORIDE 25 MG/1
25 SUPPOSITORY RECTAL ONCE AS NEEDED
Status: DISCONTINUED | OUTPATIENT
Start: 2024-08-07 | End: 2024-08-07 | Stop reason: HOSPADM

## 2024-08-07 RX ORDER — LABETALOL HYDROCHLORIDE 5 MG/ML
5 INJECTION, SOLUTION INTRAVENOUS
Status: DISCONTINUED | OUTPATIENT
Start: 2024-08-07 | End: 2024-08-07 | Stop reason: HOSPADM

## 2024-08-07 RX ORDER — SODIUM CHLORIDE 450 MG/100ML
250 INJECTION, SOLUTION INTRAVENOUS CONTINUOUS PRN
Status: CANCELLED | OUTPATIENT
Start: 2024-08-07

## 2024-08-07 RX ORDER — DEXTROSE MONOHYDRATE, SODIUM CHLORIDE, AND POTASSIUM CHLORIDE 50; 2.98; 4.5 G/1000ML; G/1000ML; G/1000ML
150 INJECTION, SOLUTION INTRAVENOUS CONTINUOUS PRN
Status: CANCELLED | OUTPATIENT
Start: 2024-08-07

## 2024-08-07 RX ORDER — SODIUM CHLORIDE AND POTASSIUM CHLORIDE 150; 900 MG/100ML; MG/100ML
250 INJECTION, SOLUTION INTRAVENOUS CONTINUOUS PRN
Status: CANCELLED | OUTPATIENT
Start: 2024-08-07

## 2024-08-07 RX ORDER — EPHEDRINE SULFATE 50 MG/ML
5 INJECTION, SOLUTION INTRAVENOUS ONCE AS NEEDED
Status: DISCONTINUED | OUTPATIENT
Start: 2024-08-07 | End: 2024-08-07 | Stop reason: HOSPADM

## 2024-08-07 RX ORDER — DEXTROSE MONOHYDRATE, SODIUM CHLORIDE, AND POTASSIUM CHLORIDE 50; 1.49; 9 G/1000ML; G/1000ML; G/1000ML
150 INJECTION, SOLUTION INTRAVENOUS CONTINUOUS PRN
Status: CANCELLED | OUTPATIENT
Start: 2024-08-07

## 2024-08-07 RX ORDER — MIDAZOLAM HYDROCHLORIDE 1 MG/ML
INJECTION INTRAMUSCULAR; INTRAVENOUS AS NEEDED
Status: DISCONTINUED | OUTPATIENT
Start: 2024-08-07 | End: 2024-08-07 | Stop reason: SURG

## 2024-08-07 RX ORDER — DEXTROSE MONOHYDRATE, SODIUM CHLORIDE, AND POTASSIUM CHLORIDE 50; 2.98; 9 G/1000ML; G/1000ML; G/1000ML
150 INJECTION, SOLUTION INTRAVENOUS CONTINUOUS PRN
Status: CANCELLED | OUTPATIENT
Start: 2024-08-07

## 2024-08-07 RX ORDER — SODIUM CHLORIDE, SODIUM LACTATE, POTASSIUM CHLORIDE, CALCIUM CHLORIDE 600; 310; 30; 20 MG/100ML; MG/100ML; MG/100ML; MG/100ML
9 INJECTION, SOLUTION INTRAVENOUS CONTINUOUS
Status: DISCONTINUED | OUTPATIENT
Start: 2024-08-07 | End: 2024-08-13 | Stop reason: HOSPADM

## 2024-08-07 RX ORDER — SODIUM CHLORIDE AND POTASSIUM CHLORIDE 150; 450 MG/100ML; MG/100ML
250 INJECTION, SOLUTION INTRAVENOUS CONTINUOUS PRN
Status: CANCELLED | OUTPATIENT
Start: 2024-08-07

## 2024-08-07 RX ORDER — DEXTROSE MONOHYDRATE AND SODIUM CHLORIDE 5; .45 G/100ML; G/100ML
150 INJECTION, SOLUTION INTRAVENOUS CONTINUOUS PRN
Status: CANCELLED | OUTPATIENT
Start: 2024-08-07

## 2024-08-07 RX ORDER — PROPOFOL 10 MG/ML
INJECTION, EMULSION INTRAVENOUS AS NEEDED
Status: DISCONTINUED | OUTPATIENT
Start: 2024-08-07 | End: 2024-08-07 | Stop reason: SURG

## 2024-08-07 RX ORDER — HYDROMORPHONE HYDROCHLORIDE 1 MG/ML
0.5 INJECTION, SOLUTION INTRAMUSCULAR; INTRAVENOUS; SUBCUTANEOUS
Status: DISCONTINUED | OUTPATIENT
Start: 2024-08-07 | End: 2024-08-12

## 2024-08-07 RX ORDER — PROPOFOL 10 MG/ML
VIAL (ML) INTRAVENOUS CONTINUOUS PRN
Status: DISCONTINUED | OUTPATIENT
Start: 2024-08-07 | End: 2024-08-07 | Stop reason: SURG

## 2024-08-07 RX ORDER — ONDANSETRON 2 MG/ML
4 INJECTION INTRAMUSCULAR; INTRAVENOUS ONCE
Status: COMPLETED | OUTPATIENT
Start: 2024-08-07 | End: 2024-08-07

## 2024-08-07 RX ORDER — OXYCODONE AND ACETAMINOPHEN 7.5; 325 MG/1; MG/1
1 TABLET ORAL EVERY 4 HOURS PRN
Status: DISCONTINUED | OUTPATIENT
Start: 2024-08-07 | End: 2024-08-07 | Stop reason: HOSPADM

## 2024-08-07 RX ORDER — SODIUM CHLORIDE 450 MG/100ML
250 INJECTION, SOLUTION INTRAVENOUS CONTINUOUS PRN
Status: DISCONTINUED | OUTPATIENT
Start: 2024-08-07 | End: 2024-08-08

## 2024-08-07 RX ORDER — SODIUM CHLORIDE 0.9 % (FLUSH) 0.9 %
10 SYRINGE (ML) INJECTION AS NEEDED
Status: DISCONTINUED | OUTPATIENT
Start: 2024-08-07 | End: 2024-08-08

## 2024-08-07 RX ORDER — SODIUM CHLORIDE AND POTASSIUM CHLORIDE 300; 900 MG/100ML; MG/100ML
250 INJECTION, SOLUTION INTRAVENOUS CONTINUOUS PRN
Status: DISCONTINUED | OUTPATIENT
Start: 2024-08-07 | End: 2024-08-08

## 2024-08-07 RX ORDER — VANCOMYCIN/0.9 % SOD CHLORIDE 1.5G/250ML
1500 PLASTIC BAG, INJECTION (ML) INTRAVENOUS EVERY 12 HOURS
Status: DISCONTINUED | OUTPATIENT
Start: 2024-08-07 | End: 2024-08-07

## 2024-08-07 RX ORDER — FENTANYL CITRATE 50 UG/ML
50 INJECTION, SOLUTION INTRAMUSCULAR; INTRAVENOUS
Status: DISCONTINUED | OUTPATIENT
Start: 2024-08-07 | End: 2024-08-07 | Stop reason: HOSPADM

## 2024-08-07 RX ORDER — SODIUM CHLORIDE 9 MG/ML
40 INJECTION, SOLUTION INTRAVENOUS AS NEEDED
Status: DISCONTINUED | OUTPATIENT
Start: 2024-08-07 | End: 2024-08-08

## 2024-08-07 RX ORDER — HYDRALAZINE HYDROCHLORIDE 20 MG/ML
5 INJECTION INTRAMUSCULAR; INTRAVENOUS
Status: DISCONTINUED | OUTPATIENT
Start: 2024-08-07 | End: 2024-08-07 | Stop reason: HOSPADM

## 2024-08-07 RX ORDER — PROMETHAZINE HYDROCHLORIDE 25 MG/1
25 TABLET ORAL ONCE AS NEEDED
Status: DISCONTINUED | OUTPATIENT
Start: 2024-08-07 | End: 2024-08-07 | Stop reason: HOSPADM

## 2024-08-07 RX ORDER — DIPHENHYDRAMINE HYDROCHLORIDE 50 MG/ML
12.5 INJECTION INTRAMUSCULAR; INTRAVENOUS
Status: DISCONTINUED | OUTPATIENT
Start: 2024-08-07 | End: 2024-08-07 | Stop reason: HOSPADM

## 2024-08-07 RX ORDER — SODIUM CHLORIDE 9 MG/ML
250 INJECTION, SOLUTION INTRAVENOUS CONTINUOUS PRN
Status: CANCELLED | OUTPATIENT
Start: 2024-08-07

## 2024-08-07 RX ORDER — HYDROCODONE BITARTRATE AND ACETAMINOPHEN 5; 325 MG/1; MG/1
1 TABLET ORAL ONCE AS NEEDED
Status: DISCONTINUED | OUTPATIENT
Start: 2024-08-07 | End: 2024-08-07 | Stop reason: HOSPADM

## 2024-08-07 RX ORDER — DEXTROSE MONOHYDRATE, SODIUM CHLORIDE, AND POTASSIUM CHLORIDE 50; 1.49; 4.5 G/1000ML; G/1000ML; G/1000ML
150 INJECTION, SOLUTION INTRAVENOUS CONTINUOUS PRN
Status: CANCELLED | OUTPATIENT
Start: 2024-08-07

## 2024-08-07 RX ORDER — FENTANYL CITRATE 50 UG/ML
INJECTION, SOLUTION INTRAMUSCULAR; INTRAVENOUS AS NEEDED
Status: DISCONTINUED | OUTPATIENT
Start: 2024-08-07 | End: 2024-08-07 | Stop reason: SURG

## 2024-08-07 RX ORDER — NICOTINE POLACRILEX 4 MG
15 LOZENGE BUCCAL
Status: DISCONTINUED | OUTPATIENT
Start: 2024-08-07 | End: 2024-08-08 | Stop reason: SDUPTHER

## 2024-08-07 RX ORDER — DEXTROSE MONOHYDRATE, SODIUM CHLORIDE, AND POTASSIUM CHLORIDE 50; 2.98; 9 G/1000ML; G/1000ML; G/1000ML
150 INJECTION, SOLUTION INTRAVENOUS CONTINUOUS PRN
Status: DISCONTINUED | OUTPATIENT
Start: 2024-08-07 | End: 2024-08-08

## 2024-08-07 RX ORDER — DEXTROSE MONOHYDRATE, SODIUM CHLORIDE, AND POTASSIUM CHLORIDE 50; 1.49; 9 G/1000ML; G/1000ML; G/1000ML
150 INJECTION, SOLUTION INTRAVENOUS CONTINUOUS PRN
Status: DISCONTINUED | OUTPATIENT
Start: 2024-08-07 | End: 2024-08-08

## 2024-08-07 RX ORDER — MAGNESIUM HYDROXIDE 1200 MG/15ML
LIQUID ORAL AS NEEDED
Status: DISCONTINUED | OUTPATIENT
Start: 2024-08-07 | End: 2024-08-07 | Stop reason: HOSPADM

## 2024-08-07 RX ORDER — IBUPROFEN 600 MG/1
1 TABLET ORAL
Status: CANCELLED | OUTPATIENT
Start: 2024-08-07

## 2024-08-07 RX ORDER — DEXTROSE MONOHYDRATE AND SODIUM CHLORIDE 5; .9 G/100ML; G/100ML
150 INJECTION, SOLUTION INTRAVENOUS CONTINUOUS PRN
Status: DISCONTINUED | OUTPATIENT
Start: 2024-08-07 | End: 2024-08-08

## 2024-08-07 RX ORDER — HYDROMORPHONE HYDROCHLORIDE 2 MG/ML
2 INJECTION, SOLUTION INTRAMUSCULAR; INTRAVENOUS; SUBCUTANEOUS ONCE
Status: COMPLETED | OUTPATIENT
Start: 2024-08-07 | End: 2024-08-07

## 2024-08-07 RX ORDER — SODIUM CHLORIDE 0.9 % (FLUSH) 0.9 %
3 SYRINGE (ML) INJECTION EVERY 12 HOURS SCHEDULED
Status: DISCONTINUED | OUTPATIENT
Start: 2024-08-07 | End: 2024-08-07 | Stop reason: HOSPADM

## 2024-08-07 RX ORDER — SODIUM CHLORIDE 9 MG/ML
250 INJECTION, SOLUTION INTRAVENOUS CONTINUOUS PRN
Status: DISCONTINUED | OUTPATIENT
Start: 2024-08-07 | End: 2024-08-08

## 2024-08-07 RX ORDER — SODIUM CHLORIDE 0.9 % (FLUSH) 0.9 %
10 SYRINGE (ML) INJECTION AS NEEDED
Status: CANCELLED | OUTPATIENT
Start: 2024-08-07

## 2024-08-07 RX ORDER — SODIUM CHLORIDE 0.9 % (FLUSH) 0.9 %
10 SYRINGE (ML) INJECTION EVERY 12 HOURS SCHEDULED
Status: DISCONTINUED | OUTPATIENT
Start: 2024-08-07 | End: 2024-08-08

## 2024-08-07 RX ORDER — DEXTROSE MONOHYDRATE AND SODIUM CHLORIDE 5; .45 G/100ML; G/100ML
150 INJECTION, SOLUTION INTRAVENOUS CONTINUOUS PRN
Status: DISCONTINUED | OUTPATIENT
Start: 2024-08-07 | End: 2024-08-08

## 2024-08-07 RX ORDER — NALOXONE HCL 0.4 MG/ML
0.2 VIAL (ML) INJECTION AS NEEDED
Status: DISCONTINUED | OUTPATIENT
Start: 2024-08-07 | End: 2024-08-07 | Stop reason: HOSPADM

## 2024-08-07 RX ORDER — MIDAZOLAM HYDROCHLORIDE 1 MG/ML
1 INJECTION INTRAMUSCULAR; INTRAVENOUS
Status: DISCONTINUED | OUTPATIENT
Start: 2024-08-07 | End: 2024-08-07 | Stop reason: HOSPADM

## 2024-08-07 RX ORDER — DEXTROSE MONOHYDRATE 25 G/50ML
10-50 INJECTION, SOLUTION INTRAVENOUS
Status: DISCONTINUED | OUTPATIENT
Start: 2024-08-07 | End: 2024-08-08

## 2024-08-07 RX ORDER — FLUMAZENIL 0.1 MG/ML
0.2 INJECTION INTRAVENOUS AS NEEDED
Status: DISCONTINUED | OUTPATIENT
Start: 2024-08-07 | End: 2024-08-07 | Stop reason: HOSPADM

## 2024-08-07 RX ORDER — BUPIVACAINE HYDROCHLORIDE 2.5 MG/ML
INJECTION, SOLUTION INFILTRATION; PERINEURAL AS NEEDED
Status: DISCONTINUED | OUTPATIENT
Start: 2024-08-07 | End: 2024-08-07 | Stop reason: HOSPADM

## 2024-08-07 RX ORDER — DEXTROSE MONOHYDRATE AND SODIUM CHLORIDE 5; .9 G/100ML; G/100ML
150 INJECTION, SOLUTION INTRAVENOUS CONTINUOUS PRN
Status: CANCELLED | OUTPATIENT
Start: 2024-08-07

## 2024-08-07 RX ORDER — SODIUM CHLORIDE AND POTASSIUM CHLORIDE 150; 900 MG/100ML; MG/100ML
250 INJECTION, SOLUTION INTRAVENOUS CONTINUOUS PRN
Status: DISCONTINUED | OUTPATIENT
Start: 2024-08-07 | End: 2024-08-08

## 2024-08-07 RX ORDER — SODIUM CHLORIDE 0.9 % (FLUSH) 0.9 %
3-10 SYRINGE (ML) INJECTION AS NEEDED
Status: DISCONTINUED | OUTPATIENT
Start: 2024-08-07 | End: 2024-08-07 | Stop reason: HOSPADM

## 2024-08-07 RX ADMIN — HYDROMORPHONE HYDROCHLORIDE 2 MG: 2 INJECTION, SOLUTION INTRAMUSCULAR; INTRAVENOUS; SUBCUTANEOUS at 18:45

## 2024-08-07 RX ADMIN — CEFEPIME 2000 MG: 2 INJECTION, POWDER, FOR SOLUTION INTRAVENOUS at 03:37

## 2024-08-07 RX ADMIN — MIDAZOLAM 2 MG: 1 INJECTION INTRAMUSCULAR; INTRAVENOUS at 07:36

## 2024-08-07 RX ADMIN — FENTANYL CITRATE 50 MCG: 50 INJECTION, SOLUTION INTRAMUSCULAR; INTRAVENOUS at 07:36

## 2024-08-07 RX ADMIN — PROPOFOL 50 MG: 10 INJECTION, EMULSION INTRAVENOUS at 07:58

## 2024-08-07 RX ADMIN — FAMOTIDINE 20 MG: 10 INJECTION INTRAVENOUS at 07:26

## 2024-08-07 RX ADMIN — FENTANYL CITRATE 50 MCG: 50 INJECTION, SOLUTION INTRAMUSCULAR; INTRAVENOUS at 07:57

## 2024-08-07 RX ADMIN — PROPOFOL 100 MG: 10 INJECTION, EMULSION INTRAVENOUS at 07:43

## 2024-08-07 RX ADMIN — HYDROMORPHONE HYDROCHLORIDE 1 MG: 1 INJECTION, SOLUTION INTRAMUSCULAR; INTRAVENOUS; SUBCUTANEOUS at 22:53

## 2024-08-07 RX ADMIN — HYDROMORPHONE HYDROCHLORIDE 0.5 MG: 1 INJECTION, SOLUTION INTRAMUSCULAR; INTRAVENOUS; SUBCUTANEOUS at 03:35

## 2024-08-07 RX ADMIN — MIDAZOLAM 2 MG: 1 INJECTION INTRAMUSCULAR; INTRAVENOUS at 07:57

## 2024-08-07 RX ADMIN — FENTANYL CITRATE 50 MCG: 50 INJECTION, SOLUTION INTRAMUSCULAR; INTRAVENOUS at 08:40

## 2024-08-07 RX ADMIN — CEFEPIME 2000 MG: 2 INJECTION, POWDER, FOR SOLUTION INTRAVENOUS at 12:28

## 2024-08-07 RX ADMIN — INSULIN HUMAN 0.6 UNITS/HR: 1 INJECTION, SOLUTION INTRAVENOUS at 21:38

## 2024-08-07 RX ADMIN — ONDANSETRON 4 MG: 2 INJECTION INTRAMUSCULAR; INTRAVENOUS at 21:38

## 2024-08-07 RX ADMIN — HYDROMORPHONE HYDROCHLORIDE 0.5 MG: 1 INJECTION, SOLUTION INTRAMUSCULAR; INTRAVENOUS; SUBCUTANEOUS at 09:33

## 2024-08-07 RX ADMIN — PROPOFOL 50 MG: 10 INJECTION, EMULSION INTRAVENOUS at 07:53

## 2024-08-07 RX ADMIN — CEFEPIME 2000 MG: 2 INJECTION, POWDER, FOR SOLUTION INTRAVENOUS at 21:36

## 2024-08-07 RX ADMIN — HYDROMORPHONE HYDROCHLORIDE 0.5 MG: 1 INJECTION, SOLUTION INTRAMUSCULAR; INTRAVENOUS; SUBCUTANEOUS at 10:02

## 2024-08-07 RX ADMIN — HYDROMORPHONE HYDROCHLORIDE 1 MG: 1 INJECTION, SOLUTION INTRAMUSCULAR; INTRAVENOUS; SUBCUTANEOUS at 11:29

## 2024-08-07 RX ADMIN — POTASSIUM CHLORIDE, DEXTROSE MONOHYDRATE AND SODIUM CHLORIDE 150 ML/HR: 150; 5; 450 INJECTION, SOLUTION INTRAVENOUS at 21:36

## 2024-08-07 RX ADMIN — FENTANYL CITRATE 50 MCG: 50 INJECTION, SOLUTION INTRAMUSCULAR; INTRAVENOUS at 08:50

## 2024-08-07 RX ADMIN — LIDOCAINE HYDROCHLORIDE 5 ML: 20 INJECTION, SOLUTION INFILTRATION; PERINEURAL at 07:43

## 2024-08-07 RX ADMIN — HYDROMORPHONE HYDROCHLORIDE 0.5 MG: 1 INJECTION, SOLUTION INTRAMUSCULAR; INTRAVENOUS; SUBCUTANEOUS at 08:35

## 2024-08-07 RX ADMIN — PROPOFOL 120 MCG/KG/MIN: 10 INJECTION, EMULSION INTRAVENOUS at 07:43

## 2024-08-07 RX ADMIN — INSULIN LISPRO 5 UNITS: 100 INJECTION, SOLUTION INTRAVENOUS; SUBCUTANEOUS at 12:28

## 2024-08-07 RX ADMIN — FENTANYL CITRATE 50 MCG: 50 INJECTION, SOLUTION INTRAMUSCULAR; INTRAVENOUS at 09:47

## 2024-08-07 RX ADMIN — PROPOFOL 140 MCG/KG/MIN: 10 INJECTION, EMULSION INTRAVENOUS at 07:53

## 2024-08-07 RX ADMIN — Medication 10 ML: at 22:48

## 2024-08-07 RX ADMIN — HYDROMORPHONE HYDROCHLORIDE 0.5 MG: 1 INJECTION, SOLUTION INTRAMUSCULAR; INTRAVENOUS; SUBCUTANEOUS at 08:46

## 2024-08-07 RX ADMIN — OXYCODONE HYDROCHLORIDE 5 MG: 5 TABLET ORAL at 17:39

## 2024-08-07 RX ADMIN — OXYCODONE HYDROCHLORIDE 5 MG: 5 TABLET ORAL at 12:29

## 2024-08-07 RX ADMIN — HYDROMORPHONE HYDROCHLORIDE 1 MG: 1 INJECTION, SOLUTION INTRAMUSCULAR; INTRAVENOUS; SUBCUTANEOUS at 14:48

## 2024-08-07 RX ADMIN — ONDANSETRON 4 MG: 2 INJECTION INTRAMUSCULAR; INTRAVENOUS at 05:36

## 2024-08-07 RX ADMIN — VANCOMYCIN HYDROCHLORIDE 1750 MG: 10 INJECTION, POWDER, LYOPHILIZED, FOR SOLUTION INTRAVENOUS at 17:39

## 2024-08-07 RX ADMIN — INSULIN LISPRO 2 UNITS: 100 INJECTION, SOLUTION INTRAVENOUS; SUBCUTANEOUS at 12:29

## 2024-08-07 RX ADMIN — ONDANSETRON 4 MG: 2 INJECTION INTRAMUSCULAR; INTRAVENOUS at 15:32

## 2024-08-07 RX ADMIN — HYDROMORPHONE HYDROCHLORIDE 0.5 MG: 1 INJECTION, SOLUTION INTRAMUSCULAR; INTRAVENOUS; SUBCUTANEOUS at 05:32

## 2024-08-07 RX ADMIN — SODIUM CHLORIDE, POTASSIUM CHLORIDE, SODIUM LACTATE AND CALCIUM CHLORIDE 9 ML/HR: 600; 310; 30; 20 INJECTION, SOLUTION INTRAVENOUS at 07:25

## 2024-08-07 NOTE — ANESTHESIA POSTPROCEDURE EVALUATION
Patient: Shayy Ekic    Procedure Summary       Date: 08/07/24 Room / Location: [facility] OR [address] [place] [address] MAIN OR    Anesthesia Start: 0734 Anesthesia Stop: 0830    Procedures:       Right foot incision and drainage (Right: Ankle)      Right fifth metatarsal bone biopsy (Right: Ankle) Diagnosis:       Cellulitis of right foot      Abscess of right foot      (Cellulitis of right foot [L03.115])      (Abscess of right foot [L02.611])    Surgeons: Alexander Freeman DPM Provider: Vaishali Goldman MD    Anesthesia Type: MAC ASA Status: 3            Anesthesia Type: MAC    Vitals  Vitals Value Taken Time   /86 08/07/24 0915   Temp 36.5 °C (97.7 °F) 08/07/24 0825   Pulse 106 08/07/24 0916   Resp 20 08/07/24 0915   SpO2 95 % 08/07/24 0916   Vitals shown include unfiled device data.        Anesthesia Post Evaluation

## 2024-08-07 NOTE — OP NOTE
Operative Report    Patient: Shayy Ellison     Date: 08/07/24     MRN: [id number]       SURGEON: Alexander Freeman DPM     ASSISTANT: None    PROCEDURE: Right foot incision and drainage down to bone with fifth metatarsal bone biopsy    PRE-OPERATIVE DIAGNOSIS: Right foot abscess and cellulitis, possible osteomyelitis    POST-OPERATIVE DIAGNOSIS: Same    ANAESTHESIA: MAC with local    HEMOSTASIS: Right calf tourniquet 250 mmHg    ESTIMATED BLOOD LOSS: 5 cc    SPECIMEN: Right fifth metatarsal bone biopsy to pathology and right foot abscess swab culture    IMPLANTS: None    COMPLICATIONS: None    INDICATION FOR PROCEDURE: 43-year-old diabetic male presented to [facility] [place] emergency room with increased redness pain and swelling to the right foot.  He states he had a scratch on the foot a number of weeks ago and the area has subsequently gotten worse.  MRI showed abscess of the right foot.    Consent was obtained pre-operatively. All questions were answered, risks versus benefits were discussed at length. No guarantees or assurances were given or implied.    PROCEDURE: Patient was brought to the operating room and placed on the operative table in supine position. A surgical timeout was performed and then patients name, date of birth, procedure and surgical site were all verified. A local block of 20 cc of a one-to-one mix of 1% lidocaine plain and 0.5% Marcaine plain was injected to the surgical site. A pneumatic calf tourniquet was applied to the right lower extremity right. The right lower extremity was then scrubbed, prepped and draped in the usual sterile manner.     Attention was directed to the right plantar lateral midfoot area at the base of the.  Fifth metatarsal area stab incision was made in this area there is noted to be purulence expressed.  Hemostat was then used to find areas of tracking.  There was 1 substantial deep area tracking in the plantar aspect of the foot proximal toward the lateral heel.  Incision was  made deep to open this area.  An additional area of tracking was noted up to the lateral midfoot area.  In similar fashion hemostat was used and this area was incised.  All nonviable tissue was resected.  The abductor digiti minimi muscle belly showed significant signs of necrosis.  This needed to be excised.  There was significant purulence from both of those areas.  All bleeders were ligated and cauterized as necessary.  There is noted to be tracking to the fifth metatarsal base area and I was able to probe the bone.  It was deemed necessary to take a sample of the bone for pathology for evaluation of osteomyelitis.  A Best Bid bone biopsy needle was used to do this.  The open wound to the right foot was copiously irrigated with sterile saline solution.  At this point it was noted that all nonviable tissue had been resected.  The wound was packed with Betadine soaked gauze and dressed with sterile dressing consisting of 4 x 4's ABD pad Kerlix and Ace wrap.  The tourniquet was deflated and prompt hyperemic response was noted to all digits of the right foot.  The patient was transferred from the operating room to the recovery room with vital signs stable vascular status intact to the right foot.    **This is a planned staged procedure, will plan for delayed wound closure if infection is resolved.    Alexander Freeman DPM

## 2024-08-07 NOTE — ADDENDUM NOTE
Addendum  created 08/07/24 1527 by Vaishali Goldman MD    Attestation recorded in Intraprocedure, Intraprocedure Attestations filed

## 2024-08-07 NOTE — PLAN OF CARE
Goal Outcome Evaluation:    Patient is A & O X 4. On RA and continuous pulse oximetry. VSS. Medicated per MAR. NPO since midnight. Consent in chart for: right foot incision and drainage, right fifth  metatarsal bone biopsy. PRN Dilaudid given X 4 for R foot pain. PRN zofran given X 1. Voiding to urinal. ACHS. CHG completed. Bed low, locked, alarm on and call light within reach.     0539: Patient off the floor to pre-op.

## 2024-08-07 NOTE — ANESTHESIA PREPROCEDURE EVALUATION
Anesthesia Evaluation     NPO Solid Status: > 8 hours  NPO Liquid Status: > 8 hours           Airway   Mallampati: III  Neck ROM: full  No difficulty expected  Dental - normal exam     Pulmonary    (+) asthma,  Cardiovascular         Neuro/Psych  GI/Hepatic/Renal/Endo    (+) diabetes mellitus using insulin    Musculoskeletal     Abdominal    Substance History      OB/GYN          Other                    Anesthesia Plan    ASA 3     MAC     intravenous induction     Anesthetic plan, risks, benefits, and alternatives have been provided, discussed and informed consent has been obtained with: patient.    CODE STATUS:    Level Of Support Discussed With: Patient  Code Status (Patient has no pulse and is not breathing): CPR (Attempt to Resuscitate)  Medical Interventions (Patient has pulse or is breathing): Full Support

## 2024-08-07 NOTE — NURSING NOTE
"Pt agitated upon arrival to ICU, thus delaying treatment time, hanging insulin and fluids per protocol. This RN had to explain CXR and EKG and talk pt through it d/t anxiety and frustration about becoming an uncle today and he is in hospital, not having anything to eat and being moved to ICU. Pt will also not allow for this RN to place another IV for DKA treatment d/t increased pain in R foot and \"too much going on and I've been stuck too much\". This RN paged Dr. Santo who allowed extra 2mg dilaudid. This RN discussed with pt pre extra pain medication dose we are here to help him and the pt has to allow us to help him in the way of drawing blood and starting IV and starting IV medications, in order for him to get better. He explained he gets uncooperative when stressed and this RN encouraged pt to recognize that and attempt to control his emotions. Pt verbalized understanding and that he will allow us to draw blood and start another IV for insulin and fluids per DKA protocol in 10min after IV pain meds given. CTM   "

## 2024-08-07 NOTE — PROGRESS NOTES
Infectious diseases progress note     chief complaint: Follow-up deep right foot infection    Subjective: Patient seen in PACU following foot debridement.  He is sleepy.  He denies foot pain.  He is tolerating Vanco and cefepime without rash or diarrhea.      Medications:    Current Facility-Administered Medications:     [Transfer Hold] acetaminophen (TYLENOL) tablet 1,000 mg, 1,000 mg, Oral, Q8H PRN, Ruben Mcmillan APRN, 1,000 mg at 08/06/24 1646    [Transfer Hold] sennosides-docusate (PERICOLACE) 8.6-50 MG per tablet 2 tablet, 2 tablet, Oral, BID PRN **AND** [Transfer Hold] polyethylene glycol (MIRALAX) packet 17 g, 17 g, Oral, Daily PRN **AND** [Transfer Hold] bisacodyl (DULCOLAX) EC tablet 5 mg, 5 mg, Oral, Daily PRN **AND** [Transfer Hold] bisacodyl (DULCOLAX) suppository 10 mg, 10 mg, Rectal, Daily PRN, Ruben Mcmillan APRN    [Transfer Hold] Calcium Replacement - Follow Nurse / BPA Driven Protocol, , Does not apply, PRN, Ruben Mcmillan APRN    cefepime 2000 mg IVPB in 100 mL NS (MBP), 2,000 mg, Intravenous, Q8H, Rashid Saenz MD, 2,000 mg at 08/07/24 0337    [Transfer Hold] dextrose (D50W) (25 g/50 mL) IV injection 25 g, 25 g, Intravenous, Q15 Min PRN, Ruben Mcmillan APRN    [Transfer Hold] dextrose (GLUTOSE) oral gel 15 g, 15 g, Oral, Q15 Min PRN, Ruben Mcmillan APRN    diphenhydrAMINE (BENADRYL) injection 12.5 mg, 12.5 mg, Intravenous, Q15 Min PRN, Deepak Sommers CRNA    droperidol (INAPSINE) injection 0.625 mg, 0.625 mg, Intravenous, Q20 Min PRN **OR** droperidol (INAPSINE) injection 0.625 mg, 0.625 mg, Intramuscular, Q20 Min PRN, Deepak Sommers CRNA    ePHEDrine injection 5 mg, 5 mg, Intravenous, Once PRN, Deepak Sommers CRNA    fentaNYL citrate (PF) (SUBLIMAZE) injection 50 mcg, 50 mcg, Intravenous, Q5 Min PRN, Deepak Sommers CRNA, 50 mcg at 08/07/24 0947    flumazenil (ROMAZICON) injection 0.2 mg, 0.2 mg, Intravenous, PRN, Deepak Sommers CRNA    [Transfer Hold] glucagon (GLUCAGEN) injection 1 mg, 1  mg, Intramuscular, Q15 Min PRN, Ruben Mcmillan APRN    hydrALAZINE (APRESOLINE) injection 5 mg, 5 mg, Intravenous, Q10 Min PRN, Deepak Sommers CRNA    HYDROcodone-acetaminophen (NORCO) 5-325 MG per tablet 1 tablet, 1 tablet, Oral, Once PRN, Deepak Sommers CRNA    [Transfer Hold] HYDROmorphone (DILAUDID) injection 0.5 mg, 0.5 mg, Intravenous, Q2H PRN, Ruben Mcmillan APRN, 0.5 mg at 08/07/24 0532    HYDROmorphone (DILAUDID) injection 0.5 mg, 0.5 mg, Intravenous, Q5 Min PRN, Deepak Sommers CRNA, 0.5 mg at 08/07/24 0933    [Transfer Hold] ibuprofen (ADVIL,MOTRIN) tablet 600 mg, 600 mg, Oral, Q6H PRN, Ruben Mcmillan APRN, 600 mg at 08/06/24 1223    [Transfer Hold] insulin glargine (LANTUS, SEMGLEE) injection 25 Units, 25 Units, Subcutaneous, Nightly, Andrea Pandya MD    [Transfer Hold] insulin lispro (HUMALOG/ADMELOG) injection 2-9 Units, 2-9 Units, Subcutaneous, 4x Daily AC & at Bedtime, Ruben Mcmillan APRN, 4 Units at 08/06/24 1223    [Transfer Hold] insulin lispro (HUMALOG/ADMELOG) injection 5 Units, 5 Units, Subcutaneous, TID With Meals, Andrea Pandya MD, 5 Units at 08/06/24 1737    ipratropium-albuterol (DUO-NEB) nebulizer solution 3 mL, 3 mL, Nebulization, Once PRN, Deepak Sommers CRNA    labetalol (NORMODYNE,TRANDATE) injection 5 mg, 5 mg, Intravenous, Q5 Min PRN, Deepak Sommers CRNA    lactated ringers infusion, 9 mL/hr, Intravenous, Continuous, Vaishali Goldman MD, Last Rate: 9 mL/hr at 08/07/24 0725, Restarted at 08/07/24 0734    [Transfer Hold] Magnesium Standard Dose Replacement - Follow Nurse / BPA Driven Protocol, , Does not apply, PRN, Ruben Mcmillan, ZULY    naloxone (NARCAN) injection 0.2 mg, 0.2 mg, Intravenous, PRN, Deepak Sommers CRNA    [Transfer Hold] nicotine (NICODERM CQ) 21 MG/24HR patch 1 patch, 1 patch, Transdermal, Q24H, Andrea Pandya MD, 1 patch at 08/06/24 6167    ondansetron (ZOFRAN) injection 4 mg, 4 mg, Intravenous, Once PRN, Deepak Sommers CRNA    oxyCODONE-acetaminophen (PERCOCET) 7.5-325 MG per  tablet 1 tablet, 1 tablet, Oral, Q4H PRN, Deepak Sommers CRNA    Pharmacy to dose vancomycin, , Does not apply, Continuous PRN, Ruben Mcmillan APRN    [Transfer Hold] Phosphorus Replacement - Follow Nurse / BPA Driven Protocol, , Does not apply, PRN, Ruben Mcmillan APRN    Potassium Replacement - Follow Nurse / BPA Driven Protocol, , Does not apply, PRN, Ruben Mcmillan APRN    promethazine (PHENERGAN) suppository 25 mg, 25 mg, Rectal, Once PRN **OR** promethazine (PHENERGAN) tablet 25 mg, 25 mg, Oral, Once PRN, Deepak Sommers CRNA    [Transfer Hold] sertraline (ZOLOFT) tablet 50 mg, 50 mg, Oral, Daily, Ruben Mcmillan APRN, 50 mg at 08/06/24 0949    [COMPLETED] Insert Peripheral IV, , , Once **AND** [Transfer Hold] sodium chloride 0.9 % flush 10 mL, 10 mL, Intravenous, PRN, Ruben Mcmillan APRN    [Transfer Hold] sodium chloride 0.9 % flush 10 mL, 10 mL, Intravenous, Q12H, Ruben Mcmillan APRN, 10 mL at 08/06/24 2112    [Transfer Hold] sodium chloride 0.9 % flush 10 mL, 10 mL, Intravenous, PRN, Ruben Mcmillan APRN    [Transfer Hold] sodium chloride 0.9 % infusion 40 mL, 40 mL, Intravenous, PRN, Ruben Mcmillan APRN    vancomycin IVPB 1500 mg in 0.9% NaCl (Premix) 500 mL, 1,500 mg, Intravenous, Q12H, Ruben Mcmillan APRN, Last Rate: 333.3 mL/hr at 08/06/24 2323, 1,500 mg at 08/06/24 2323      Objective   Vital Signs   Temp:  [97.2 °F (36.2 °C)-99.4 °F (37.4 °C)] 97.7 °F (36.5 °C)  Heart Rate:  [] 111  Resp:  [14-20] 20  BP: (122-149)/() 149/70    Physical Exam:   General: Sleepy in PACU  Eyes: no scleral icterus  Cardiovascular: Tachycardic  Respiratory: normal work of breathing without wheezing  GI: Abdomen is soft, not tender  Skin: Right foot bandaged  Vasc: PIV w/o erythema    Labs:   CBC, BMP, blood cultures, and wound cultures reviewed today  Lab Results   Component Value Date    WBC 19.88 (H) 08/07/2024    HGB 11.3 (L) 08/07/2024    HCT 34.9 (L) 08/07/2024    MCV 80.8 08/07/2024     08/07/2024     Lab Results  "  Component Value Date    GLUCOSE 99 08/07/2024    CALCIUM 8.8 08/07/2024     (L) 08/07/2024    K 4.1 08/07/2024    CO2 12.8 (L) 08/07/2024    CL 99 08/07/2024    BUN 10 08/07/2024    CREATININE 0.71 (L) 08/07/2024    EGFRRESULT 111.6 12/07/2023    EGFR 117.5 08/07/2024    BCR 14.1 08/07/2024    ANIONGAP 23.2 (H) 08/07/2024     Lab Results   Component Value Date    CRP 23.66 (H) 08/05/2024     Lab Results   Component Value Date    HGBA1C 12.50 (H) 08/06/2024     No results found for: \"VANCOPEAK\", \"VANCOTROUGH\", \"VANCORANDOM\"    Microbiology:  8/5 BCx: Negative to date  8/5 MRSA nares PCR: Positive  8/7 right foot operative cultures: Pending    Prior radiology:  MRI R Foot:   \"1. Plantar-lateral anterior hindfoot, midfoot, proximal forefoot   nonenhancing collection and presumed infected collection/abscess   predominantly centered within the abductor digiting minimi muscle and   extends from the level of the central to anterior calcaneus distally to   the coronal level of the junction of the mid-third and distal-third of   the 5th metatarsal. The collection contacts the plantar aspect of the   peroneus longus tendon where there is presumed infectious tenosynovitis   and there is extensive surrounding myositis. Generalized foot myositis   and diffuse cellulitis.   2. Bone marrow edema and enhancement within the base of the 5th   metatarsal and to a lesser degree within the plantar aspect of the   cuboid due to reactive marrow edema or early osteomyelitis.   3. 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   4. Suspect peroneus brevis split tear at the proximal margin of the   field-of-view of this exam. \"    XR R Foot:   \"No acute fracture, erosion, or dislocation is identified. If there is   further clinical concern, MRI or bone scan could be obtained for further   evaluation. A sclerotic focus in the second proximal phalanx, although   nonspecific, may represent bone " "island.\"    ASSESSMENT/PLAN:  Cellulitis, abscess, tenosynovitis, myositis, and probable early osteomyelitis of the right foot  Uncontrolled type 1 diabetes -last A1c 12.5%  MRSA carrier  Hyponatremia  Elevated C-reactive protein    On 8/7/2024, he went to the operating room for foot debridement with podiatry.  I reviewed the operative note and the plan tracked all the way down to bone so I am going to treat him as acute osteomyelitis with a 4-week course of antibiotics.    While awaiting operative cultures, continue empiric vancomycin and cefepime.    While the patient is on vancomycin, vancomycin levels will be ordered and reviewed with dose adjustments made as needed. The patient will have a daily creatinine level checked while on vancomycin as part of monitoring this medication.     Check CRP with a.m. labs for postoperative baseline.    Going forward, stricter glucose control will be of the utmost importance to promote healing and decrease risk of future infection.    ID will follow.     "

## 2024-08-07 NOTE — PLAN OF CARE
Pt. Slightly tachycardic when in pain, VS otherwise wnl. C/o persistent pain, hard to control when first arrived to unit, pain control became much better throughout the day with IV and PO pain meds and patient was resting/asleep majority of the day. This afternoon MD notified this RN that pt. Could possibly be in DKA, STAT labs drawn, betahydroxy critical, result called to INGR[facility] DUBOSE, pt. Transferred to ICU for further treatment. This RN notified pt's wife of transfer who states she is on her way. Pt. Was upset about being transferred to ICU and stated it will make his foot hurt too bad. He finally agreed to go after this RN gave him PO pain meds and had him s/w his wife before he made his decision. Pt. Receiving IV abx. Pt. A&O x4. Pt. With ice and elevation to RLE throughout the day, dressing CDI, palpable PT pulse in RLE and cap refill < 3 secs, RLE color and temperature WNL. Report called to Haresh WARD. Pt. Transferred to ICU via this RN and transporter with transport monitor.    Problem: Adult Inpatient Plan of Care  Goal: Plan of Care Review  Outcome: Ongoing, Progressing  Flowsheets (Taken 8/7/2024 1944)  Progress: no change  Plan of Care Reviewed With: patient

## 2024-08-07 NOTE — PROGRESS NOTES
[facility] Clinical Pharmacy Services: Vancomycin Level Monitoring Note    Shayy Ellison is a 43 y.o. male who is on day 2/3of pharmacy to dose vancomycin for sepsis.    Estimated Creatinine Clearance: 154.5 mL/min (A) (by C-G formula based on SCr of 0.71 mg/dL (L)).    Current Vanc Dose:   Results from last 7 days   Lab Units 08/07/24  1345   VANCOMYCIN TR mcg/mL 5.60     Flagged 1245 dose in [facility] to remove. Verified not given   Vancomycin increased to 1750 q12 for . 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    Yumiko Rodgers [facility]  Clinical Pharmacist

## 2024-08-07 NOTE — CONSULTS
Referring Provider: Dr. Pandya  Reason for Consultation: DKA    Patient Care Team:  Provider, No Known as PCP - General    Chief complaint:   Not feeling well    History of present illness:    Subjective   This is a 43-year-old male patient with history of DM type II who was admitted over here on 8/5/2024 for right foot pain and was diagnosed with cellulitis and abscess.  He has been receiving antibiotic therapy.  He is also receiving insulin therapy.  Today, he underwent right foot incision and drainage and fifth metatarsal bone biopsy.    Procedure was uneventful.  Patient did not receive his Lantus last night as he was n.p.o. for the surgery today.    On the morning labs he was noted to have serum bicarbonate of 12 and anion gap of 23.  Beta hydroxybutyrate was checked and was 3.7.  His WBC is 20K.    We were called to evaluate him for DKA.    Patient reported a history of DM type II diagnosed in 2010.  He said that he was on Metformin but he stopped that.  He said that he was taking it intermittently due to issues with insurance.  He believes that he had type 2 diabetes.  He said that he was overweight at that time.  He has not on insulin therapy.    He is currently feeling terrible and reported headache, body aches and nausea.  Never had a history of DKA in the past.      Review of Systems  Constitutional: No fever or chills.   ENMT: No sinus congestion  Cardiovascular: No chest pain, palpitation or legs swelling.    Respiratory: No dyspnea, cough or wheezing.  Gastrointestinal: No constipation, diarrhea or abdominal pain   Neurology: No headache, weakness, numbness or dizziness.   Musculoskeletal: No joints pain, stiffness or swelling.   Psychiatry: No depression.  Genitourinary: No dysuria or frequent urination  Endo: No weight changes. No cold or warm intolerance.  Lymphatic: No swollen glands.  Integumentary: No rash.    History  Past Medical History:   Diagnosis Date    Asthma     Diabetes     ,   Past Surgical History:   Procedure Laterality Date    ANKLE SURGERY Left 2005   ,   Family History   Problem Relation Age of Onset    Hypertension Mother     Diabetes Mother     Hypertension Father     Diabetes Father     Lung cancer Father     No Known Problems Brother     Malraheel Hyperthermia Neg Hx    ,   Social History     Socioeconomic History    Marital status: Single   Tobacco Use    Smoking status: Every Day     Current packs/day: 1.50     Average packs/day: 1.5 packs/day for 15.0 years (22.5 ttl pk-yrs)     Types: Cigarettes     Passive exposure: Never    Smokeless tobacco: Never   Vaping Use    Vaping status: Never Used   Substance and Sexual Activity    Alcohol use: Not Currently    Drug use: Never     E-cigarette/Vaping    E-cigarette/Vaping Use Never User      E-cigarette/Vaping Substances     E-cigarette/Vaping Devices         ,   Medications Prior to Admission   Medication Sig Dispense Refill Last Dose    Continuous Blood Gluc  (FreeStyle Rochelle 14 Day Story) device Use 1 each Take As Directed. 1 each 0 8/4/2024   , Scheduled Meds:  cefepime, 2,000 mg, Intravenous, Q8H  insulin glargine, 25 Units, Subcutaneous, Nightly  insulin lispro, 2-9 Units, Subcutaneous, 4x Daily AC & at Bedtime  insulin lispro, 5 Units, Subcutaneous, TID With Meals  nicotine, 1 patch, Transdermal, Q24H  sertraline, 50 mg, Oral, Daily  sodium chloride, 10 mL, Intravenous, Q12H  vancomycin, 1,750 mg, Intravenous, Q12H   , Continuous Infusions:  lactated ringers, 9 mL/hr, Last Rate: 9 mL/hr (08/07/24 7494)  Pharmacy to dose vancomycin,    , PRN Meds:    acetaminophen    senna-docusate sodium **AND** polyethylene glycol **AND** bisacodyl **AND** bisacodyl    Calcium Replacement - Follow Nurse / BPA Driven Protocol    dextrose    dextrose    glucagon (human recombinant)    HYDROmorphone    ibuprofen    Magnesium Standard Dose Replacement - Follow Nurse / BPA Driven Protocol    ondansetron    oxyCODONE    Pharmacy to dose  vancomycin    Phosphorus Replacement - Follow Nurse / BPA Driven Protocol    Potassium Replacement - Follow Nurse / BPA Driven Protocol    [COMPLETED] Insert Peripheral IV **AND** sodium chloride    sodium chloride    sodium chloride, and Allergies:  Pork-derived products and Latex    Objective     Vital Signs   Temp:  [97.2 °F (36.2 °C)-99.5 °F (37.5 °C)] 99.5 °F (37.5 °C)  Heart Rate:  [] 114  Resp:  [14-20] 16  BP: (125-156)/() 128/76    PPE used per hospital policy    Physical Exam:  Constitutional: Not in acute distress.  Eyes: Injected conjunctivae, EOMI. pupils equal reactive to light.  ENMT: Arnold 3. No oral thrush.  dry tongue.  Neck: Trachea midline. No thyromegaly  Heart: Tachycardic but regular rhythm.  No audible murmur  Lungs: Good and equal air entry bilaterally.  Non labored breathing.   Abdomen:. Soft. No tenderness or dullness. No HSM.  Extremities: No cyanosis, clubbing or pitting edema.  Warm extremities and well-perfused.  Neuro: Conscious, alert, oriented x3.  Strength 5/5 in arms.  Psych: Appropriate mood and affect.    Integumentary: No rash.  Normal skin turgor  Lymphatic: No palpable cervical or supraclavicular lymph nodes.      Diagnostic imaging:  I personally and independently reviewed the following images:   X-ray right foot 8/5/24: Sclerotic focus of the second proximal phalanx.    Laboratory workup:    Results from last 7 days   Lab Units 08/07/24  0500 08/06/24  1037 08/05/24  1709   SODIUM mmol/L 135* 131* 133*   POTASSIUM mmol/L 4.1 3.9 4.1   CHLORIDE mmol/L 99 98 98   CO2 mmol/L 12.8* 24.0 22.0   BUN mg/dL 10 10 14   CREATININE mg/dL 0.71* 0.66* 0.77   GLUCOSE mg/dL 99 258* 297*   CALCIUM mg/dL 8.8 8.7 9.2         Results from last 7 days   Lab Units 08/07/24  0500 08/06/24  1037 08/05/24  1709   WBC 10*3/mm3 19.88* 15.48* 13.56*   HEMOGLOBIN g/dL 11.3* 11.4* 12.1*   HEMATOCRIT % 34.9* 35.4* 38.6   PLATELETS 10*3/mm3 439 413 450               Assessment      DKA  Right foot cellulitis/abscess s/p I&D  Sepsis, secondary to #2  Abnormal imaging of the right metatarsal s/p right fifth metatarsal bone biopsy  Anemia  Tobacco abuse    Recommendations:      Transfer to ICU.  Check lactic acid for completeness  Initiate insulin drip per DKA protocol  IV hydration he will require D 1/2 NS due to blood sugar being borderline  Antibiotics with Vanco and cefepime.  Follow-up culture results  DVT prophylaxis: Initiate Lovenox.  Zofran as needed  Pain management with Tylenol and morphine as needed      Vin Perry MD  08/07/24  17:22 EDT    Time: Critical care 40 min

## 2024-08-07 NOTE — PROGRESS NOTES
Name: Shayy Ellison ADMIT: 2024   : 1980  PCP: Provider, No Known    MRN: [id number] LOS: 1 days   AGE/SEX: 43 y.o. male  ROOM: [facility]     Subjective   Underwent surgery today.  Anion gap is elevated..  Lethargic right now.    Objective   Objective   Vital Signs  Temp:  [97.2 °F (36.2 °C)-99.4 °F (37.4 °C)] 98.8 °F (37.1 °C)  Heart Rate:  [] 111  Resp:  [14-20] 18  BP: (122-156)/() 147/82  SpO2:  [94 %-98 %] 98 %  on   ;   Device (Oxygen Therapy): room air  Body mass index is 26.32 kg/m².  Physical Exam  Constitutional:       General: He is not in acute distress.  HENT:      Head: Normocephalic and atraumatic.   Eyes:      Extraocular Movements: Extraocular movements intact.      Pupils: Pupils are equal, round, and reactive to light.   Cardiovascular:      Rate and Rhythm: Normal rate and regular rhythm.   Pulmonary:      Effort: Pulmonary effort is normal. No respiratory distress.   Abdominal:      General: There is no distension.      Palpations: Abdomen is soft.      Tenderness: There is no abdominal tenderness.   Musculoskeletal:         General: No swelling. Normal range of motion.   Skin:     General: Skin is warm and dry.   Neurological:      Mental Status: He is alert.      Cranial Nerves: Cranial nerve deficit present.      Comments: lethargic           Results Review     I reviewed the patient's new clinical results.  Results from last 7 days   Lab Units 24  0500 24  1037 24  1709   WBC 10*3/mm3 19.88* 15.48* 13.56*   HEMOGLOBIN g/dL 11.3* 11.4* 12.1*   PLATELETS 10*3/mm3 439 413 450     Results from last 7 days   Lab Units 24  0500 24  1037 24  1709   SODIUM mmol/L 135* 131* 133*   POTASSIUM mmol/L 4.1 3.9 4.1   CHLORIDE mmol/L 99 98 98   CO2 mmol/L 12.8* 24.0 22.0   BUN mg/dL 10 10 14   CREATININE mg/dL 0.71* 0.66* 0.77   GLUCOSE mg/dL 99 258* 297*   Estimated Creatinine Clearance: 154.5 mL/min (A) (by C-G formula based on SCr of 0.71 mg/dL  "(L)).  Results from last 7 days   Lab Units 08/05/24  1709   ALBUMIN g/dL 3.6   BILIRUBIN mg/dL 0.5   ALK PHOS U/L 86   AST (SGOT) U/L 6   ALT (SGPT) U/L 7     Results from last 7 days   Lab Units 08/07/24  0500 08/06/24  1037 08/05/24  1709   CALCIUM mg/dL 8.8 8.7 9.2   ALBUMIN g/dL  --   --  3.6     Results from last 7 days   Lab Units 08/05/24  1709   LACTATE mmol/L 1.4   No results found for: \"COVID19\"  Hemoglobin A1C   Date/Time Value Ref Range Status   08/06/2024 1037 12.50 (H) 4.80 - 5.60 % Final     Glucose   Date/Time Value Ref Range Status   08/07/2024 1121 164 (H) 70 - 130 mg/dL Final   08/07/2024 0843 111 70 - 130 mg/dL Final   08/07/2024 0455 156 (H) 70 - 130 mg/dL Final   08/06/2024 2042 82 70 - 130 mg/dL Final   08/06/2024 1704 125 70 - 130 mg/dL Final   08/06/2024 1121 214 (H) 70 - 130 mg/dL Final   08/06/2024 0756 235 (H) 70 - 130 mg/dL Final     Results for orders placed or performed during the hospital encounter of 08/05/24   Blood Culture - Blood, Arm, Left    Specimen: Arm, Left; Blood   Result Value Ref Range    Blood Culture No growth at 24 hours          MRI Foot Right With & Without Contrast  Narrative: MRI RIGHT MIDFOOT AND PROXIMAL FOREFOOT WITH AND WITHOUT CONTRAST     HISTORY: Twisted right foot and stepped on plastic causing superficial  abrasion. Swelling and redness in the right foot.     TECHNIQUE:  MRI includes coronal, short axis T1, T1 fat-sat, T2 fat-sat,  axial T1, STIR, sagittal PD fat-saturated sequences.  Contrast was  administered IV followed by axial, coronal, sagittal T1 fat-saturated  sequences.     COMPARISON: Right foot x-rays 08/05/2024.     FINDINGS: A gel capsule was placed at the site of clinical interest and  this gelcap was dorsal to the distal 4th metatarsal. A 2nd gel capsule  was placed lateral to the central to anterior calcaneus. There is  generalized edema and swelling in subcutaneous fat as well as  enhancement that is nonspecific though is consistent with " cellulitis.  There is also generalized muscular edema consistent with myositis.     Within the distal aspect of the lateral cuneiform there is a focal area  of T1 hypointense and STIR hyperintense signal that is attributed to  arthritic changes at the 3rd TMT joint. There is also bone marrow edema  and enhancement within the base of the 5th metatarsal. There is a  nonenhancing fluid collection at the lateral anterior hindfoot, midfoot,  and proximal forefoot. The proximal extent of the visualized collection  is within the abductor digiting minimi muscle and flexor digiti minimi  muscles and the collection extends plantar to the peroneus longus tendon  adjacent to the plantar cuboid. Collection extends distally within the  abductor digiting minimi muscle to the coronal level of the joints in  the mid-third and distal-third of the 5th metatarsal. The AP extension  of this nonenhancing collection and presumed infected collection/abscess  is approximately 9.2 cm. Collection measures up to 3 cm transverse  dimension by 2.5 cm in height. The distal 1st through 4th proximal  phalanges and the tip of the 5th distal phalanx are excluded from the  field-of-view.     There is suspected peroneus brevis split tear at the proximal margin of  the field-of-view of this exam.     Impression: 1. Plantar-lateral anterior hindfoot, midfoot, proximal forefoot  nonenhancing collection and presumed infected collection/abscess  predominantly centered within the abductor digiting minimi muscle and  extends from the level of the central to anterior calcaneus distally to  the coronal level of the junction of the mid-third and distal-third of  the 5th metatarsal. The collection contacts the plantar aspect of the  peroneus longus tendon where there is presumed infectious tenosynovitis  and there is extensive surrounding myositis. Generalized foot myositis  and diffuse cellulitis.   2. Bone marrow edema and enhancement within the base of the  5th  metatarsal and to a lesser degree within the plantar aspect of the  cuboid due to reactive marrow edema or early osteomyelitis.  3. 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  4. Suspect peroneus brevis split tear at the proximal margin of the  field-of-view of this exam.     This report was finalized on 8/6/2024 12:34 PM by Dr. Catalino Lopez M.D on Workstation: [id number]       Scheduled Medications  cefepime, 2,000 mg, Intravenous, Q8H  insulin glargine, 25 Units, Subcutaneous, Nightly  insulin lispro, 2-9 Units, Subcutaneous, 4x Daily AC & at Bedtime  insulin lispro, 5 Units, Subcutaneous, TID With Meals  nicotine, 1 patch, Transdermal, Q24H  sertraline, 50 mg, Oral, Daily  sodium chloride, 10 mL, Intravenous, Q12H  vancomycin, 1,500 mg, Intravenous, Q12H    Infusions  lactated ringers, 9 mL/hr, Last Rate: 9 mL/hr (08/07/24 0725)  Pharmacy to dose vancomycin,     Diet  Diet: Regular/House, Diabetic; Consistent Carbohydrate; Fluid Consistency: Thin (IDDSI 0)       Assessment/Plan     Active Hospital Problems    Diagnosis  POA    **Soft tissue infection [L08.9]  Yes    Sepsis [A41.9]  Unknown    Diabetic foot infection [E11.628, L08.9]  Yes    Cellulitis of right foot [L03.115]  Unknown    Abscess of right foot [L02.611]  Unknown      Resolved Hospital Problems   No resolved problems to display.       43 y.o. male admitted with Soft tissue infection.    Diabetic ketoacidosis  Beta-hydroxybutyrate is elevated.  Anion gap is as high as 23  Transferring to ICU for insulin ggt     Osteomyelitis  Right foot abscess  On vancomycin and cefepime.  Status post I&D today along with biopsy of right fifth metatarsal.    Discussed with Dr. Vicky Pandya MD  [facility]  08/07/24  15:19 EDT

## 2024-08-07 NOTE — H&P
[facility]   PREOPERATIVE HISTORY AND PHYSICAL    Patient Name:Shayy Ellison  : 1980  MRN: [id number]  Primary Care Physician: Provider, No Known  Date of admission: 2024    Subjective   Subjective     Chief Complaint: preoperative evaluation    Foot Pain      Shayy Ellison is a 43 y.o. male who presents for preoperative evaluation. He is scheduled for Right foot incision and drainage (Right), Right fifth metatarsal bone biopsy (Right).    Review of Systems     Personal History     Past Medical History:   Diagnosis Date    Asthma     Diabetes        Past Surgical History:   Procedure Laterality Date    ANKLE SURGERY Left        Family History: His family history includes Diabetes in his father and mother; Hypertension in his father and mother; Lung cancer in his father; No Known Problems in his brother.     Social History: He  reports that he has been smoking cigarettes. He has a 22.5 pack-year smoking history. He has never been exposed to tobacco smoke. He has never used smokeless tobacco. He reports that he does not currently use alcohol. He reports that he does not use drugs.    Home Medications:  FreeStyle Rochelle 14 Day [place]itt    Allergies:  He is allergic to pork-derived products and latex.    Objective    Objective     Vitals:    Temp:  [97.2 °F (36.2 °C)-99.4 °F (37.4 °C)] 99.4 °F (37.4 °C)  Heart Rate:  [] 106  Resp:  [16-18] 18  BP: (122-139)/(78-87) 133/86    Physical Exam    Dermatology: No open lesions noted to the right foot.  There is significant erythema edema to the right lateral midfoot that does extend plantarly towards the medial aspect of the foot.  No proximal streaking past the ankle.     Vascular: DP and PT pulses are palpable     Neurological: Light touch and protective sensation are diminished to the right foot     Musckuloskeletal: 5 out of 5 muscle strength all compartments, pain to palpation of the lateral forefoot.    Assessment & Plan   Assessment / Plan     Brief  Patient Summary:  Shayy Ellison is a 43 y.o. male who presents for preoperative evaluation.    Pre-Op Diagnosis Codes:     * Cellulitis of right foot [L03.115]     * Abscess of right foot [L02.611]    Active Hospital Problems:  Active Hospital Problems    Diagnosis     **Soft tissue infection     Sepsis     Diabetic foot infection     Cellulitis of right foot     Abscess of right foot      Plan:   Procedure(s):  Right foot incision and drainage  Right fifth metatarsal bone biopsy    The risks, benefits, and alternatives of the procedure including but not limited to bleeding, infection, wound healing issues, need for more surgery and loss of limb and risks of the anesthesia were discussed in detail with the patient and questions were answered. No guarantees were made or implied. Informed consent was obtained.    Alexander Freeman DPM

## 2024-08-08 LAB
ALBUMIN SERPL-MCNC: 3 G/DL (ref 3.5–5.2)
ALBUMIN/GLOB SERPL: 0.8 G/DL
ALP SERPL-CCNC: 79 U/L (ref 39–117)
ALT SERPL W P-5'-P-CCNC: 7 U/L (ref 1–41)
ANION GAP SERPL CALCULATED.3IONS-SCNC: 11.6 MMOL/L (ref 5–15)
ANION GAP SERPL CALCULATED.3IONS-SCNC: 14 MMOL/L (ref 5–15)
ANION GAP SERPL CALCULATED.3IONS-SCNC: 14.7 MMOL/L (ref 5–15)
ANION GAP SERPL CALCULATED.3IONS-SCNC: 14.7 MMOL/L (ref 5–15)
ANION GAP SERPL CALCULATED.3IONS-SCNC: 15.8 MMOL/L (ref 5–15)
ANION GAP SERPL CALCULATED.3IONS-SCNC: 16.9 MMOL/L (ref 5–15)
ANION GAP SERPL CALCULATED.3IONS-SCNC: 18.7 MMOL/L (ref 5–15)
AST SERPL-CCNC: 6 U/L (ref 1–40)
ATMOSPHERIC PRESS: 744.3 MMHG
BASE EXCESS BLDV CALC-SCNC: -4.5 MMOL/L (ref -2–2)
BILIRUB SERPL-MCNC: 0.6 MG/DL (ref 0–1.2)
BUN SERPL-MCNC: 7 MG/DL (ref 6–20)
BUN SERPL-MCNC: 7 MG/DL (ref 6–20)
BUN SERPL-MCNC: 8 MG/DL (ref 6–20)
BUN SERPL-MCNC: 8 MG/DL (ref 6–20)
BUN SERPL-MCNC: 9 MG/DL (ref 6–20)
BUN/CREAT SERPL: 11 (ref 7–25)
BUN/CREAT SERPL: 11.1 (ref 7–25)
BUN/CREAT SERPL: 11.1 (ref 7–25)
BUN/CREAT SERPL: 11.3 (ref 7–25)
BUN/CREAT SERPL: 11.8 (ref 7–25)
BUN/CREAT SERPL: 11.8 (ref 7–25)
BUN/CREAT SERPL: 11.9 (ref 7–25)
CALCIUM SPEC-SCNC: 7.7 MG/DL (ref 8.6–10.5)
CALCIUM SPEC-SCNC: 8.5 MG/DL (ref 8.6–10.5)
CALCIUM SPEC-SCNC: 8.7 MG/DL (ref 8.6–10.5)
CALCIUM SPEC-SCNC: 8.9 MG/DL (ref 8.6–10.5)
CALCIUM SPEC-SCNC: 9 MG/DL (ref 8.6–10.5)
CHLORIDE SERPL-SCNC: 101 MMOL/L (ref 98–107)
CHLORIDE SERPL-SCNC: 104 MMOL/L (ref 98–107)
CHLORIDE SERPL-SCNC: 108 MMOL/L (ref 98–107)
CHLORIDE SERPL-SCNC: 98 MMOL/L (ref 98–107)
CHLORIDE SERPL-SCNC: 99 MMOL/L (ref 98–107)
CO2 BLDA-SCNC: 19 MMOL/L (ref 23–27)
CO2 SERPL-SCNC: 14.2 MMOL/L (ref 22–29)
CO2 SERPL-SCNC: 15.3 MMOL/L (ref 22–29)
CO2 SERPL-SCNC: 15.4 MMOL/L (ref 22–29)
CO2 SERPL-SCNC: 16.1 MMOL/L (ref 22–29)
CO2 SERPL-SCNC: 17 MMOL/L (ref 22–29)
CREAT SERPL-MCNC: 0.59 MG/DL (ref 0.76–1.27)
CREAT SERPL-MCNC: 0.62 MG/DL (ref 0.76–1.27)
CREAT SERPL-MCNC: 0.72 MG/DL (ref 0.76–1.27)
CREAT SERPL-MCNC: 0.72 MG/DL (ref 0.76–1.27)
CREAT SERPL-MCNC: 0.76 MG/DL (ref 0.76–1.27)
CREAT SERPL-MCNC: 0.76 MG/DL (ref 0.76–1.27)
CREAT SERPL-MCNC: 0.82 MG/DL (ref 0.76–1.27)
CRP SERPL-MCNC: 33.55 MG/DL (ref 0–0.5)
DEPRECATED RDW RBC AUTO: 39.4 FL (ref 37–54)
DEVICE COMMENT: ABNORMAL
EGFRCR SERPLBLD CKD-EPI 2021: 112.5 ML/MIN/1.73
EGFRCR SERPLBLD CKD-EPI 2021: 115.1 ML/MIN/1.73
EGFRCR SERPLBLD CKD-EPI 2021: 115.1 ML/MIN/1.73
EGFRCR SERPLBLD CKD-EPI 2021: 117 ML/MIN/1.73
EGFRCR SERPLBLD CKD-EPI 2021: 117 ML/MIN/1.73
EGFRCR SERPLBLD CKD-EPI 2021: 122.4 ML/MIN/1.73
EGFRCR SERPLBLD CKD-EPI 2021: 124.2 ML/MIN/1.73
ERYTHROCYTE [DISTWIDTH] IN BLOOD BY AUTOMATED COUNT: 13.3 % (ref 12.3–15.4)
GEN 5 2HR TROPONIN T REFLEX: 9 NG/L
GLOBULIN UR ELPH-MCNC: 3.9 GM/DL
GLUCOSE BLDC GLUCOMTR-MCNC: 117 MG/DL (ref 70–130)
GLUCOSE BLDC GLUCOMTR-MCNC: 135 MG/DL (ref 70–130)
GLUCOSE BLDC GLUCOMTR-MCNC: 149 MG/DL (ref 70–130)
GLUCOSE BLDC GLUCOMTR-MCNC: 149 MG/DL (ref 70–130)
GLUCOSE BLDC GLUCOMTR-MCNC: 153 MG/DL (ref 70–130)
GLUCOSE BLDC GLUCOMTR-MCNC: 154 MG/DL (ref 70–130)
GLUCOSE BLDC GLUCOMTR-MCNC: 156 MG/DL (ref 70–130)
GLUCOSE BLDC GLUCOMTR-MCNC: 156 MG/DL (ref 70–130)
GLUCOSE BLDC GLUCOMTR-MCNC: 157 MG/DL (ref 70–130)
GLUCOSE BLDC GLUCOMTR-MCNC: 157 MG/DL (ref 70–130)
GLUCOSE BLDC GLUCOMTR-MCNC: 158 MG/DL (ref 70–130)
GLUCOSE BLDC GLUCOMTR-MCNC: 159 MG/DL (ref 70–130)
GLUCOSE BLDC GLUCOMTR-MCNC: 165 MG/DL (ref 70–130)
GLUCOSE BLDC GLUCOMTR-MCNC: 166 MG/DL (ref 70–130)
GLUCOSE BLDC GLUCOMTR-MCNC: 174 MG/DL (ref 70–130)
GLUCOSE BLDC GLUCOMTR-MCNC: 176 MG/DL (ref 70–130)
GLUCOSE BLDC GLUCOMTR-MCNC: 176 MG/DL (ref 70–130)
GLUCOSE BLDC GLUCOMTR-MCNC: 180 MG/DL (ref 70–130)
GLUCOSE SERPL-MCNC: 146 MG/DL (ref 65–99)
GLUCOSE SERPL-MCNC: 148 MG/DL (ref 65–99)
GLUCOSE SERPL-MCNC: 156 MG/DL (ref 65–99)
GLUCOSE SERPL-MCNC: 156 MG/DL (ref 65–99)
GLUCOSE SERPL-MCNC: 157 MG/DL (ref 65–99)
GLUCOSE SERPL-MCNC: 160 MG/DL (ref 65–99)
GLUCOSE SERPL-MCNC: 169 MG/DL (ref 65–99)
HCO3 BLDV-SCNC: 18.3 MMOL/L (ref 22–28)
HCT VFR BLD AUTO: 31.4 % (ref 37.5–51)
HGB BLD-MCNC: 10.4 G/DL (ref 13–17.7)
LAB AP CASE REPORT: NORMAL
MAGNESIUM SERPL-MCNC: 1.6 MG/DL (ref 1.6–2.6)
MAGNESIUM SERPL-MCNC: 1.7 MG/DL (ref 1.6–2.6)
MAGNESIUM SERPL-MCNC: 1.8 MG/DL (ref 1.6–2.6)
MCH RBC QN AUTO: 26.7 PG (ref 26.6–33)
MCHC RBC AUTO-ENTMCNC: 33.1 G/DL (ref 31.5–35.7)
MCV RBC AUTO: 80.7 FL (ref 79–97)
MODALITY: ABNORMAL
PATH REPORT.FINAL DX SPEC: NORMAL
PATH REPORT.GROSS SPEC: NORMAL
PCO2 BLDV: 25.6 MM HG (ref 41–51)
PH BLDV: 7.46 PH UNITS (ref 7.31–7.41)
PHOSPHATE SERPL-MCNC: 2.2 MG/DL (ref 2.5–4.5)
PHOSPHATE SERPL-MCNC: 2.4 MG/DL (ref 2.5–4.5)
PHOSPHATE SERPL-MCNC: 2.7 MG/DL (ref 2.5–4.5)
PHOSPHATE SERPL-MCNC: 2.9 MG/DL (ref 2.5–4.5)
PHOSPHATE SERPL-MCNC: 3.4 MG/DL (ref 2.5–4.5)
PHOSPHATE SERPL-MCNC: 4.1 MG/DL (ref 2.5–4.5)
PLATELET # BLD AUTO: 383 10*3/MM3 (ref 140–450)
PMV BLD AUTO: 8.2 FL (ref 6–12)
PO2 BLDV: 69.4 MM HG (ref 35–45)
POTASSIUM SERPL-SCNC: 3.7 MMOL/L (ref 3.5–5.2)
POTASSIUM SERPL-SCNC: 3.8 MMOL/L (ref 3.5–5.2)
POTASSIUM SERPL-SCNC: 3.9 MMOL/L (ref 3.5–5.2)
POTASSIUM SERPL-SCNC: 3.9 MMOL/L (ref 3.5–5.2)
POTASSIUM SERPL-SCNC: 4 MMOL/L (ref 3.5–5.2)
POTASSIUM SERPL-SCNC: 4 MMOL/L (ref 3.5–5.2)
POTASSIUM SERPL-SCNC: 4.3 MMOL/L (ref 3.5–5.2)
PROT SERPL-MCNC: 6.9 G/DL (ref 6–8.5)
QT INTERVAL: 330 MS
QTC INTERVAL: 449 MS
RBC # BLD AUTO: 3.89 10*6/MM3 (ref 4.14–5.8)
SAO2 % BLDCOV: 95 % (ref 45–75)
SODIUM SERPL-SCNC: 128 MMOL/L (ref 136–145)
SODIUM SERPL-SCNC: 131 MMOL/L (ref 136–145)
SODIUM SERPL-SCNC: 131 MMOL/L (ref 136–145)
SODIUM SERPL-SCNC: 133 MMOL/L (ref 136–145)
SODIUM SERPL-SCNC: 134 MMOL/L (ref 136–145)
SODIUM SERPL-SCNC: 135 MMOL/L (ref 136–145)
SODIUM SERPL-SCNC: 135 MMOL/L (ref 136–145)
TOTAL RATE: 18 BREATHS/MINUTE
TROPONIN T DELTA: 1 NG/L
WBC NRBC COR # BLD AUTO: 17.27 10*3/MM3 (ref 3.4–10.8)

## 2024-08-08 PROCEDURE — 25010000002 HYDROMORPHONE PER 4 MG

## 2024-08-08 PROCEDURE — 84100 ASSAY OF PHOSPHORUS: CPT | Performed by: INTERNAL MEDICINE

## 2024-08-08 PROCEDURE — 99233 SBSQ HOSP IP/OBS HIGH 50: CPT | Performed by: INTERNAL MEDICINE

## 2024-08-08 PROCEDURE — 25010000002 ONDANSETRON PER 1 MG: Performed by: STUDENT IN AN ORGANIZED HEALTH CARE EDUCATION/TRAINING PROGRAM

## 2024-08-08 PROCEDURE — 25010000002 VANCOMYCIN 10 G RECONSTITUTED SOLUTION: Performed by: STUDENT IN AN ORGANIZED HEALTH CARE EDUCATION/TRAINING PROGRAM

## 2024-08-08 PROCEDURE — 84484 ASSAY OF TROPONIN QUANT: CPT | Performed by: INTERNAL MEDICINE

## 2024-08-08 PROCEDURE — 83735 ASSAY OF MAGNESIUM: CPT | Performed by: INTERNAL MEDICINE

## 2024-08-08 PROCEDURE — 63710000001 INSULIN GLARGINE PER 5 UNITS

## 2024-08-08 PROCEDURE — 82803 BLOOD GASES ANY COMBINATION: CPT

## 2024-08-08 PROCEDURE — 25010000002 VANCOMYCIN 10 G RECONSTITUTED SOLUTION: Performed by: INTERNAL MEDICINE

## 2024-08-08 PROCEDURE — 25010000002 CEFEPIME PER 500 MG: Performed by: PODIATRIST

## 2024-08-08 PROCEDURE — 25010000002 PROCHLORPERAZINE 10 MG/2ML SOLUTION

## 2024-08-08 PROCEDURE — 86140 C-REACTIVE PROTEIN: CPT | Performed by: PODIATRIST

## 2024-08-08 PROCEDURE — 82948 REAGENT STRIP/BLOOD GLUCOSE: CPT

## 2024-08-08 PROCEDURE — 85027 COMPLETE CBC AUTOMATED: CPT | Performed by: PODIATRIST

## 2024-08-08 PROCEDURE — 80053 COMPREHEN METABOLIC PANEL: CPT | Performed by: INTERNAL MEDICINE

## 2024-08-08 PROCEDURE — 25810000003 SODIUM CHLORIDE 0.9 % SOLUTION: Performed by: INTERNAL MEDICINE

## 2024-08-08 PROCEDURE — 25810000003 SODIUM CHLORIDE 0.9 % SOLUTION: Performed by: STUDENT IN AN ORGANIZED HEALTH CARE EDUCATION/TRAINING PROGRAM

## 2024-08-08 RX ORDER — NICOTINE POLACRILEX 4 MG
15 LOZENGE BUCCAL
Status: DISCONTINUED | OUTPATIENT
Start: 2024-08-08 | End: 2024-08-10

## 2024-08-08 RX ORDER — IBUPROFEN 600 MG/1
1 TABLET ORAL
Status: DISCONTINUED | OUTPATIENT
Start: 2024-08-08 | End: 2024-08-10

## 2024-08-08 RX ORDER — INSULIN LISPRO 100 [IU]/ML
1-200 INJECTION, SOLUTION INTRAVENOUS; SUBCUTANEOUS AS NEEDED
Status: DISCONTINUED | OUTPATIENT
Start: 2024-08-08 | End: 2024-08-10

## 2024-08-08 RX ORDER — INSULIN LISPRO 100 [IU]/ML
1-200 INJECTION, SOLUTION INTRAVENOUS; SUBCUTANEOUS AS NEEDED
Status: DISCONTINUED | OUTPATIENT
Start: 2024-08-08 | End: 2024-08-08

## 2024-08-08 RX ORDER — DEXTROSE MONOHYDRATE 25 G/50ML
10-50 INJECTION, SOLUTION INTRAVENOUS
Status: DISCONTINUED | OUTPATIENT
Start: 2024-08-08 | End: 2024-08-10

## 2024-08-08 RX ORDER — DEXTROSE MONOHYDRATE 25 G/50ML
10-50 INJECTION, SOLUTION INTRAVENOUS
Status: DISCONTINUED | OUTPATIENT
Start: 2024-08-08 | End: 2024-08-08

## 2024-08-08 RX ORDER — INSULIN LISPRO 100 [IU]/ML
1-200 INJECTION, SOLUTION INTRAVENOUS; SUBCUTANEOUS
Status: DISCONTINUED | OUTPATIENT
Start: 2024-08-09 | End: 2024-08-09

## 2024-08-08 RX ORDER — INSULIN LISPRO 100 [IU]/ML
1-200 INJECTION, SOLUTION INTRAVENOUS; SUBCUTANEOUS
Status: DISCONTINUED | OUTPATIENT
Start: 2024-08-08 | End: 2024-08-08

## 2024-08-08 RX ORDER — NICOTINE POLACRILEX 4 MG
15 LOZENGE BUCCAL
Status: DISCONTINUED | OUTPATIENT
Start: 2024-08-08 | End: 2024-08-08

## 2024-08-08 RX ORDER — IBUPROFEN 600 MG/1
1 TABLET ORAL
Status: DISCONTINUED | OUTPATIENT
Start: 2024-08-08 | End: 2024-08-08

## 2024-08-08 RX ADMIN — HYDROMORPHONE HYDROCHLORIDE 0.5 MG: 1 INJECTION, SOLUTION INTRAMUSCULAR; INTRAVENOUS; SUBCUTANEOUS at 10:48

## 2024-08-08 RX ADMIN — CEFEPIME 2000 MG: 2 INJECTION, POWDER, FOR SOLUTION INTRAVENOUS at 12:59

## 2024-08-08 RX ADMIN — Medication 10 ML: at 08:21

## 2024-08-08 RX ADMIN — OXYCODONE HYDROCHLORIDE 5 MG: 5 TABLET ORAL at 12:59

## 2024-08-08 RX ADMIN — VANCOMYCIN HYDROCHLORIDE 1750 MG: 10 INJECTION, POWDER, LYOPHILIZED, FOR SOLUTION INTRAVENOUS at 05:02

## 2024-08-08 RX ADMIN — HYDROMORPHONE HYDROCHLORIDE 0.5 MG: 1 INJECTION, SOLUTION INTRAMUSCULAR; INTRAVENOUS; SUBCUTANEOUS at 07:30

## 2024-08-08 RX ADMIN — POTASSIUM CHLORIDE, DEXTROSE MONOHYDRATE AND SODIUM CHLORIDE 150 ML/HR: 150; 5; 900 INJECTION, SOLUTION INTRAVENOUS at 03:14

## 2024-08-08 RX ADMIN — PROCHLORPERAZINE EDISYLATE 5 MG: 5 INJECTION INTRAMUSCULAR; INTRAVENOUS at 20:42

## 2024-08-08 RX ADMIN — HYDROMORPHONE HYDROCHLORIDE 0.5 MG: 1 INJECTION, SOLUTION INTRAMUSCULAR; INTRAVENOUS; SUBCUTANEOUS at 22:25

## 2024-08-08 RX ADMIN — OXYCODONE HYDROCHLORIDE 5 MG: 5 TABLET ORAL at 20:42

## 2024-08-08 RX ADMIN — ONDANSETRON 4 MG: 2 INJECTION INTRAMUSCULAR; INTRAVENOUS at 03:37

## 2024-08-08 RX ADMIN — HYDROMORPHONE HYDROCHLORIDE 0.5 MG: 1 INJECTION, SOLUTION INTRAMUSCULAR; INTRAVENOUS; SUBCUTANEOUS at 03:37

## 2024-08-08 RX ADMIN — VANCOMYCIN HYDROCHLORIDE 1750 MG: 10 INJECTION, POWDER, LYOPHILIZED, FOR SOLUTION INTRAVENOUS at 17:10

## 2024-08-08 RX ADMIN — INSULIN GLARGINE 24 UNITS: 100 INJECTION, SOLUTION SUBCUTANEOUS at 22:25

## 2024-08-08 RX ADMIN — SERTRALINE 50 MG: 50 TABLET, FILM COATED ORAL at 08:21

## 2024-08-08 RX ADMIN — CEFEPIME 2000 MG: 2 INJECTION, POWDER, FOR SOLUTION INTRAVENOUS at 03:37

## 2024-08-08 RX ADMIN — HYDROMORPHONE HYDROCHLORIDE 0.5 MG: 1 INJECTION, SOLUTION INTRAMUSCULAR; INTRAVENOUS; SUBCUTANEOUS at 15:34

## 2024-08-08 RX ADMIN — HYDROMORPHONE HYDROCHLORIDE 0.5 MG: 1 INJECTION, SOLUTION INTRAMUSCULAR; INTRAVENOUS; SUBCUTANEOUS at 19:00

## 2024-08-08 RX ADMIN — ACETAMINOPHEN 1000 MG: 500 TABLET ORAL at 03:37

## 2024-08-08 RX ADMIN — POTASSIUM CHLORIDE, DEXTROSE MONOHYDRATE AND SODIUM CHLORIDE 150 ML/HR: 150; 5; 900 INJECTION, SOLUTION INTRAVENOUS at 13:16

## 2024-08-08 RX ADMIN — ACETAMINOPHEN 1000 MG: 500 TABLET ORAL at 21:26

## 2024-08-08 NOTE — PROGRESS NOTES
"                                              LOS: 2 days   Patient Care Team:  Provider, No Known as PCP - General    Chief Complaint:  F/up DKA, sepsis, right foot cellulitis/abscess and critical care management    Subjective   Interval History      Feeling much better compared to yesterday.  No body aches.  Headache improved as well.  However he is very hungry and very irritable because of that.    REVIEW OF SYSTEMS:   CARDIOVASCULAR: No chest pain, chest pressure or chest discomfort. No palpitations or edema.   RESPIRATORY: No shortness of breath, cough or sputum.   GASTROINTESTINAL: No anorexia, nausea, vomiting or diarrhea. No abdominal pain.  CONSTITUTIONAL: No fever or chills.     Ventilator/Non-Invasive Ventilation Settings (From admission, onward)      None                  Physical Exam:     Vital Signs  Temp:  [97.7 °F (36.5 °C)-99.5 °F (37.5 °C)] 98.3 °F (36.8 °C)  Heart Rate:  [] 94  Resp:  [16-20] 20  BP: (118-142)/(74-88) 130/82    Intake/Output Summary (Last 24 hours) at 8/8/2024 1430  Last data filed at 8/8/2024 1200  Gross per 24 hour   Intake 0 ml   Output 2800 ml   Net -2800 ml     Flowsheet Rows      Flowsheet Row First Filed Value   Admission Height 175.3 cm (69\") Documented at 08/05/2024 1555   Admission Weight 78.5 kg (173 lb) Documented at 08/05/2024 1555            PPE used per hospital policy    General Appearance:   Alert, cooperative, in no acute distress   ENMT:  Mallampati score 3, moist mucous membrane   Eyes:  Pupils equal and reactive to light. EOMI   Neck:    Trachea midline. No thyromegaly.   Lungs:    Clear to auscultation,respirations regular, even and nonlabored    Heart:   Regular rhythm and normal rate, normal S1 and S2, no         murmur   Skin:   No rash or ecchymosis   Abdomen:    Obese. Soft. No tenderness. No HSM.   Neuro/psych:  Conscious, alert, oriented x3. Strength 5/5 in upper and lower  ext.  Appropriate mood and affect   Extremities:  No cyanosis, clubbing " or edema.  Warm extremities and well-perfused.  Right foot wrapped.          Results Review:        Results from last 7 days   Lab Units 08/08/24  1311 08/08/24  0835 08/08/24  0501   SODIUM mmol/L 133* 135* 131*  131*   POTASSIUM mmol/L 3.8 4.3 3.9  3.9   CHLORIDE mmol/L 99 108* 101  101   CO2 mmol/L 15.3* 15.4* 15.3*  15.3*   BUN mg/dL 8 7 9  9   CREATININE mg/dL 0.72* 0.62* 0.76  0.76   GLUCOSE mg/dL 148* 146* 156*  156*   CALCIUM mg/dL 8.9 7.7* 9.0  9.0     Results from last 7 days   Lab Units 08/08/24  0051 08/07/24  1938   HSTROP T ng/L 9 8     Results from last 7 days   Lab Units 08/08/24  0501 08/07/24  1938 08/07/24  0500   WBC 10*3/mm3 17.27* 22.19* 19.88*   HEMOGLOBIN g/dL 10.4* 11.1* 11.3*   HEMATOCRIT % 31.4* 33.2* 34.9*   PLATELETS 10*3/mm3 383 440 439                     Results from last 7 days   Lab Units 08/08/24  0501 08/05/24  1709   CRP mg/dL 33.55* 23.66*               I reviewed the patient's new clinical results.        Medication Review:   nicotine, 1 patch, Transdermal, Q24H  sertraline, 50 mg, Oral, Daily  sodium chloride, 10 mL, Intravenous, Q12H  sodium chloride, 10 mL, Intravenous, Q12H  vancomycin, 1,750 mg, Intravenous, Q12H        dextrose 5 % and sodium chloride 0.45 %, 150 mL/hr  dextrose 5 % and sodium chloride 0.45 % with KCl 20 mEq/L, 150 mL/hr, Last Rate: 150 mL/hr (08/07/24 2136)  dextrose 5 % and sodium chloride 0.45 % with KCl 40 mEq/L, 150 mL/hr  dextrose 5 % and sodium chloride 0.9 %, 150 mL/hr  dextrose 5 % and sodium chloride 0.9 % with KCl 20 mEq, 150 mL/hr, Last Rate: 150 mL/hr (08/08/24 1316)  dextrose 5% and sodium chloride 0.9% with KCl 40 mEq/L, 150 mL/hr  insulin, 0-100 Units/hr, Last Rate: 1.2 Units/hr (08/08/24 1426)  lactated ringers, 9 mL/hr, Last Rate: 9 mL/hr (08/07/24 2216)  Pharmacy to dose vancomycin,   sodium chloride 0.45 % 1,000 mL with potassium chloride 40 mEq infusion, 250 mL/hr  sodium chloride, 250 mL/hr  sodium chloride 0.45 % with KCl 20  mEq, 250 mL/hr  sodium chloride, 250 mL/hr  sodium chloride 0.9 % with KCl 20 mEq, 250 mL/hr  sodium chloride 0.9 % with KCl 40 mEq/L, 250 mL/hr            Assessment     DKA  Right foot cellulitis/abscess s/p I&D 8/8/2024, MRSA  Sepsis, secondary to #2  Uncontrolled diabetes, A1c has been always >10 since 2022.  Latest level on this admission is 12.5  Abnormal imaging of the right metatarsal s/p right fifth metatarsal bone biopsy  Anemia  Tobacco abuse        Plan     Antibiotics with vancomycin.  Cefepime stopped as wound cultures consistent with MRSA.  Continue insulin drip.  Anion gap closed but serum bicarb remains low.  Will check the next BMP and if serum bicarb does not get worse then we could transition to subcu insulin.  Continue IV hydration with D1/2 NS.  Potassium replacement  Initiate DVT prophylaxis with heparin subcu  Nicotine patch  Diabetes educator    Discussed with Dr. Saenz and Mumtaz.  Discussed with ICU staff.    Vin Perry MD  08/08/24  14:30 EDT    Time: Critical care 32 min      This note was dictated utilizing Dragon dictation

## 2024-08-08 NOTE — PROGRESS NOTES
"[facility] Clinical Pharmacy Services: Vancomycin Monitoring Note    Shayy Ellison is a 43 y.o. male who is on day 3/7 of pharmacy to dose vancomycin for Skin and Soft Tissue. Dr. Saenz is following.    Previous Vancomycin Dose:    1750 mg IV every  12  hours  Updated Cultures and Sensitivities:   -8/7 surgical site rt foot:  3+ MRSA    Results from last 7 days   Lab Units 08/07/24  1345   VANCOMYCIN TR mcg/mL 5.60     Vitals/Labs  Ht: 175 cm (68.9\"); Wt: 80 kg (176 lb 5.9 oz)   Temp Readings from Last 1 Encounters:   08/08/24 97.7 °F (36.5 °C) (Oral)     Estimated Creatinine Clearance: 175.6 mL/min (A) (by C-G formula based on SCr of 0.62 mg/dL (L)).       Results from last 7 days   Lab Units 08/08/24  0835 08/08/24  0501 08/08/24  0051 08/07/24  1938 08/07/24  0500   CREATININE mg/dL 0.62* 0.76  0.76 0.82 0.79  0.79 0.71*   WBC 10*3/mm3  --  17.27*  --  22.19* 19.88*     Assessment/Plan    -moved to icu 8/7; scr trending down; reached out to ID today and got therapy adjusted.    Current Vancomycin Dose: 1750 mg IV every  12  hours; provides a predicted  mg/L.hr   Next Level Date and Time: trough 8/9 1600  We will continue to monitor patient changes and renal function     Thank you for involving pharmacy in this patient's care. Please contact pharmacy with any questions or concerns.       Salty Cobb, [facility]  Clinical Pharmacist          "

## 2024-08-08 NOTE — PROGRESS NOTES
Name: Shayy Ellison ADMIT: 2024   : 1980  PCP: Provider, No Known    MRN: [id number] LOS: 2 days   AGE/SEX: 43 y.o. male  ROOM: [facility]     Subjective     Patient was tested Jaimie yesterday due to DKA.  Beta hydroxybutyrate was elevated at 3.7 and his anion gap was high.  This morning his anion gap is closed however his bicarbonate level remains low.    He appears to be doing better and has no new complaints.    Objective   Objective   Vital Signs  Temp:  [97.7 °F (36.5 °C)-99.5 °F (37.5 °C)] 98.3 °F (36.8 °C)  Heart Rate:  [] 94  Resp:  [16-20] 20  BP: (118-142)/(74-88) 130/82  SpO2:  [93 %-98 %] 96 %  on   ;   Device (Oxygen Therapy): room air  Body mass index is 26.12 kg/m².  Physical Exam  Constitutional:       General: He is not in acute distress.  HENT:      Head: Normocephalic and atraumatic.   Eyes:      Extraocular Movements: Extraocular movements intact.      Pupils: Pupils are equal, round, and reactive to light.   Cardiovascular:      Rate and Rhythm: Normal rate and regular rhythm.   Pulmonary:      Effort: Pulmonary effort is normal. No respiratory distress.   Abdominal:      General: There is no distension.      Palpations: Abdomen is soft.      Tenderness: There is no abdominal tenderness.   Musculoskeletal:         General: No swelling. Normal range of motion.   Skin:     General: Skin is warm and dry.   Neurological:      Mental Status: He is alert and oriented to person, place, and time.      Cranial Nerves: No cranial nerve deficit.      Comments: lethargic           Results Review     I reviewed the patient's new clinical results.  Results from last 7 days   Lab Units 24  0501 24  1938 24  0500 24  1037   WBC 10*3/mm3 17.27* 22.19* 19.88* 15.48*   HEMOGLOBIN g/dL 10.4* 11.1* 11.3* 11.4*   PLATELETS 10*3/mm3 383 440 439 413     Results from last 7 days   Lab Units 24  1311 24  0835 24  0501 24  0051   SODIUM mmol/L 133* 135* 131*  " 131* 128*   POTASSIUM mmol/L 3.8 4.3 3.9  3.9 4.0   CHLORIDE mmol/L 99 108* 101  101 98   CO2 mmol/L 15.3* 15.4* 15.3*  15.3* 14.2*   BUN mg/dL 8 7 9  9 9   CREATININE mg/dL 0.72* 0.62* 0.76  0.76 0.82   GLUCOSE mg/dL 148* 146* 156*  156* 157*   Estimated Creatinine Clearance: 151.2 mL/min (A) (by C-G formula based on SCr of 0.72 mg/dL (L)).  Results from last 7 days   Lab Units 08/08/24  0501 08/07/24 1938 08/05/24  1709   ALBUMIN g/dL 3.0* 3.4* 3.6   BILIRUBIN mg/dL 0.6 0.7 0.5   ALK PHOS U/L 79 92 86   AST (SGOT) U/L 6 11 6   ALT (SGPT) U/L 7 6 7     Results from last 7 days   Lab Units 08/08/24  1311 08/08/24  0835 08/08/24  0501 08/08/24  0051 08/07/24 1938 08/06/24  1037 08/05/24  1709   CALCIUM mg/dL 8.9 7.7* 9.0  9.0 9.0 9.1  9.1   < > 9.2   ALBUMIN g/dL  --   --  3.0*  --  3.4*  --  3.6   MAGNESIUM mg/dL 1.7 1.6 1.7 1.7 1.6  1.6   < >  --    PHOSPHORUS mg/dL 2.7 2.9 3.4 4.1 4.1  4.3   < >  --     < > = values in this interval not displayed.     Results from last 7 days   Lab Units 08/07/24 1938 08/05/24  1709   LACTATE mmol/L 0.7 1.4   No results found for: \"COVID19\"  Hemoglobin A1C   Date/Time Value Ref Range Status   08/07/2024 1938 11.80 (H) 4.80 - 5.60 % Final   08/06/2024 1037 12.50 (H) 4.80 - 5.60 % Final     Glucose   Date/Time Value Ref Range Status   08/08/2024 1426 157 (H) 70 - 130 mg/dL Final   08/08/2024 1312 149 (H) 70 - 130 mg/dL Final   08/08/2024 1158 149 (H) 70 - 130 mg/dL Final   08/08/2024 1041 166 (H) 70 - 130 mg/dL Final   08/08/2024 0915 154 (H) 70 - 130 mg/dL Final   08/08/2024 0833 117 70 - 130 mg/dL Final   08/08/2024 0721 157 (H) 70 - 130 mg/dL Final     Results for orders placed or performed during the hospital encounter of 08/05/24   Blood Culture - Blood, Arm, Left    Specimen: Arm, Left; Blood   Result Value Ref Range    Blood Culture No growth at 2 days          XR Chest 1 View  Narrative: XR CHEST 1 VW-     HISTORY: Male who is 43 years-old, possible fluid " overload     TECHNIQUE: Frontal view of the chest     COMPARISON: None available     FINDINGS: Heart, mediastinum and pulmonary vasculature are unremarkable.  Small atelectasis or scarring at the left base. No focal pulmonary  consolidation, pleural effusion, or pneumothorax. No acute osseous  process.     Impression: Small likely scarring or atelectasis at the left base. No  focal pulmonary consolidation or edema. Follow-up as clinical  indications persist.     This report was finalized on 8/7/2024 7:38 PM by Dr. Donnell Alfredo M.D on Workstation: [id number]       Scheduled Medications  Edit Initial Basal Insulin Transition Dose, 1 each, Does not apply, Once  insulin glargine, 1-200 Units, Subcutaneous, Nightly - Glucommander  insulin lispro, 1-200 Units, Subcutaneous, 4x Daily With Meals & Nightly  nicotine, 1 patch, Transdermal, Q24H  sertraline, 50 mg, Oral, Daily  sodium chloride, 10 mL, Intravenous, Q12H  sodium chloride, 10 mL, Intravenous, Q12H  vancomycin, 1,750 mg, Intravenous, Q12H    Infusions  dextrose 5 % and sodium chloride 0.45 %, 150 mL/hr  dextrose 5 % and sodium chloride 0.45 % with KCl 20 mEq/L, 150 mL/hr, Last Rate: 150 mL/hr (08/07/24 2136)  dextrose 5 % and sodium chloride 0.45 % with KCl 40 mEq/L, 150 mL/hr  dextrose 5 % and sodium chloride 0.9 %, 150 mL/hr  dextrose 5 % and sodium chloride 0.9 % with KCl 20 mEq, 150 mL/hr, Last Rate: 150 mL/hr (08/08/24 1316)  dextrose 5% and sodium chloride 0.9% with KCl 40 mEq/L, 150 mL/hr  insulin, 0-100 Units/hr, Last Rate: 1.2 Units/hr (08/08/24 1426)  lactated ringers, 9 mL/hr, Last Rate: 9 mL/hr (08/07/24 0725)  Pharmacy to dose vancomycin,   sodium chloride 0.45 % 1,000 mL with potassium chloride 40 mEq infusion, 250 mL/hr  sodium chloride, 250 mL/hr  sodium chloride 0.45 % with KCl 20 mEq, 250 mL/hr  sodium chloride, 250 mL/hr  sodium chloride 0.9 % with KCl 20 mEq, 250 mL/hr  sodium chloride 0.9 % with KCl 40 mEq/L, 250 mL/hr    Diet  Diet:  Diabetic; Consistent Carbohydrate; Fluid Consistency: Thin (IDDSI 0)       Assessment/Plan     Active Hospital Problems    Diagnosis  POA    **Soft tissue infection [L08.9]  Yes    Sepsis [A41.9]  Unknown    Diabetic foot infection [E11.628, L08.9]  Yes    Cellulitis of right foot [L03.115]  Unknown    Abscess of right foot [L02.611]  Unknown      Resolved Hospital Problems   No resolved problems to display.       43 y.o. male admitted with Soft tissue infection.    Diabetic ketoacidosis  Currently on insulin drip.    Type 1 diabetes  A1c is 12.5.  He will require insulin at discharge.  He also reports that he does not have a PCP.    Osteomyelitis  Right foot abscess  Wcx with MRSA. On Vancomycin. Cefepime discontinued. .  Status post I&D along with biopsy of right fifth metatarsal.        Andrea Pandya MD  [facility]  08/08/24  14:45 EDT

## 2024-08-08 NOTE — PROGRESS NOTES
Infectious diseases progress note     chief complaint: Follow-up deep right foot infection    Subjective: Patient moved to the ICU yesterday afternoon for DKA.  He is on an insulin drip.  No new symptoms of the foot today.  His operative cultures are pending.  He is tolerating vancomycin and cefepime without rash or diarrhea.      Medications:    Current Facility-Administered Medications:     acetaminophen (TYLENOL) tablet 1,000 mg, 1,000 mg, Oral, Q8H PRN, Alexander Freeman DPM, 1,000 mg at 08/08/24 0337    sennosides-docusate (PERICOLACE) 8.6-50 MG per tablet 2 tablet, 2 tablet, Oral, BID PRN **AND** polyethylene glycol (MIRALAX) packet 17 g, 17 g, Oral, Daily PRN **AND** bisacodyl (DULCOLAX) EC tablet 5 mg, 5 mg, Oral, Daily PRN **AND** bisacodyl (DULCOLAX) suppository 10 mg, 10 mg, Rectal, Daily PRN, Alexander Freeman DPM    Calcium Replacement - Follow Nurse / BPA Driven Protocol, , Does not apply, PRN, Alexander Freeman DPM    cefepime 2000 mg IVPB in 100 mL NS (MBP), 2,000 mg, Intravenous, Q8H, Alexander Freeman DPM, 2,000 mg at 08/08/24 0337    dextrose (D50W) (25 g/50 mL) IV injection 10-50 mL, 10-50 mL, Intravenous, Q15 Min PRN, Vin Perry MD    dextrose (D50W) (25 g/50 mL) IV injection 25 g, 25 g, Intravenous, Q15 Min PRN, Alexander Freeman DPM    dextrose (GLUTOSE) oral gel 15 g, 15 g, Oral, Q15 Min PRN, Alexander Freeman DPM    dextrose (GLUTOSE) oral gel 15 g, 15 g, Oral, Q15 Min PRN, Vin Perry MD    dextrose 5 % and sodium chloride 0.45 % infusion, 150 mL/hr, Intravenous, Continuous PRN, Vin Perry MD    dextrose 5 % and sodium chloride 0.45 % with KCl 20 mEq/L infusion, 150 mL/hr, Intravenous, Continuous PRN, Vin Perry MD, Last Rate: 150 mL/hr at 08/07/24 2136, 150 mL/hr at 08/07/24 2136    dextrose 5 % and sodium chloride 0.45 % with KCl 40 mEq/L infusion, 150 mL/hr, Intravenous, Continuous PRN, Vin Perry MD    dextrose 5 % and sodium chloride 0.9 % infusion, 150 mL/hr,  Intravenous, Continuous PRN, Vin Perry MD    dextrose 5 % and sodium chloride 0.9 % with KCl 20 mEq/L infusion, 150 mL/hr, Intravenous, Continuous PRN, Vin Perry MD, Last Rate: 150 mL/hr at 08/08/24 0314, 150 mL/hr at 08/08/24 0314    dextrose 5 % and sodium chloride 0.9 % with KCl 40 mEq/L infusion, 150 mL/hr, Intravenous, Continuous PRN, Vin Perry MD    glucagon (GLUCAGEN) injection 1 mg, 1 mg, Intramuscular, Q15 Min PRN, Alexander Freeman DPM    glucagon (GLUCAGEN) injection 1 mg, 1 mg, Intramuscular, Q15 Min PRN, Vin Perry MD    HYDROmorphone (DILAUDID) injection 0.5 mg, 0.5 mg, Intravenous, Q3H PRN, Kimberly Huang APRN, 0.5 mg at 08/08/24 0730    ibuprofen (ADVIL,MOTRIN) tablet 600 mg, 600 mg, Oral, Q6H PRN, Alexander Freeman DPM, 600 mg at 08/06/24 1223    insulin regular 1 unit/mL in 0.9% sodium chloride (Glucommander), 0-100 Units/hr, Intravenous, Titrated, Vin Perry MD, Last Rate: 0.6 mL/hr at 08/08/24 0839, 0.6 Units/hr at 08/08/24 0839    lactated ringers infusion, 9 mL/hr, Intravenous, Continuous, Alexander Freeman DPM, Last Rate: 9 mL/hr at 08/07/24 0725, Restarted at 08/07/24 0734    Magnesium Standard Dose Replacement - Follow Nurse / BPA Driven Protocol, , Does not apply, PRN, Alexander Freeman DPM    nicotine (NICODERM CQ) 21 MG/24HR patch 1 patch, 1 patch, Transdermal, Q24H, Alexander Freeman DPM, 1 patch at 08/06/24 3927    ondansetron (ZOFRAN) injection 4 mg, 4 mg, Intravenous, Q6H PRN, Andrea Pandya MD, 4 mg at 08/08/24 0337    oxyCODONE (ROXICODONE) immediate release tablet 5 mg, 5 mg, Oral, Q4H PRN, Alexander Freeman DPM, 5 mg at 08/07/24 1739    Pharmacy to dose vancomycin, , Does not apply, Continuous PRN, Rashid Saenz MD    Phosphorus Replacement - Follow Nurse / BPA Driven Protocol, , Does not apply, PRLydia FLOWERS Andrew, DPM    Potassium Replacement - Follow Nurse / BPA Driven Protocol, , Does not apply, Lydia EDWARDS Andrew, DPM     prochlorperazine (COMPAZINE) injection 5 mg, 5 mg, Intravenous, Q6H PRN, Kimberly Huang APRN    sertraline (ZOLOFT) tablet 50 mg, 50 mg, Oral, Daily, Alexander Freeman, DPM, 50 mg at 08/08/24 0821    sodium chloride 0.45 % 1,000 mL with potassium chloride 40 mEq infusion, 250 mL/hr, Intravenous, Continuous PRN, Vin Perry MD    sodium chloride 0.45 % infusion, 250 mL/hr, Intravenous, Continuous PRN, Vin Perry MD    sodium chloride 0.45 % with KCl 20 mEq/L infusion, 250 mL/hr, Intravenous, Continuous PRN, Vin Perry MD    [COMPLETED] Insert Peripheral IV, , , Once **AND** sodium chloride 0.9 % flush 10 mL, 10 mL, Intravenous, PRN, Alexander Freeman, DPM    sodium chloride 0.9 % flush 10 mL, 10 mL, Intravenous, Q12H, Alexander Freeman, DPM, 10 mL at 08/08/24 0821    sodium chloride 0.9 % flush 10 mL, 10 mL, Intravenous, PRN, Alexander Freeman, DPM    sodium chloride 0.9 % flush 10 mL, 10 mL, Intravenous, Q12H, Vin Perry MD, 10 mL at 08/08/24 0821    sodium chloride 0.9 % flush 10 mL, 10 mL, Intravenous, PRN, Vin Perry MD    sodium chloride 0.9 % infusion 40 mL, 40 mL, Intravenous, PRN, Lucas Freemanw, DPM    sodium chloride 0.9 % infusion 40 mL, 40 mL, Intravenous, PRN, Vin Perry MD    sodium chloride 0.9 % infusion, 250 mL/hr, Intravenous, Continuous PRN, Vin Perry MD    sodium chloride 0.9 % with KCl 20 mEq/L infusion, 250 mL/hr, Intravenous, Continuous PRN, Vin Perry MD    sodium chloride 0.9 % with KCl 40 mEq/L infusion, 250 mL/hr, Intravenous, Continuous PRN, Vin Perry MD    vancomycin 1750 mg/500 mL 0.9% NS IVPB ([facility]), 1,750 mg, Intravenous, Q12H, Andrea Pandya MD, 1,750 mg at 08/08/24 0502      Objective   Vital Signs   Temp:  [97.7 °F (36.5 °C)-99.5 °F (37.5 °C)] 97.7 °F (36.5 °C)  Heart Rate:  [] 85  Resp:  [14-20] 20  BP: (120-156)/() 120/79    Physical Exam:   General: Awake, alert, lying in bed, no acute distress  Eyes: no scleral  "icterus  Cardiovascular: Normal rate  Respiratory: normal work of breathing without wheezing  GI: Abdomen is soft, not tender  Skin: Right foot bandaged  Vasc: PIV w/o erythema    Labs:   CBC, BMP, CRP, blood cultures, and wound cultures reviewed today  Lab Results   Component Value Date    WBC 17.27 (H) 08/08/2024    HGB 10.4 (L) 08/08/2024    HCT 31.4 (L) 08/08/2024    MCV 80.7 08/08/2024     08/08/2024     Lab Results   Component Value Date    GLUCOSE 156 (H) 08/08/2024    GLUCOSE 156 (H) 08/08/2024    CALCIUM 9.0 08/08/2024    CALCIUM 9.0 08/08/2024     (L) 08/08/2024     (L) 08/08/2024    K 3.9 08/08/2024    K 3.9 08/08/2024    CO2 15.3 (L) 08/08/2024    CO2 15.3 (L) 08/08/2024     08/08/2024     08/08/2024    BUN 9 08/08/2024    BUN 9 08/08/2024    CREATININE 0.76 08/08/2024    CREATININE 0.76 08/08/2024    EGFRRESULT 111.6 12/07/2023    EGFR 115.1 08/08/2024    EGFR 115.1 08/08/2024    BCR 11.8 08/08/2024    BCR 11.8 08/08/2024    ANIONGAP 14.7 08/08/2024    ANIONGAP 14.7 08/08/2024     Lab Results   Component Value Date    CRP 33.55 (H) 08/08/2024     Lab Results   Component Value Date    HGBA1C 11.80 (H) 08/07/2024     Lab Results   Component Value Date    VANCOTROUGH 5.60 08/07/2024       Microbiology:  8/5 BCx: Negative to date  8/5 MRSA nares PCR: Positive  8/7 right foot operative cultures: Pending    New radiology:  CXR personally reviewed and shows mild atelectasis at the left base    Prior radiology:  MRI R Foot:   \"1. Plantar-lateral anterior hindfoot, midfoot, proximal forefoot   nonenhancing collection and presumed infected collection/abscess   predominantly centered within the abductor digiting minimi muscle and   extends from the level of the central to anterior calcaneus distally to   the coronal level of the junction of the mid-third and distal-third of   the 5th metatarsal. The collection contacts the plantar aspect of the   peroneus longus tendon where there is " "presumed infectious tenosynovitis   and there is extensive surrounding myositis. Generalized foot myositis   and diffuse cellulitis.   2. Bone marrow edema and enhancement within the base of the 5th   metatarsal and to a lesser degree within the plantar aspect of the   cuboid due to reactive marrow edema or early osteomyelitis.   3. 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   4. Suspect peroneus brevis split tear at the proximal margin of the   field-of-view of this exam. \"    XR R Foot:   \"No acute fracture, erosion, or dislocation is identified. If there is   further clinical concern, MRI or bone scan could be obtained for further   evaluation. A sclerotic focus in the second proximal phalanx, although   nonspecific, may represent bone island.\"    ASSESSMENT/PLAN:  Cellulitis, abscess, tenosynovitis, myositis, and probable early osteomyelitis of the right foot  Uncontrolled type 1 diabetes -last A1c 12.5%  MRSA carrier  Hyponatremia  Elevated C-reactive protein  DKA    On 8/7/2024, he went to the operating room for foot debridement with podiatry.  I reviewed the operative note and the plan tracked all the way down to bone so I am going to treat him as acute osteomyelitis with a 4-week course of antibiotics.    While awaiting operative cultures, continue empiric vancomycin with -600 and cefepime 2 g IV every 8 hours.    While the patient is on vancomycin, vancomycin levels will be ordered and reviewed with dose adjustments made as needed. The patient will have a daily creatinine level checked while on vancomycin as part of monitoring this medication.     DKA management per ICU team.    Going forward, stricter glucose control will be of the utmost importance to promote healing and decrease risk of future infection.    ID will follow.  Case and plan discussed with Dr. Perry and RN.     Addendum: Operative culture from the foot growing MRSA.  Continue vancomycin.  Stop cefepime.    "

## 2024-08-08 NOTE — PLAN OF CARE
Goal Outcome Evaluation: Pt remains in ICU on insulin gtt. VSS on room air. PRN pain medication given x4, pt reports not effective. Good urine output, no BM. Family updated at bedside.       Problem: Adult Inpatient Plan of Care  Goal: Plan of Care Review  Outcome: Ongoing, Progressing     Problem: Adjustment to Illness (Sepsis/Septic Shock)  Goal: Optimal Coping  Outcome: Ongoing, Progressing  Intervention: Optimize Psychosocial Adjustment to Illness  Recent Flowsheet Documentation  Taken 8/8/2024 0800 by Page Stubbs RN  Family/Support System Care:   self-care encouraged   support provided     Problem: Bleeding (Sepsis/Septic Shock)  Goal: Absence of Bleeding  Outcome: Ongoing, Progressing  Intervention: Monitor and Manage Bleeding  Recent Flowsheet Documentation  Taken 8/8/2024 0800 by Page Stubbs RN  Bleeding Precautions:   blood pressure closely monitored   monitored for signs of bleeding  Bleeding Management: dressing monitored     Problem: Glycemic Control Impaired (Sepsis/Septic Shock)  Goal: Blood Glucose Level Within Desired Range  Outcome: Ongoing, Progressing     Problem: Infection Progression (Sepsis/Septic Shock)  Goal: Absence of Infection Signs and Symptoms  Outcome: Ongoing, Progressing  Intervention: Initiate Sepsis Management  Recent Flowsheet Documentation  Taken 8/8/2024 1900 by Page Stubbs RN  Infection Prevention: environmental surveillance performed  Taken 8/8/2024 1800 by Page Stubbs RN  Infection Prevention: environmental surveillance performed  Taken 8/8/2024 1700 by Page Stubbs RN  Infection Prevention: environmental surveillance performed  Taken 8/8/2024 1500 by Page Stubbs RN  Infection Prevention: environmental surveillance performed  Taken 8/8/2024 1400 by Page Stubbs RN  Infection Prevention: environmental surveillance performed  Taken 8/8/2024 1300 by Page Stubbs RN  Infection Prevention: environmental surveillance  performed  Taken 8/8/2024 1200 by Paeg Stubbs RN  Infection Prevention: environmental surveillance performed  Taken 8/8/2024 1100 by Page Stubbs RN  Infection Prevention: environmental surveillance performed  Taken 8/8/2024 1000 by Page Stubbs RN  Infection Prevention: environmental surveillance performed  Taken 8/8/2024 0900 by Page Stubbs RN  Infection Prevention: environmental surveillance performed  Taken 8/8/2024 0800 by Page Stubbs RN  Infection Prevention: environmental surveillance performed  Intervention: Promote Recovery  Recent Flowsheet Documentation  Taken 8/8/2024 0800 by Page Stubbs RN  Activity Management:   dorsiflexion/plantar flexion performed   activity encouraged     Problem: Nutrition Impaired (Sepsis/Septic Shock)  Goal: Optimal Nutrition Intake  Outcome: Ongoing, Progressing     Problem: Fall Injury Risk  Goal: Absence of Fall and Fall-Related Injury  Outcome: Ongoing, Progressing  Intervention: Promote Injury-Free Environment  Recent Flowsheet Documentation  Taken 8/8/2024 1900 by Page Stubbs RN  Safety Promotion/Fall Prevention: safety round/check completed  Taken 8/8/2024 1800 by Page Stubbs RN  Safety Promotion/Fall Prevention: safety round/check completed  Taken 8/8/2024 1700 by Page Stubbs RN  Safety Promotion/Fall Prevention: safety round/check completed  Taken 8/8/2024 1500 by Page Stubbs RN  Safety Promotion/Fall Prevention: safety round/check completed  Taken 8/8/2024 1400 by Page Stubbs RN  Safety Promotion/Fall Prevention: safety round/check completed  Taken 8/8/2024 1300 by Page Stubbs RN  Safety Promotion/Fall Prevention: safety round/check completed  Taken 8/8/2024 1200 by Page Stubbs RN  Safety Promotion/Fall Prevention: safety round/check completed  Taken 8/8/2024 1100 by Page Stubbs RN  Safety Promotion/Fall Prevention: safety round/check completed  Taken 8/8/2024 1000 by  Page Stubbs, RN  Safety Promotion/Fall Prevention: safety round/check completed  Taken 8/8/2024 0900 by Page Stubbs, RN  Safety Promotion/Fall Prevention: safety round/check completed  Taken 8/8/2024 0800 by Page Stubbs, RN  Safety Promotion/Fall Prevention: safety round/check completed     Problem: Skin Injury Risk Increased  Goal: Skin Health and Integrity  Outcome: Ongoing, Progressing  Intervention: Optimize Skin Protection  Recent Flowsheet Documentation  Taken 8/8/2024 0800 by Page Stubbs RN  Head of Bed (HOB) Positioning: HOB at 20-30 degrees  Skin Protection:   adhesive use limited   incontinence pads utilized   transparent dressing maintained   tubing/devices free from skin contact

## 2024-08-09 ENCOUNTER — ANESTHESIA (OUTPATIENT)
Dept: PERIOP | Facility: HOSPITAL | Age: 44
End: 2024-08-09

## 2024-08-09 ENCOUNTER — ANESTHESIA EVENT (OUTPATIENT)
Dept: PERIOP | Facility: HOSPITAL | Age: 44
End: 2024-08-09

## 2024-08-09 LAB
ANION GAP SERPL CALCULATED.3IONS-SCNC: 7.8 MMOL/L (ref 5–15)
BACTERIA SPEC AEROBE CULT: ABNORMAL
BUN SERPL-MCNC: 6 MG/DL (ref 6–20)
BUN/CREAT SERPL: 9.7 (ref 7–25)
CALCIUM SPEC-SCNC: 8.7 MG/DL (ref 8.6–10.5)
CHLORIDE SERPL-SCNC: 106 MMOL/L (ref 98–107)
CO2 SERPL-SCNC: 20.2 MMOL/L (ref 22–29)
CREAT SERPL-MCNC: 0.62 MG/DL (ref 0.76–1.27)
DEPRECATED RDW RBC AUTO: 38.6 FL (ref 37–54)
EGFRCR SERPLBLD CKD-EPI 2021: 122.4 ML/MIN/1.73
ERYTHROCYTE [DISTWIDTH] IN BLOOD BY AUTOMATED COUNT: 13.4 % (ref 12.3–15.4)
GLUCOSE BLDC GLUCOMTR-MCNC: 115 MG/DL (ref 70–130)
GLUCOSE BLDC GLUCOMTR-MCNC: 132 MG/DL (ref 70–130)
GLUCOSE BLDC GLUCOMTR-MCNC: 147 MG/DL (ref 70–130)
GLUCOSE BLDC GLUCOMTR-MCNC: 166 MG/DL (ref 70–130)
GLUCOSE BLDC GLUCOMTR-MCNC: 219 MG/DL (ref 70–130)
GLUCOSE BLDC GLUCOMTR-MCNC: 229 MG/DL (ref 70–130)
GLUCOSE BLDC GLUCOMTR-MCNC: 233 MG/DL (ref 70–130)
GLUCOSE BLDC GLUCOMTR-MCNC: 250 MG/DL (ref 70–130)
GLUCOSE BLDC GLUCOMTR-MCNC: 98 MG/DL (ref 70–130)
GLUCOSE SERPL-MCNC: 98 MG/DL (ref 65–99)
GRAM STN SPEC: ABNORMAL
GRAM STN SPEC: ABNORMAL
HCT VFR BLD AUTO: 31.7 % (ref 37.5–51)
HGB BLD-MCNC: 10.4 G/DL (ref 13–17.7)
MCH RBC QN AUTO: 26.1 PG (ref 26.6–33)
MCHC RBC AUTO-ENTMCNC: 32.8 G/DL (ref 31.5–35.7)
MCV RBC AUTO: 79.6 FL (ref 79–97)
PLATELET # BLD AUTO: 383 10*3/MM3 (ref 140–450)
PMV BLD AUTO: 8.2 FL (ref 6–12)
POTASSIUM SERPL-SCNC: 3.7 MMOL/L (ref 3.5–5.2)
RBC # BLD AUTO: 3.98 10*6/MM3 (ref 4.14–5.8)
SODIUM SERPL-SCNC: 134 MMOL/L (ref 136–145)
VANCOMYCIN TROUGH SERPL-MCNC: 8.9 MCG/ML (ref 5–20)
WBC NRBC COR # BLD AUTO: 11.29 10*3/MM3 (ref 3.4–10.8)

## 2024-08-09 PROCEDURE — 25010000002 PROPOFOL 200 MG/20ML EMULSION: Performed by: NURSE ANESTHETIST, CERTIFIED REGISTERED

## 2024-08-09 PROCEDURE — 25010000002 HYDROMORPHONE PER 4 MG

## 2024-08-09 PROCEDURE — 80202 ASSAY OF VANCOMYCIN: CPT | Performed by: INTERNAL MEDICINE

## 2024-08-09 PROCEDURE — 25810000003 LACTATED RINGERS PER 1000 ML: Performed by: ANESTHESIOLOGY

## 2024-08-09 PROCEDURE — 99232 SBSQ HOSP IP/OBS MODERATE 35: CPT | Performed by: INTERNAL MEDICINE

## 2024-08-09 PROCEDURE — 25010000002 HYDROMORPHONE PER 4 MG: Performed by: NURSE ANESTHETIST, CERTIFIED REGISTERED

## 2024-08-09 PROCEDURE — 25010000002 VANCOMYCIN 10 G RECONSTITUTED SOLUTION: Performed by: INTERNAL MEDICINE

## 2024-08-09 PROCEDURE — 25010000002 FENTANYL CITRATE (PF) 50 MCG/ML SOLUTION: Performed by: ANESTHESIOLOGY

## 2024-08-09 PROCEDURE — 63710000001 INSULIN LISPRO (HUMAN) PER 5 UNITS: Performed by: INTERNAL MEDICINE

## 2024-08-09 PROCEDURE — 25010000002 HYDROMORPHONE PER 4 MG: Performed by: PODIATRIST

## 2024-08-09 PROCEDURE — 25010000002 ONDANSETRON PER 1 MG: Performed by: NURSE ANESTHETIST, CERTIFIED REGISTERED

## 2024-08-09 PROCEDURE — 80048 BASIC METABOLIC PNL TOTAL CA: CPT

## 2024-08-09 PROCEDURE — 63710000001 INSULIN GLARGINE PER 5 UNITS: Performed by: INTERNAL MEDICINE

## 2024-08-09 PROCEDURE — 85027 COMPLETE CBC AUTOMATED: CPT | Performed by: INTERNAL MEDICINE

## 2024-08-09 PROCEDURE — 25010000002 LIDOCAINE 1 % SOLUTION 20 ML VIAL: Performed by: PODIATRIST

## 2024-08-09 PROCEDURE — 82948 REAGENT STRIP/BLOOD GLUCOSE: CPT

## 2024-08-09 PROCEDURE — 25810000003 SODIUM CHLORIDE 0.9 % SOLUTION: Performed by: INTERNAL MEDICINE

## 2024-08-09 PROCEDURE — 25010000002 FENTANYL CITRATE (PF) 100 MCG/2ML SOLUTION: Performed by: NURSE ANESTHETIST, CERTIFIED REGISTERED

## 2024-08-09 PROCEDURE — 25010000002 HYDROMORPHONE PER 4 MG: Performed by: INTERNAL MEDICINE

## 2024-08-09 PROCEDURE — 05HY33Z INSERTION OF INFUSION DEVICE INTO UPPER VEIN, PERCUTANEOUS APPROACH: ICD-10-PCS | Performed by: INTERNAL MEDICINE

## 2024-08-09 PROCEDURE — 0JBQ0ZZ EXCISION OF RIGHT FOOT SUBCUTANEOUS TISSUE AND FASCIA, OPEN APPROACH: ICD-10-PCS | Performed by: PODIATRIST

## 2024-08-09 PROCEDURE — 25010000002 BUPIVACAINE (PF) 0.5 % SOLUTION 10 ML VIAL: Performed by: PODIATRIST

## 2024-08-09 PROCEDURE — 25010000002 HYDROMORPHONE 1 MG/ML SOLUTION: Performed by: NURSE ANESTHETIST, CERTIFIED REGISTERED

## 2024-08-09 PROCEDURE — C1751 CATH, INF, PER/CENT/MIDLINE: HCPCS

## 2024-08-09 PROCEDURE — 63710000001 INSULIN LISPRO (HUMAN) PER 5 UNITS

## 2024-08-09 PROCEDURE — 25010000002 FENTANYL CITRATE (PF) 50 MCG/ML SOLUTION: Performed by: NURSE ANESTHETIST, CERTIFIED REGISTERED

## 2024-08-09 RX ORDER — PROMETHAZINE HYDROCHLORIDE 25 MG/1
25 SUPPOSITORY RECTAL ONCE AS NEEDED
Status: DISCONTINUED | OUTPATIENT
Start: 2024-08-09 | End: 2024-08-09 | Stop reason: HOSPADM

## 2024-08-09 RX ORDER — EPHEDRINE SULFATE 50 MG/ML
5 INJECTION, SOLUTION INTRAVENOUS ONCE AS NEEDED
Status: DISCONTINUED | OUTPATIENT
Start: 2024-08-09 | End: 2024-08-09 | Stop reason: HOSPADM

## 2024-08-09 RX ORDER — SODIUM CHLORIDE 0.9 % (FLUSH) 0.9 %
10 SYRINGE (ML) INJECTION EVERY 12 HOURS SCHEDULED
Status: DISCONTINUED | OUTPATIENT
Start: 2024-08-09 | End: 2024-08-13 | Stop reason: HOSPADM

## 2024-08-09 RX ORDER — SODIUM CHLORIDE 0.9 % (FLUSH) 0.9 %
20 SYRINGE (ML) INJECTION AS NEEDED
Status: DISCONTINUED | OUTPATIENT
Start: 2024-08-09 | End: 2024-08-13 | Stop reason: HOSPADM

## 2024-08-09 RX ORDER — HYDROCODONE BITARTRATE AND ACETAMINOPHEN 5; 325 MG/1; MG/1
1 TABLET ORAL ONCE AS NEEDED
Status: DISCONTINUED | OUTPATIENT
Start: 2024-08-09 | End: 2024-08-09 | Stop reason: HOSPADM

## 2024-08-09 RX ORDER — LABETALOL HYDROCHLORIDE 5 MG/ML
5 INJECTION, SOLUTION INTRAVENOUS
Status: DISCONTINUED | OUTPATIENT
Start: 2024-08-09 | End: 2024-08-09 | Stop reason: HOSPADM

## 2024-08-09 RX ORDER — HYDROMORPHONE HYDROCHLORIDE 1 MG/ML
0.5 INJECTION, SOLUTION INTRAMUSCULAR; INTRAVENOUS; SUBCUTANEOUS
Status: DISCONTINUED | OUTPATIENT
Start: 2024-08-09 | End: 2024-08-09 | Stop reason: HOSPADM

## 2024-08-09 RX ORDER — LIDOCAINE HYDROCHLORIDE 20 MG/ML
INJECTION, SOLUTION INFILTRATION; PERINEURAL AS NEEDED
Status: DISCONTINUED | OUTPATIENT
Start: 2024-08-09 | End: 2024-08-09 | Stop reason: SURG

## 2024-08-09 RX ORDER — LIDOCAINE HYDROCHLORIDE 10 MG/ML
0.5 INJECTION, SOLUTION INFILTRATION; PERINEURAL ONCE AS NEEDED
Status: DISCONTINUED | OUTPATIENT
Start: 2024-08-09 | End: 2024-08-09 | Stop reason: HOSPADM

## 2024-08-09 RX ORDER — NICOTINE POLACRILEX 4 MG
15 LOZENGE BUCCAL
Status: DISCONTINUED | OUTPATIENT
Start: 2024-08-09 | End: 2024-08-13 | Stop reason: HOSPADM

## 2024-08-09 RX ORDER — ONDANSETRON 2 MG/ML
4 INJECTION INTRAMUSCULAR; INTRAVENOUS ONCE AS NEEDED
Status: COMPLETED | OUTPATIENT
Start: 2024-08-09 | End: 2024-08-09

## 2024-08-09 RX ORDER — SODIUM CHLORIDE 9 MG/ML
40 INJECTION, SOLUTION INTRAVENOUS AS NEEDED
Status: DISCONTINUED | OUTPATIENT
Start: 2024-08-09 | End: 2024-08-13 | Stop reason: HOSPADM

## 2024-08-09 RX ORDER — DEXMEDETOMIDINE HYDROCHLORIDE 100 UG/ML
INJECTION, SOLUTION INTRAVENOUS AS NEEDED
Status: DISCONTINUED | OUTPATIENT
Start: 2024-08-09 | End: 2024-08-09 | Stop reason: SURG

## 2024-08-09 RX ORDER — DROPERIDOL 2.5 MG/ML
0.62 INJECTION, SOLUTION INTRAMUSCULAR; INTRAVENOUS
Status: DISCONTINUED | OUTPATIENT
Start: 2024-08-09 | End: 2024-08-09 | Stop reason: HOSPADM

## 2024-08-09 RX ORDER — PROPOFOL 10 MG/ML
INJECTION, EMULSION INTRAVENOUS AS NEEDED
Status: DISCONTINUED | OUTPATIENT
Start: 2024-08-09 | End: 2024-08-09 | Stop reason: SURG

## 2024-08-09 RX ORDER — PROMETHAZINE HYDROCHLORIDE 25 MG/1
25 TABLET ORAL ONCE AS NEEDED
Status: DISCONTINUED | OUTPATIENT
Start: 2024-08-09 | End: 2024-08-09 | Stop reason: HOSPADM

## 2024-08-09 RX ORDER — DEXTROSE MONOHYDRATE 25 G/50ML
25 INJECTION, SOLUTION INTRAVENOUS
Status: DISCONTINUED | OUTPATIENT
Start: 2024-08-09 | End: 2024-08-13 | Stop reason: HOSPADM

## 2024-08-09 RX ORDER — HYDRALAZINE HYDROCHLORIDE 20 MG/ML
5 INJECTION INTRAMUSCULAR; INTRAVENOUS
Status: DISCONTINUED | OUTPATIENT
Start: 2024-08-09 | End: 2024-08-09 | Stop reason: HOSPADM

## 2024-08-09 RX ORDER — NALOXONE HCL 0.4 MG/ML
0.2 VIAL (ML) INJECTION AS NEEDED
Status: DISCONTINUED | OUTPATIENT
Start: 2024-08-09 | End: 2024-08-09 | Stop reason: HOSPADM

## 2024-08-09 RX ORDER — FENTANYL CITRATE 50 UG/ML
50 INJECTION, SOLUTION INTRAMUSCULAR; INTRAVENOUS ONCE AS NEEDED
Status: COMPLETED | OUTPATIENT
Start: 2024-08-09 | End: 2024-08-09

## 2024-08-09 RX ORDER — FLUMAZENIL 0.1 MG/ML
0.2 INJECTION INTRAVENOUS AS NEEDED
Status: DISCONTINUED | OUTPATIENT
Start: 2024-08-09 | End: 2024-08-09 | Stop reason: HOSPADM

## 2024-08-09 RX ORDER — IPRATROPIUM BROMIDE AND ALBUTEROL SULFATE 2.5; .5 MG/3ML; MG/3ML
3 SOLUTION RESPIRATORY (INHALATION) ONCE AS NEEDED
Status: DISCONTINUED | OUTPATIENT
Start: 2024-08-09 | End: 2024-08-09 | Stop reason: HOSPADM

## 2024-08-09 RX ORDER — FENTANYL CITRATE 50 UG/ML
INJECTION, SOLUTION INTRAMUSCULAR; INTRAVENOUS AS NEEDED
Status: DISCONTINUED | OUTPATIENT
Start: 2024-08-09 | End: 2024-08-09 | Stop reason: SURG

## 2024-08-09 RX ORDER — SODIUM CHLORIDE 0.9 % (FLUSH) 0.9 %
10 SYRINGE (ML) INJECTION AS NEEDED
Status: DISCONTINUED | OUTPATIENT
Start: 2024-08-09 | End: 2024-08-13 | Stop reason: HOSPADM

## 2024-08-09 RX ORDER — FENTANYL/ROPIVACAINE/NS/PF 2-625MCG/1
15 PLASTIC BAG, INJECTION (ML) EPIDURAL ONCE
Status: CANCELLED | OUTPATIENT
Start: 2024-08-09 | End: 2024-08-09

## 2024-08-09 RX ORDER — SODIUM CHLORIDE 0.9 % (FLUSH) 0.9 %
3 SYRINGE (ML) INJECTION EVERY 12 HOURS SCHEDULED
Status: DISCONTINUED | OUTPATIENT
Start: 2024-08-09 | End: 2024-08-09 | Stop reason: HOSPADM

## 2024-08-09 RX ORDER — SODIUM CHLORIDE 0.9 % (FLUSH) 0.9 %
3-10 SYRINGE (ML) INJECTION AS NEEDED
Status: DISCONTINUED | OUTPATIENT
Start: 2024-08-09 | End: 2024-08-09 | Stop reason: HOSPADM

## 2024-08-09 RX ORDER — FAMOTIDINE 10 MG/ML
20 INJECTION, SOLUTION INTRAVENOUS ONCE
Status: COMPLETED | OUTPATIENT
Start: 2024-08-09 | End: 2024-08-09

## 2024-08-09 RX ORDER — ONDANSETRON 2 MG/ML
INJECTION INTRAMUSCULAR; INTRAVENOUS AS NEEDED
Status: DISCONTINUED | OUTPATIENT
Start: 2024-08-09 | End: 2024-08-09 | Stop reason: SURG

## 2024-08-09 RX ORDER — MIDAZOLAM HYDROCHLORIDE 1 MG/ML
1 INJECTION INTRAMUSCULAR; INTRAVENOUS
Status: DISCONTINUED | OUTPATIENT
Start: 2024-08-09 | End: 2024-08-09 | Stop reason: HOSPADM

## 2024-08-09 RX ORDER — OXYCODONE AND ACETAMINOPHEN 7.5; 325 MG/1; MG/1
1 TABLET ORAL EVERY 4 HOURS PRN
Status: DISCONTINUED | OUTPATIENT
Start: 2024-08-09 | End: 2024-08-09 | Stop reason: HOSPADM

## 2024-08-09 RX ORDER — FENTANYL CITRATE 50 UG/ML
50 INJECTION, SOLUTION INTRAMUSCULAR; INTRAVENOUS
Status: DISCONTINUED | OUTPATIENT
Start: 2024-08-09 | End: 2024-08-09 | Stop reason: HOSPADM

## 2024-08-09 RX ORDER — INSULIN LISPRO 100 [IU]/ML
2-7 INJECTION, SOLUTION INTRAVENOUS; SUBCUTANEOUS
Status: DISCONTINUED | OUTPATIENT
Start: 2024-08-09 | End: 2024-08-13 | Stop reason: HOSPADM

## 2024-08-09 RX ORDER — DIPHENHYDRAMINE HYDROCHLORIDE 50 MG/ML
12.5 INJECTION INTRAMUSCULAR; INTRAVENOUS
Status: DISCONTINUED | OUTPATIENT
Start: 2024-08-09 | End: 2024-08-09 | Stop reason: HOSPADM

## 2024-08-09 RX ORDER — SODIUM CHLORIDE, SODIUM LACTATE, POTASSIUM CHLORIDE, CALCIUM CHLORIDE 600; 310; 30; 20 MG/100ML; MG/100ML; MG/100ML; MG/100ML
9 INJECTION, SOLUTION INTRAVENOUS CONTINUOUS
Status: DISCONTINUED | OUTPATIENT
Start: 2024-08-09 | End: 2024-08-13 | Stop reason: HOSPADM

## 2024-08-09 RX ADMIN — DEXMEDETOMIDINE HYDROCHLORIDE 2 MCG: 100 INJECTION, SOLUTION INTRAVENOUS at 08:16

## 2024-08-09 RX ADMIN — HYDROMORPHONE HYDROCHLORIDE 0.5 MG: 1 INJECTION, SOLUTION INTRAMUSCULAR; INTRAVENOUS; SUBCUTANEOUS at 15:32

## 2024-08-09 RX ADMIN — PROPOFOL 200 MG: 10 INJECTION, EMULSION INTRAVENOUS at 07:39

## 2024-08-09 RX ADMIN — DEXMEDETOMIDINE HYDROCHLORIDE 4 MCG: 100 INJECTION, SOLUTION INTRAVENOUS at 08:20

## 2024-08-09 RX ADMIN — SODIUM CHLORIDE, POTASSIUM CHLORIDE, SODIUM LACTATE AND CALCIUM CHLORIDE 9 ML/HR: 600; 310; 30; 20 INJECTION, SOLUTION INTRAVENOUS at 07:23

## 2024-08-09 RX ADMIN — SODIUM CHLORIDE 700 ML: 9 INJECTION, SOLUTION INTRAVENOUS at 08:32

## 2024-08-09 RX ADMIN — INSULIN GLARGINE 15 UNITS: 100 INJECTION, SOLUTION SUBCUTANEOUS at 21:13

## 2024-08-09 RX ADMIN — OXYCODONE HYDROCHLORIDE 5 MG: 5 TABLET ORAL at 13:38

## 2024-08-09 RX ADMIN — HYDROMORPHONE HYDROCHLORIDE 0.5 MG: 1 INJECTION, SOLUTION INTRAMUSCULAR; INTRAVENOUS; SUBCUTANEOUS at 19:38

## 2024-08-09 RX ADMIN — VANCOMYCIN HYDROCHLORIDE 1750 MG: 10 INJECTION, POWDER, LYOPHILIZED, FOR SOLUTION INTRAVENOUS at 16:45

## 2024-08-09 RX ADMIN — ONDANSETRON 4 MG: 2 INJECTION INTRAMUSCULAR; INTRAVENOUS at 07:50

## 2024-08-09 RX ADMIN — Medication 10 ML: at 21:00

## 2024-08-09 RX ADMIN — OXYCODONE HYDROCHLORIDE 5 MG: 5 TABLET ORAL at 18:13

## 2024-08-09 RX ADMIN — Medication 20 MG: at 08:00

## 2024-08-09 RX ADMIN — DEXMEDETOMIDINE HYDROCHLORIDE 4 MCG: 100 INJECTION, SOLUTION INTRAVENOUS at 08:29

## 2024-08-09 RX ADMIN — LIDOCAINE HYDROCHLORIDE 100 MG: 20 INJECTION, SOLUTION INFILTRATION; PERINEURAL at 07:39

## 2024-08-09 RX ADMIN — HYDROMORPHONE HYDROCHLORIDE 0.5 MG: 1 INJECTION, SOLUTION INTRAMUSCULAR; INTRAVENOUS; SUBCUTANEOUS at 23:01

## 2024-08-09 RX ADMIN — INSULIN LISPRO 3 UNITS: 100 INJECTION, SOLUTION INTRAVENOUS; SUBCUTANEOUS at 01:33

## 2024-08-09 RX ADMIN — DEXMEDETOMIDINE HYDROCHLORIDE 2 MCG: 100 INJECTION, SOLUTION INTRAVENOUS at 08:24

## 2024-08-09 RX ADMIN — HYDROMORPHONE HYDROCHLORIDE 0.5 MG: 1 INJECTION, SOLUTION INTRAMUSCULAR; INTRAVENOUS; SUBCUTANEOUS at 09:05

## 2024-08-09 RX ADMIN — FENTANYL CITRATE 50 MCG: 50 INJECTION, SOLUTION INTRAMUSCULAR; INTRAVENOUS at 07:33

## 2024-08-09 RX ADMIN — HYDROMORPHONE HYDROCHLORIDE 0.5 MG: 1 INJECTION, SOLUTION INTRAMUSCULAR; INTRAVENOUS; SUBCUTANEOUS at 03:28

## 2024-08-09 RX ADMIN — FENTANYL CITRATE 50 MCG: 50 INJECTION, SOLUTION INTRAMUSCULAR; INTRAVENOUS at 06:43

## 2024-08-09 RX ADMIN — OXYCODONE HYDROCHLORIDE 5 MG: 5 TABLET ORAL at 22:06

## 2024-08-09 RX ADMIN — DEXMEDETOMIDINE HYDROCHLORIDE 6 MCG: 100 INJECTION, SOLUTION INTRAVENOUS at 08:35

## 2024-08-09 RX ADMIN — INSULIN LISPRO 3 UNITS: 100 INJECTION, SOLUTION INTRAVENOUS; SUBCUTANEOUS at 21:14

## 2024-08-09 RX ADMIN — FAMOTIDINE 20 MG: 10 INJECTION INTRAVENOUS at 06:43

## 2024-08-09 RX ADMIN — SODIUM CHLORIDE 9 ML/HR: 9 INJECTION, SOLUTION INTRAVENOUS at 07:33

## 2024-08-09 RX ADMIN — OXYCODONE HYDROCHLORIDE 5 MG: 5 TABLET ORAL at 09:36

## 2024-08-09 RX ADMIN — HYDROMORPHONE HYDROCHLORIDE 0.5 MG: 1 INJECTION, SOLUTION INTRAMUSCULAR; INTRAVENOUS; SUBCUTANEOUS at 07:45

## 2024-08-09 RX ADMIN — Medication 3 ML: at 06:45

## 2024-08-09 RX ADMIN — INSULIN LISPRO 3 UNITS: 100 INJECTION, SOLUTION INTRAVENOUS; SUBCUTANEOUS at 16:45

## 2024-08-09 RX ADMIN — HYDROMORPHONE HYDROCHLORIDE 0.5 MG: 1 INJECTION, SOLUTION INTRAMUSCULAR; INTRAVENOUS; SUBCUTANEOUS at 12:28

## 2024-08-09 RX ADMIN — OXYCODONE AND ACETAMINOPHEN 1 TABLET: 7.5; 325 TABLET ORAL at 08:54

## 2024-08-09 RX ADMIN — FENTANYL CITRATE 50 MCG: 50 INJECTION, SOLUTION INTRAMUSCULAR; INTRAVENOUS at 08:53

## 2024-08-09 RX ADMIN — ONDANSETRON 6 MG: 2 INJECTION INTRAMUSCULAR; INTRAVENOUS at 08:23

## 2024-08-09 RX ADMIN — VANCOMYCIN HYDROCHLORIDE 1750 MG: 10 INJECTION, POWDER, LYOPHILIZED, FOR SOLUTION INTRAVENOUS at 05:13

## 2024-08-09 RX ADMIN — HYDROMORPHONE HYDROCHLORIDE 0.5 MG: 1 INJECTION, SOLUTION INTRAMUSCULAR; INTRAVENOUS; SUBCUTANEOUS at 07:53

## 2024-08-09 RX ADMIN — ONDANSETRON 4 MG: 2 INJECTION, SOLUTION INTRAMUSCULAR; INTRAVENOUS at 08:54

## 2024-08-09 NOTE — SIGNIFICANT NOTE
08/09/24 0632   Patient Belongings:   Patient Belongings  Clothing;Jewelry   Jewelry Ring   Jewelry sent to With Patient/ At Bedside/On Stretcher   Clothing Pants   Clothing sent to With Patient/ At Bedside/On Stretcher     Patient has refused to removed shorts. AA notified and they are ok with it.

## 2024-08-09 NOTE — ANESTHESIA PROCEDURE NOTES
Airway  Urgency: elective    Date/Time: 8/9/2024 7:40 AM  End Time:8/9/2024 7:41 AM  Airway not difficult    General Information and Staff    Patient location during procedure: OR  Anesthesiologist: Shashi Cobian MD  CRNA/CAA: Keysha Ward CRNA    Indications and Patient Condition  Indications for airway management: airway protection    Preoxygenated: yes  MILS maintained throughout  Mask difficulty assessment: 0 - not attempted    Final Airway Details  Final airway type: supraglottic airway      Successful airway: unique  Size 4     Number of attempts at approach: 1  Assessment: lips, teeth, and gum same as pre-op and atraumatic intubation

## 2024-08-09 NOTE — H&P
[facility]   PREOPERATIVE HISTORY AND PHYSICAL    Patient Name:Shayy Ellison  : 1980  MRN: [id number]  Primary Care Physician: Provider, No Known  Date of admission: 2024    Subjective   Subjective     Chief Complaint: preoperative evaluation    Foot Pain      Shayy Ellison is a 43 y.o. male who presents for preoperative evaluation. He is scheduled for Right foot wound debridement and delayed primary wound closure (Right)    Review of Systems     Personal History     Past Medical History:   Diagnosis Date    Asthma     Diabetes        Past Surgical History:   Procedure Laterality Date    ANKLE SURGERY Left 2005    BONE BIOPSY LEG Right 2024    Procedure: Right fifth metatarsal bone biopsy;  Surgeon: Alexander Freeman DPM;  Location: [facility];  Service: Podiatry;  Laterality: Right;    INCISION AND DRAINAGE LEG Right 2024    Procedure: Right foot incision and drainage;  Surgeon: Alexander Freeman DPM;  Location: [facility];  Service: Podiatry;  Laterality: Right;       Family History: His family history includes Diabetes in his father and mother; Hypertension in his father and mother; Lung cancer in his father; No Known Problems in his brother.     Social History: He  reports that he has been smoking cigarettes. He has a 22.5 pack-year smoking history. He has never been exposed to tobacco smoke. He has never used smokeless tobacco. He reports that he does not currently use alcohol. He reports that he does not use drugs.    Home Medications:  FreeStyle Rochelle 14 Day [place]    Allergies:  He is allergic to pork-derived products and latex.    Objective    Objective     Vitals:    Temp:  [97.7 °F (36.5 °C)-98.4 °F (36.9 °C)] 98.4 °F (36.9 °C)  Heart Rate:  [85-96] 88  Resp:  [16-20] 16  BP: (116-140)/(68-87) 140/84    Physical Exam      Dermatology: Skin texture and turgor normal.  Dressing left in place this morning to right foot, no edema or streaking seen proximal to bandage.     Vascular:  DP and PT pulses are palpable     Neurological: Light touch and protective sensation are diminished to the right foot     Musckuloskeletal: 5 out of 5 muscle strength all compartments, pain to palpation of the lateral forefoot.    Assessment & Plan   Assessment / Plan     Brief Patient Summary:  Shayy Ellsion is a 43 y.o. male who presents for preoperative evaluation.    Pre-Op Diagnosis Codes:     * Cellulitis of right foot [L03.115]     * Abscess of right foot [L02.611]     * Diabetic foot infection [E11.628, L08.9]    Active Hospital Problems:  Active Hospital Problems    Diagnosis     **Soft tissue infection     Sepsis     Diabetic foot infection     Cellulitis of right foot     Abscess of right foot      Plan:   Procedure(s):  Right foot wound debridement and delayed primary wound closure    The risks, benefits, and alternatives of the procedure including but not limited to bleeding, infection, nerve injury, need for more surgery, loss of limb and risks of the anesthesia were discussed in detail with the patient and questions were answered. No guarantees were made or implied. Informed consent was obtained.    Alexander Freeman DPM

## 2024-08-09 NOTE — OP NOTE
Operative Report    Patient: Shayy Ellison     Date: 08/09/24     MRN: [id number]       SURGEON: Alexander Freeman DPM     ASSISTANT: None    PROCEDURE: Right foot wound debridement and delayed primary wound closure    PRE-OPERATIVE DIAGNOSIS: Right foot abscess and cellulitis    POST-OPERATIVE DIAGNOSIS: Same    ANAESTHESIA: LMA with local    HEMOSTASIS: No tourniquet used    ESTIMATED BLOOD LOSS: 10 cc    SPECIMEN: None    IMPLANTS: None    COMPLICATIONS: None    INDICATION FOR PROCEDURE: Patient had extensive cellulitis and abscess to the right foot.  He underwent incision and drainage 2 days ago and the surgical wound was left open to drain.  The plan for today is debridement, washout and delayed primary wound closure.    Consent was obtained pre-operatively. All questions were answered, risks versus benefits were discussed at length. No guarantees or assurances were given or implied.    PROCEDURE: Patient was brought to the operating room and placed on the operative table in supine position. A surgical timeout was performed and then patients name, date of birth, procedure and surgical site were all verified. A local block of 20 cc of a one-to-one mix of 1% lidocaine plain and 0.5% Marcaine plain was injected to the surgical site.  The right lower extremity was then scrubbed, prepped and draped in the usual sterile manner.     Attention was directed to the right plantar lateral midfoot area where there is a surgical wound noted.  The area was extensively debrided once again with rongeur and sharp debridement using 15 blade.  All nonviable tissue was resected.  There is very minimal necrotic tissue at this point, and there was no purulence noted.  All the bleeders were ligated and cauterized as necessary.  The area was copiously irrigated with sterile saline solution.  Wound closure was obtained using 2 oh and 3 oh coated Vicryl and 3-0 nylon for skin closure.  The patient tolerated the procedure and anesthesia well.   The patient was transferred from the operating room to the recovery room with vital signs stable and vascular status intact to the right foot    Alexander Freeman DPM

## 2024-08-09 NOTE — PLAN OF CARE
Goal Outcome Evaluation: Wound debrided in OR today, pt taking prn pain medication around the clock, anxious/irritable affect but cooperative. DM education ordered for better management at home. Transfer orders for floor - awaiting open bed.

## 2024-08-09 NOTE — ANESTHESIA PREPROCEDURE EVALUATION
Anesthesia Evaluation     Patient summary reviewed and Nursing notes reviewed   NPO Solid Status: > 8 hours  NPO Liquid Status: > 2 hours           Airway   Mallampati: II  TM distance: >3 FB  Neck ROM: full  Dental - normal exam     Pulmonary    (+) a smoker Current, asthma,  (-) decreased breath sounds, wheezes  Cardiovascular - normal exam  Exercise tolerance: good (4-7 METS)    (-) hypertension      Neuro/Psych- negative ROS  (-) seizures, CVA  GI/Hepatic/Renal/Endo    (+) diabetes mellitus poorly controlled    Musculoskeletal (-) negative ROS    Abdominal    Substance History - negative use  (-) alcohol use, drug use     OB/GYN negative ob/gyn ROS         Other - negative ROS                   Anesthesia Plan    ASA 3     MAC       Anesthetic plan, risks, benefits, and alternatives have been provided, discussed and informed consent has been obtained with: patient.    CODE STATUS:    Level Of Support Discussed With: Patient  Code Status (Patient has no pulse and is not breathing): CPR (Attempt to Resuscitate)  Medical Interventions (Patient has pulse or is breathing): Full Support

## 2024-08-09 NOTE — PROGRESS NOTES
Name: Shayy Ellison ADMIT: 2024   : 1980  PCP: Provider, No Known    MRN: [id number] LOS: 3 days   AGE/SEX: 43 y.o. male  ROOM: [facility]     Subjective     Off insulin drip now.  Doing relatively well.  He went back to the OR today.    Objective   Objective   Vital Signs  Temp:  [97.9 °F (36.6 °C)-98.4 °F (36.9 °C)] 98.1 °F (36.7 °C)  Heart Rate:  [78-96] 84  Resp:  [16-20] 18  BP: (105-140)/(68-87) 120/78  SpO2:  [94 %-98 %] 94 %  on  Flow (L/min):  [2-4] 2;   Device (Oxygen Therapy): room air  Body mass index is 26.12 kg/m².  Physical Exam  Constitutional:       General: He is not in acute distress.  HENT:      Head: Normocephalic and atraumatic.   Eyes:      Extraocular Movements: Extraocular movements intact.      Pupils: Pupils are equal, round, and reactive to light.   Cardiovascular:      Rate and Rhythm: Normal rate and regular rhythm.   Pulmonary:      Effort: Pulmonary effort is normal. No respiratory distress.   Abdominal:      General: There is no distension.      Palpations: Abdomen is soft.      Tenderness: There is no abdominal tenderness.   Musculoskeletal:         General: No swelling. Normal range of motion.      Comments: The right foot is bandaged   Skin:     General: Skin is warm and dry.   Neurological:      Mental Status: He is alert and oriented to person, place, and time.      Cranial Nerves: No cranial nerve deficit.      Comments: lethargic           Results Review     I reviewed the patient's new clinical results.  Results from last 7 days   Lab Units 24  0426 24  0501 24  1938 24  0500   WBC 10*3/mm3 11.29* 17.27* 22.19* 19.88*   HEMOGLOBIN g/dL 10.4* 10.4* 11.1* 11.3*   PLATELETS 10*3/mm3 383 383 440 439     Results from last 7 days   Lab Units 24  0426 24  2018 24  1656 24  1311   SODIUM mmol/L 134* 135* 134* 133*   POTASSIUM mmol/L 3.7 3.7 4.0 3.8   CHLORIDE mmol/L 106 104 101 99   CO2 mmol/L 20.2* 17.0* 16.1* 15.3*   BUN  "mg/dL 6 7 8 8   CREATININE mg/dL 0.62* 0.59* 0.72* 0.72*   GLUCOSE mg/dL 98 160* 169* 148*   Estimated Creatinine Clearance: 175.6 mL/min (A) (by C-G formula based on SCr of 0.62 mg/dL (L)).  Results from last 7 days   Lab Units 08/08/24  0501 08/07/24 1938 08/05/24  1709   ALBUMIN g/dL 3.0* 3.4* 3.6   BILIRUBIN mg/dL 0.6 0.7 0.5   ALK PHOS U/L 79 92 86   AST (SGOT) U/L 6 11 6   ALT (SGPT) U/L 7 6 7     Results from last 7 days   Lab Units 08/09/24  0426 08/08/24 2018 08/08/24  1656 08/08/24  1311 08/08/24  0835 08/08/24  0501 08/08/24  0051 08/07/24 1938 08/06/24  1037 08/05/24  1709   CALCIUM mg/dL 8.7 8.5* 8.7 8.9 7.7* 9.0  9.0   < > 9.1  9.1   < > 9.2   ALBUMIN g/dL  --   --   --   --   --  3.0*  --  3.4*  --  3.6   MAGNESIUM mg/dL  --  1.8 1.7 1.7 1.6 1.7   < > 1.6  1.6  --   --    PHOSPHORUS mg/dL  --  2.2* 2.4* 2.7 2.9 3.4   < > 4.1  4.3  --   --     < > = values in this interval not displayed.     Results from last 7 days   Lab Units 08/07/24 1938 08/05/24  1709   LACTATE mmol/L 0.7 1.4   No results found for: \"COVID19\"  Hemoglobin A1C   Date/Time Value Ref Range Status   08/07/2024 1938 11.80 (H) 4.80 - 5.60 % Final     Glucose   Date/Time Value Ref Range Status   08/09/2024 1211 147 (H) 70 - 130 mg/dL Final   08/09/2024 1001 166 (H) 70 - 130 mg/dL Final   08/09/2024 0839 132 (H) 70 - 130 mg/dL Final   08/09/2024 0609 115 70 - 130 mg/dL Final   08/09/2024 0426 98 70 - 130 mg/dL Final   08/09/2024 0119 250 (H) 70 - 130 mg/dL Final   08/09/2024 0002 229 (H) 70 - 130 mg/dL Final     Results for orders placed or performed during the hospital encounter of 08/05/24   Blood Culture - Blood, Arm, Left    Specimen: Arm, Left; Blood   Result Value Ref Range    Blood Culture No growth at 3 days          XR Chest 1 View  Narrative: XR CHEST 1 VW-     HISTORY: Male who is 43 years-old, possible fluid overload     TECHNIQUE: Frontal view of the chest     COMPARISON: None available     FINDINGS: Heart, mediastinum " and pulmonary vasculature are unremarkable.  Small atelectasis or scarring at the left base. No focal pulmonary  consolidation, pleural effusion, or pneumothorax. No acute osseous  process.     Impression: Small likely scarring or atelectasis at the left base. No  focal pulmonary consolidation or edema. Follow-up as clinical  indications persist.     This report was finalized on 8/7/2024 7:38 PM by Dr. Donnell Alfredo M.D on Workstation: [email or web address]       Scheduled Medications  insulin glargine, 1-200 Units, Subcutaneous, Nightly - Glucommander  insulin lispro, 1-200 Units, Subcutaneous, Q4H  nicotine, 1 patch, Transdermal, Q24H  sertraline, 50 mg, Oral, Daily  vancomycin, 1,750 mg, Intravenous, Q12H    Infusions  lactated ringers, 9 mL/hr, Last Rate: 9 mL/hr (08/07/24 0725)  lactated ringers, 9 mL/hr, Last Rate: 9 mL/hr (08/09/24 0723)  Pharmacy to dose vancomycin,     Diet  Diet: Regular/House; Fluid Consistency: Thin (IDDSI 0)       Assessment/Plan     Active Hospital Problems    Diagnosis  POA    **Soft tissue infection [L08.9]  Yes    Sepsis [A41.9]  Unknown    Diabetic foot infection [E11.628, L08.9]  Yes    Cellulitis of right foot [L03.115]  Unknown    Abscess of right foot [L02.611]  Unknown      Resolved Hospital Problems   No resolved problems to display.       43 y.o. male admitted with Soft tissue infection.    Diabetic ketoacidosis  Resolved.    Type 1 diabetes  A1c is 12.5.  He will require insulin at discharge.  He also reports that he does not have a PCP. Discontinue glucommander and start glargine 15u qhs and monitor blood sugars.  I would like to start him off with basal insulin regimen as he starts off as he does not have a primary care provider to make adjustments.     Osteomyelitis  Right foot abscess  Wcx with MRSA. On Vancomycin. Cefepime discontinued. .  Status post I&D along with biopsy of right fifth metatarsal.  Being treated for acute osteomyelitis with 4 weeks of  vancomycin.        Andrea Pandya MD  [facility]  08/09/24  12:38 EDT

## 2024-08-09 NOTE — PROGRESS NOTES
"[facility] Clinical [facility]acy Services: Vancomycin Monitoring Note    Shayy Ellison is a 43 y.o. male who is on day 4/7 of pharmacy to dose vancomycin for Skin and Soft Tissue. Dr. Saenz is following.    Previous Vancomycin Dose:    1750 mg IV every  12  hours  Updated Cultures and Sensitivities:   -8/7 surgical site rt foot:  3+ MRSA    Results from last 7 days   Lab Units 08/09/24  1539 08/07/24  1345   VANCOMYCIN TR mcg/mL 8.90 5.60     Vitals/Labs  Ht: 175 cm (68.9\"); Wt: 80 kg (176 lb 5.9 oz)   Temp Readings from Last 1 Encounters:   08/09/24 98.3 °F (36.8 °C) (Oral)     Estimated Creatinine Clearance: 175.6 mL/min (A) (by C-G formula based on SCr of 0.62 mg/dL (L)).       Results from last 7 days   Lab Units 08/09/24  0426 08/08/24  2018 08/08/24  1656 08/08/24  0835 08/08/24  0501 08/08/24  0051 08/07/24  1938   CREATININE mg/dL 0.62* 0.59* 0.72*   < > 0.76  0.76   < > 0.79  0.79   WBC 10*3/mm3 11.29*  --   --   --  17.27*  --  22.19*    < > = values in this interval not displayed.     Assessment/Plan    -s/p surgical debridement this am.   Vancomycin AUC at goal but trough is less than 10.  Will slightly tweak dosing to keep an -600 and keep the trough > 10 in an effort to prevent development of resistane.    Current Vancomycin Dose: 1250 mg IV every  8  hours; provides a predicted  mg/L.hr   Next Level Date and Time: trough 8/11 0730  We will continue to monitor patient changes and renal function     Thank you for involving pharmacy in this patient's care. Please contact pharmacy with any questions or concerns.       Oziel Boland III, [facility]  Clinical Pharmacist              "

## 2024-08-09 NOTE — PROGRESS NOTES
Pulm/CC note    Patient qualifies for discontinuation of DKA protocol with serum bicarbonate 17.0, blood glucose 160, venous pH 7.46.  Transitioned to subcutaneous insulin per Glucomander protocol-insulin scheduled every 4 hours given patient is going to be n.p.o. at midnight.

## 2024-08-09 NOTE — PROGRESS NOTES
Infectious diseases progress note     chief complaint: Follow-up R foot abscess and acute osteomyelitis due to MRSA    Subjective: No acute events. No fever. He went back to OR today for second look and closure. Per op note, looked much better. Cultures grew MRSA      Medications:    Current Facility-Administered Medications:     acetaminophen (TYLENOL) tablet 1,000 mg, 1,000 mg, Oral, Q8H PRN, Alexander Freeman, DPM, 1,000 mg at 08/08/24 2126    sennosides-docusate (PERICOLACE) 8.6-50 MG per tablet 2 tablet, 2 tablet, Oral, BID PRN **AND** polyethylene glycol (MIRALAX) packet 17 g, 17 g, Oral, Daily PRN **AND** bisacodyl (DULCOLAX) EC tablet 5 mg, 5 mg, Oral, Daily PRN **AND** bisacodyl (DULCOLAX) suppository 10 mg, 10 mg, Rectal, Daily PRN, Alexander Freeman, DPM    Calcium Replacement - Follow Nurse / BPA Driven Protocol, , Does not apply, PRN, Alexander Freeman, DPM    dextrose (D50W) (25 g/50 mL) IV injection 10-50 mL, 10-50 mL, Intravenous, Q15 Min PRN, Alexander Freeman, DPM    dextrose (GLUTOSE) oral gel 15 g, 15 g, Oral, Q15 Min PRN, Alexander Freeman, DPM    glucagon (GLUCAGEN) injection 1 mg, 1 mg, Intramuscular, Q15 Min PRN, Alexander Freeman, DPM    HYDROmorphone (DILAUDID) injection 0.5 mg, 0.5 mg, Intravenous, Q3H PRN, Alexander Freeman, DPM, 0.5 mg at 08/09/24 0328    ibuprofen (ADVIL,MOTRIN) tablet 600 mg, 600 mg, Oral, Q6H PRN, Alexander Freeman, DPM, 600 mg at 08/06/24 1223    insulin glargine (LANTUS, SEMGLEE) injection 1-200 Units, 1-200 Units, Subcutaneous, Nightly - Glucommander, Alexander Freeman DPM, 24 Units at 08/08/24 2225    insulin lispro (HUMALOG/ADMELOG) injection 1-200 Units, 1-200 Units, Subcutaneous, Q4H, Alexander Freeman DPM, 3 Units at 08/09/24 0133    insulin lispro (HUMALOG/ADMELOG) injection 1-200 Units, 1-200 Units, Subcutaneous, PRN, Alexander Freeman DPM    lactated ringers infusion, 9 mL/hr, Intravenous, Continuous, Alexander Freeman DPM, Last Rate: 9 mL/hr at 08/07/24 0725,  Restarted at 08/07/24 0734    lactated ringers infusion, 9 mL/hr, Intravenous, Continuous, Alexander Freeman DPM, Last Rate: 9 mL/hr at 08/09/24 0723, 9 mL/hr at 08/09/24 0723    Magnesium Standard Dose Replacement - Follow Nurse / BPA Driven Protocol, , Does not apply, PRN, Lucas Freemanw, DPM    nicotine (NICODERM CQ) 21 MG/24HR patch 1 patch, 1 patch, Transdermal, Q24H, Alexander Freeman, DPM, 1 patch at 08/06/24 1737    ondansetron (ZOFRAN) injection 4 mg, 4 mg, Intravenous, Q6H PRN, Alexander Freeman, DPM, 4 mg at 08/08/24 0337    oxyCODONE (ROXICODONE) immediate release tablet 5 mg, 5 mg, Oral, Q4H PRN, Alexander Freeman, DPM, 5 mg at 08/09/24 0936    Pharmacy to dose vancomycin, , Does not apply, Continuous PRN, RafaeluwLucas zengw, DPM    Phosphorus Replacement - Follow Nurse / BPA Driven Protocol, , Does not apply, PRN, Lucas Freemanw, DPM    Potassium Replacement - Follow Nurse / BPA Driven Protocol, , Does not apply, PRN, Lucas Freemanw, DPM    prochlorperazine (COMPAZINE) injection 5 mg, 5 mg, Intravenous, Q6H PRN, Alexander Freeman, DPM, 5 mg at 08/08/24 2042    sertraline (ZOLOFT) tablet 50 mg, 50 mg, Oral, Daily, RafaeluwkaLucas canadaw, DPM, 50 mg at 08/08/24 0821    [COMPLETED] Insert Peripheral IV, , , Once **AND** sodium chloride 0.9 % flush 10 mL, 10 mL, Intravenous, PRN, RafaeluwkampLucasw, DPM    sodium chloride 0.9 % flush 10 mL, 10 mL, Intravenous, PRN, Bouwkamp, Alexander, DPM    sodium chloride 0.9 % infusion 40 mL, 40 mL, Intravenous, PRN, BouwkaLucas canadaw, DPM, 700 mL at 08/09/24 0832    vancomycin 1750 mg/500 mL 0.9% NS IVPB ([facility]), 1,750 mg, Intravenous, Q12H, Alexander Freeman, DPM, 1,750 mg at 08/09/24 0513      Objective   Vital Signs   Temp:  [97.9 °F (36.6 °C)-98.4 °F (36.9 °C)] 97.9 °F (36.6 °C)  Heart Rate:  [78-96] 83  Resp:  [16-20] 16  BP: (105-140)/(68-87) 120/78    Physical Exam:   General: Awake, alert, lying in bed, no acute distress, in PACU  Eyes: no scleral icterus  Cardiovascular:  "Normal rate  Respiratory: normal work of breathing without wheezing  GI: Abdomen is soft, not tender  Skin: Right foot bandaged    Labs:   CBC, BMP,  blood cultures, and wound cultures reviewed today  Lab Results   Component Value Date    WBC 11.29 (H) 08/09/2024    HGB 10.4 (L) 08/09/2024    HCT 31.7 (L) 08/09/2024    MCV 79.6 08/09/2024     08/09/2024     Lab Results   Component Value Date    GLUCOSE 98 08/09/2024    CALCIUM 8.7 08/09/2024     (L) 08/09/2024    K 3.7 08/09/2024    CO2 20.2 (L) 08/09/2024     08/09/2024    BUN 6 08/09/2024    CREATININE 0.62 (L) 08/09/2024    EGFRRESULT 111.6 12/07/2023    EGFR 122.4 08/09/2024    BCR 9.7 08/09/2024    ANIONGAP 7.8 08/09/2024     Lab Results   Component Value Date    CRP 33.55 (H) 08/08/2024     Lab Results   Component Value Date    HGBA1C 11.80 (H) 08/07/2024     Lab Results   Component Value Date    VANCOTROUGH 5.60 08/07/2024       Microbiology:  8/5 BCx: Negative to date  8/5 MRSA nares PCR: Positive  8/7 right foot operative cultures: MRSA    Prior radiology:  MRI R Foot:   \"1. Plantar-lateral anterior hindfoot, midfoot, proximal forefoot   nonenhancing collection and presumed infected collection/abscess   predominantly centered within the abductor digiting minimi muscle and   extends from the level of the central to anterior calcaneus distally to   the coronal level of the junction of the mid-third and distal-third of   the 5th metatarsal. The collection contacts the plantar aspect of the   peroneus longus tendon where there is presumed infectious tenosynovitis   and there is extensive surrounding myositis. Generalized foot myositis   and diffuse cellulitis.   2. Bone marrow edema and enhancement within the base of the 5th   metatarsal and to a lesser degree within the plantar aspect of the   cuboid due to reactive marrow edema or early osteomyelitis.   3. 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   4. Suspect " "peroneus brevis split tear at the proximal margin of the   field-of-view of this exam. \"    XR R Foot:   \"No acute fracture, erosion, or dislocation is identified. If there is   further clinical concern, MRI or bone scan could be obtained for further   evaluation. A sclerotic focus in the second proximal phalanx, although   nonspecific, may represent bone island.\"    ASSESSMENT/PLAN:  Cellulitis, abscess, tenosynovitis, myositis, and probable early osteomyelitis of the right foot due to MRSA  Uncontrolled type 2 diabetes -last A1c 12.5%  MRSA carrier  Hyponatremia  Elevated C-reactive protein  DKA    On 8/7/2024, he went to the operating room for foot debridement with podiatry.  I reviewed the operative note and the plan tracked all the way down to bone so I am going to treat him as acute osteomyelitis with a 4-week course of antibiotics. Cultures grew MRSA.     Repeat OR visit on 8/8/24 showed significantly improved findings and the wound was closed.     I recommend that he be treated w/ vancomycin 1750 mg IV q12h x 4 weeks with stop date 9/3/24 at which time he will follow-up w/ me in ID clinic. He will need a weekly CBC w/ diff, BMP, VTr, and CRp faxed to me at [phone]. I will order a single lumen PICC today.     Going forward, stricter glucose control will be of the utmost importance to promote healing and decrease risk of future infection.    Thank you for allowing me to be involved in the care of this patient. Infectious diseases will sign off at this time with antibiotics plan in place, but please call me at [phone] if any further ID questions or new ID concerns.    "

## 2024-08-09 NOTE — PROGRESS NOTES
"                                              LOS: 3 days   Patient Care Team:  Provider, No Known as PCP - General    Chief Complaint:  F/up DKA, sepsis, right foot cellulitis/abscess and critical care management    Subjective   Interval History      Blood sugar improved and anion gap closed.  Serum bicarb is within normal as well. Patient is NPO.    REVIEW OF SYSTEMS:   CARDIOVASCULAR: No chest pain, chest pressure or chest discomfort. No palpitations or edema.   RESPIRATORY: No shortness of breath, cough or sputum.   GASTROINTESTINAL: No anorexia, nausea, vomiting or diarrhea. No abdominal pain.  CONSTITUTIONAL: No fever or chills.     Ventilator/Non-Invasive Ventilation Settings (From admission, onward)      None                  Physical Exam:     Vital Signs  Temp:  [97.7 °F (36.5 °C)-98.4 °F (36.9 °C)] 98.4 °F (36.9 °C)  Heart Rate:  [85-96] 88  Resp:  [16-20] 16  BP: (116-140)/(68-87) 140/84    Intake/Output Summary (Last 24 hours) at 8/9/2024 0754  Last data filed at 8/9/2024 0725  Gross per 24 hour   Intake 0 ml   Output 1950 ml   Net -1950 ml     Flowsheet Rows      Flowsheet Row First Filed Value   Admission Height 175.3 cm (69\") Documented at 08/05/2024 1555   Admission Weight 78.5 kg (173 lb) Documented at 08/05/2024 1555            PPE used per hospital policy    General Appearance:   Alert, cooperative, in no acute distress   ENMT:  Mallampati score 3, moist mucous membrane   Eyes:  Pupils equal and reactive to light. EOMI   Neck:    Trachea midline. No thyromegaly.   Lungs:    Clear to auscultation,respirations regular, even and nonlabored    Heart:   Regular rhythm and normal rate, normal S1 and S2, no         murmur   Skin:   No rash or ecchymosis   Abdomen:    Obese. Soft. No tenderness. No HSM.   Neuro/psych:  Conscious, alert, oriented x3. Strength 5/5 in upper and lower  ext.  Appropriate mood and affect   Extremities:  No cyanosis, clubbing or edema.  Warm extremities and well-perfused.  " Right foot wrapped.          Results Review:        Results from last 7 days   Lab Units 08/09/24  0426 08/08/24 2018 08/08/24  1656   SODIUM mmol/L 134* 135* 134*   POTASSIUM mmol/L 3.7 3.7 4.0   CHLORIDE mmol/L 106 104 101   CO2 mmol/L 20.2* 17.0* 16.1*   BUN mg/dL 6 7 8   CREATININE mg/dL 0.62* 0.59* 0.72*   GLUCOSE mg/dL 98 160* 169*   CALCIUM mg/dL 8.7 8.5* 8.7     Results from last 7 days   Lab Units 08/08/24  0051 08/07/24  1938   HSTROP T ng/L 9 8     Results from last 7 days   Lab Units 08/09/24  0426 08/08/24  0501 08/07/24  1938   WBC 10*3/mm3 11.29* 17.27* 22.19*   HEMOGLOBIN g/dL 10.4* 10.4* 11.1*   HEMATOCRIT % 31.7* 31.4* 33.2*   PLATELETS 10*3/mm3 383 383 440                     Results from last 7 days   Lab Units 08/08/24  0501 08/05/24  1709   CRP mg/dL 33.55* 23.66*       Results from last 7 days   Lab Units 08/08/24  2133   MODALITY  Room Air         I reviewed the patient's new clinical results.        Medication Review:   [Transfer Hold] insulin glargine, 1-200 Units, Subcutaneous, Nightly - Glucommander  [Transfer Hold] insulin lispro, 1-200 Units, Subcutaneous, Q4H  [Transfer Hold] nicotine, 1 patch, Transdermal, Q24H  [Transfer Hold] sertraline, 50 mg, Oral, Daily  sodium chloride, 3 mL, Intravenous, Q12H  vancomycin, 1,750 mg, Intravenous, Q12H        lactated ringers, 9 mL/hr, Last Rate: 9 mL/hr (08/07/24 0725)  lactated ringers, 9 mL/hr, Last Rate: 9 mL/hr (08/09/24 0723)  Pharmacy to dose vancomycin,             Assessment     DKA  Right foot cellulitis/abscess s/p I&D 8/8/2024, MRSA  Sepsis, secondary to #2  Uncontrolled diabetes, A1c has been always >10 since 2022.  Latest level on this admission is 12.5  Abnormal imaging of the right metatarsal s/p right fifth metatarsal bone biopsy  Anemia  Tobacco abuse        Plan     Antibiotics with vancomycin.    Debridement of the right foot again today.  Insulin drip discontinued.  Long-acting insulin initiated.  He received 24 units Lantus  last night and will continue insulin sliding scale every 4 hours today while patient is n.p.o. and can be transitioned to AC/HS   Pain management with Tylenol and morphine as needed  Initiate DVT prophylaxis with heparin subcu  Nicotine patch  Diabetes educator    Discussed with ICU staff.    Vin Perry MD  08/09/24  07:54 EDT    Time>35 min face to face and on the duval >1/2 directed toward counseling and coordination of care      This note was dictated utilizing Dragon dictation

## 2024-08-09 NOTE — SIGNIFICANT NOTE
"   08/09/24 1429   PICC Single Lumen 08/09/24 Left Basilic   Placement date: If unknown, DO NOT use \"Add Comment\" note/Placement time: If unknown, DO NOT use \"Add Comment\" note: 08/09/24 1428   Hand Hygiene Completed: Yes  Size (Fr): 4  Description (optional): Lot#[id number] EXP  Length (cm): 44 cm  Orientation: ...   Site Assessment Clean;Dry;Intact   #1 Lumen Status Blood return noted;Capped;Flushed;Normal saline locked   Length maday (cm) 44 cm   Line Care Connections checked and tightened   Extremity Circumference (cm) 28 cm   Dressing Type Border Dressing;Securing device;Antimicrobial dressing/disc   Dressing Status Clean;Dry;Intact   Dressing Intervention New dressing   Dressing Change Due 08/16/24   Indication/Daily Review of Necessity long-term IV access >7 days     Sharp Count Correct 3 needles, 2 guidewires, and 1 scalpel      "

## 2024-08-09 NOTE — ANESTHESIA POSTPROCEDURE EVALUATION
"Patient: Shayy Ekic    Procedure Summary       Date: 08/09/24 Room / Location: [facility] OR [address] [place] [address] MAIN OR    Anesthesia Start: 0730 Anesthesia Stop: 0838    Procedure: Right foot wound debridement and delayed primary wound closure (Right: Ankle) Diagnosis:       Cellulitis of right foot      Abscess of right foot      Diabetic foot infection      (Cellulitis of right foot [L03.115])      (Abscess of right foot [L02.611])      (Diabetic foot infection [E11.628, L08.9])    Surgeons: Alexander Freeman DPM Provider: Shashi Cobian MD    Anesthesia Type: MAC ASA Status: 3            Anesthesia Type: MAC    Vitals  Vitals Value Taken Time   /78 08/09/24 0900   Temp 36.6 °C (97.9 °F) 08/09/24 0836   Pulse 90 08/09/24 0909   Resp 16 08/09/24 0900   SpO2 97 % 08/09/24 0909   Vitals shown include unfiled device data.        Post Anesthesia Care and Evaluation    Patient location during evaluation: bedside  Patient participation: complete - patient participated  Level of consciousness: awake and alert  Pain management: adequate    Airway patency: patent  Anesthetic complications: No anesthetic complications    Cardiovascular status: acceptable  Respiratory status: acceptable  Hydration status: acceptable    Comments: /88   Pulse 106   Temp 36.7 °C (98.1 °F) (Oral)   Resp 18   Ht 175 cm (68.9\")   Wt 80 kg (176 lb 5.9 oz)   SpO2 95%   BMI 26.12 kg/m²     "

## 2024-08-10 LAB
ANION GAP SERPL CALCULATED.3IONS-SCNC: 10.7 MMOL/L (ref 5–15)
BACTERIA SPEC AEROBE CULT: NORMAL
BUN SERPL-MCNC: 5 MG/DL (ref 6–20)
BUN/CREAT SERPL: 9.3 (ref 7–25)
CALCIUM SPEC-SCNC: 8.4 MG/DL (ref 8.6–10.5)
CHLORIDE SERPL-SCNC: 99 MMOL/L (ref 98–107)
CO2 SERPL-SCNC: 25.3 MMOL/L (ref 22–29)
CREAT SERPL-MCNC: 0.54 MG/DL (ref 0.76–1.27)
DEPRECATED RDW RBC AUTO: 39.2 FL (ref 37–54)
EGFRCR SERPLBLD CKD-EPI 2021: 127.6 ML/MIN/1.73
ERYTHROCYTE [DISTWIDTH] IN BLOOD BY AUTOMATED COUNT: 13.4 % (ref 12.3–15.4)
GLUCOSE BLDC GLUCOMTR-MCNC: 183 MG/DL (ref 70–130)
GLUCOSE BLDC GLUCOMTR-MCNC: 229 MG/DL (ref 70–130)
GLUCOSE BLDC GLUCOMTR-MCNC: 285 MG/DL (ref 70–130)
GLUCOSE BLDC GLUCOMTR-MCNC: 287 MG/DL (ref 70–130)
GLUCOSE SERPL-MCNC: 156 MG/DL (ref 65–99)
HCT VFR BLD AUTO: 30.3 % (ref 37.5–51)
HGB BLD-MCNC: 9.7 G/DL (ref 13–17.7)
MCH RBC QN AUTO: 25.7 PG (ref 26.6–33)
MCHC RBC AUTO-ENTMCNC: 32 G/DL (ref 31.5–35.7)
MCV RBC AUTO: 80.2 FL (ref 79–97)
PLATELET # BLD AUTO: 428 10*3/MM3 (ref 140–450)
PMV BLD AUTO: 8.4 FL (ref 6–12)
POTASSIUM SERPL-SCNC: 3.4 MMOL/L (ref 3.5–5.2)
RBC # BLD AUTO: 3.78 10*6/MM3 (ref 4.14–5.8)
SODIUM SERPL-SCNC: 135 MMOL/L (ref 136–145)
WBC NRBC COR # BLD AUTO: 10.28 10*3/MM3 (ref 3.4–10.8)

## 2024-08-10 PROCEDURE — 25010000002 HYDROMORPHONE PER 4 MG: Performed by: INTERNAL MEDICINE

## 2024-08-10 PROCEDURE — 25010000002 VANCOMYCIN HCL 1.25 G RECONSTITUTED SOLUTION 1 EACH VIAL: Performed by: INTERNAL MEDICINE

## 2024-08-10 PROCEDURE — 80048 BASIC METABOLIC PNL TOTAL CA: CPT | Performed by: INTERNAL MEDICINE

## 2024-08-10 PROCEDURE — 82948 REAGENT STRIP/BLOOD GLUCOSE: CPT

## 2024-08-10 PROCEDURE — 25810000003 SODIUM CHLORIDE 0.9 % SOLUTION 250 ML FLEX CONT: Performed by: INTERNAL MEDICINE

## 2024-08-10 PROCEDURE — 63710000001 INSULIN GLARGINE PER 5 UNITS: Performed by: INTERNAL MEDICINE

## 2024-08-10 PROCEDURE — 63710000001 INSULIN LISPRO (HUMAN) PER 5 UNITS: Performed by: INTERNAL MEDICINE

## 2024-08-10 PROCEDURE — 97162 PT EVAL MOD COMPLEX 30 MIN: CPT

## 2024-08-10 PROCEDURE — 85027 COMPLETE CBC AUTOMATED: CPT | Performed by: INTERNAL MEDICINE

## 2024-08-10 RX ORDER — POTASSIUM CHLORIDE 750 MG/1
40 TABLET, FILM COATED, EXTENDED RELEASE ORAL EVERY 4 HOURS
Status: COMPLETED | OUTPATIENT
Start: 2024-08-10 | End: 2024-08-10

## 2024-08-10 RX ADMIN — INSULIN LISPRO 2 UNITS: 100 INJECTION, SOLUTION INTRAVENOUS; SUBCUTANEOUS at 08:33

## 2024-08-10 RX ADMIN — INSULIN LISPRO 4 UNITS: 100 INJECTION, SOLUTION INTRAVENOUS; SUBCUTANEOUS at 12:14

## 2024-08-10 RX ADMIN — SERTRALINE 50 MG: 50 TABLET, FILM COATED ORAL at 08:32

## 2024-08-10 RX ADMIN — HYDROMORPHONE HYDROCHLORIDE 0.5 MG: 1 INJECTION, SOLUTION INTRAMUSCULAR; INTRAVENOUS; SUBCUTANEOUS at 01:43

## 2024-08-10 RX ADMIN — OXYCODONE HYDROCHLORIDE 5 MG: 5 TABLET ORAL at 08:32

## 2024-08-10 RX ADMIN — VANCOMYCIN HYDROCHLORIDE 1250 MG: 1.25 INJECTION, POWDER, LYOPHILIZED, FOR SOLUTION INTRAVENOUS at 15:00

## 2024-08-10 RX ADMIN — HYDROMORPHONE HYDROCHLORIDE 0.5 MG: 1 INJECTION, SOLUTION INTRAMUSCULAR; INTRAVENOUS; SUBCUTANEOUS at 20:16

## 2024-08-10 RX ADMIN — OXYCODONE HYDROCHLORIDE 5 MG: 5 TABLET ORAL at 21:28

## 2024-08-10 RX ADMIN — INSULIN LISPRO 3 UNITS: 100 INJECTION, SOLUTION INTRAVENOUS; SUBCUTANEOUS at 16:59

## 2024-08-10 RX ADMIN — INSULIN LISPRO 4 UNITS: 100 INJECTION, SOLUTION INTRAVENOUS; SUBCUTANEOUS at 21:18

## 2024-08-10 RX ADMIN — HYDROMORPHONE HYDROCHLORIDE 0.5 MG: 1 INJECTION, SOLUTION INTRAMUSCULAR; INTRAVENOUS; SUBCUTANEOUS at 09:42

## 2024-08-10 RX ADMIN — Medication 10 ML: at 08:33

## 2024-08-10 RX ADMIN — INSULIN GLARGINE 15 UNITS: 100 INJECTION, SOLUTION SUBCUTANEOUS at 21:18

## 2024-08-10 RX ADMIN — POTASSIUM CHLORIDE 40 MEQ: 750 TABLET, EXTENDED RELEASE ORAL at 09:42

## 2024-08-10 RX ADMIN — NICOTINE 1 PATCH: 21 PATCH, EXTENDED RELEASE TRANSDERMAL at 09:41

## 2024-08-10 RX ADMIN — VANCOMYCIN HYDROCHLORIDE 1250 MG: 1.25 INJECTION, POWDER, LYOPHILIZED, FOR SOLUTION INTRAVENOUS at 08:32

## 2024-08-10 RX ADMIN — POTASSIUM CHLORIDE 40 MEQ: 750 TABLET, EXTENDED RELEASE ORAL at 14:58

## 2024-08-10 RX ADMIN — HYDROMORPHONE HYDROCHLORIDE 0.5 MG: 1 INJECTION, SOLUTION INTRAMUSCULAR; INTRAVENOUS; SUBCUTANEOUS at 16:58

## 2024-08-10 RX ADMIN — VANCOMYCIN HYDROCHLORIDE 1250 MG: 1.25 INJECTION, POWDER, LYOPHILIZED, FOR SOLUTION INTRAVENOUS at 01:43

## 2024-08-10 RX ADMIN — OXYCODONE HYDROCHLORIDE 5 MG: 5 TABLET ORAL at 04:06

## 2024-08-10 RX ADMIN — OXYCODONE HYDROCHLORIDE 5 MG: 5 TABLET ORAL at 14:58

## 2024-08-10 RX ADMIN — HYDROMORPHONE HYDROCHLORIDE 0.5 MG: 1 INJECTION, SOLUTION INTRAMUSCULAR; INTRAVENOUS; SUBCUTANEOUS at 05:56

## 2024-08-10 RX ADMIN — HYDROMORPHONE HYDROCHLORIDE 0.5 MG: 1 INJECTION, SOLUTION INTRAMUSCULAR; INTRAVENOUS; SUBCUTANEOUS at 12:44

## 2024-08-10 RX ADMIN — Medication 10 ML: at 20:17

## 2024-08-10 NOTE — PLAN OF CARE
Goal Outcome Evaluation:              Outcome Evaluation: Pt is a 43 y.o. male admitted to [facility] with c/o soft tissue infection R foot on 8/5/2024. Pt underwent R foot debridement on 8/9 and Is currently NWB RLE. Per pt, he has been NWB RLE for 2.5 weeks and has been using bilateral axillary crutches to navigate home environemnt without issue. Therapist visualized patient perform bed mobility modIND, STS mod IND, and ambulate in room with B crutches while maintaining NWB restrictions. Pt reports understanding of restrictions and safe home environment and declines need to navigate stairs this date with PT as he states he has been doing at home for weeks. Anticipate pt will D/C to home with assist.      Anticipated Discharge Disposition (PT): home with assist

## 2024-08-10 NOTE — DISCHARGE PLACEMENT REQUEST
"Ekic, Ermin \"[place]\" (43 y.o. Male)       Date of Birth   1980    Social Security Number       Address   [address] [place], [place] [id number]    Home Phone       MRN   [id number]       Bahai   Unknown    Marital Status   Single                            Admission Date   8/5/24    Admission Type   Emergency    Admitting Provider   Andrea Pandya MD    Attending Provider   Andrea Pandya MD    Department, Room/Bed   [address], P394/1       Discharge Date       Discharge Disposition       Discharge Destination                                 Attending Provider: Andrea Pandya MD    Allergies: Pork-derived Products, Latex    Isolation: Contact   Infection: MRSA (08/06/24)   Code Status: CPR    Ht: 175 cm (68.9\")   Wt: 80 kg (176 lb 5.9 oz)    Admission Cmt: None   Principal Problem: Soft tissue infection [L08.9]                   Active Insurance as of 8/5/2024       Patient has no active insurance coverage on file for 8/5/2024.            Emergency Contacts        (Rel.) Home Phone Work Phone Mobile Phone    CLARY BERNARD (Spouse) -- -- [phone]                "

## 2024-08-10 NOTE — PLAN OF CARE
Goal Outcome Evaluation:      Patient alert/oriented x4. Dilaudid and Roxicodone given for pain PRN around the clock. IV Vancomycin. Potassium 3.4, oral replacement given.  Right foot dressing clean and intact, patient has elevated foot all shift. Ambulating with crutches. Blood sugars ACHS and insulin given per orders. Remains in contact precautions. This RN paged on-call CCP and placed consult order to try to get discharge planning started.

## 2024-08-10 NOTE — PROGRESS NOTES
Name: Shayy Ellison ADMIT: 2024   : 1980  PCP: Provider, No Known    MRN: [id number] LOS: 4 days   AGE/SEX: 43 y.o. male  ROOM: [facility]     Subjective     PICC line placed yesterday. No new complaints. Blood sugars are reasonable. Wants to go home.     Objective   Objective   Vital Signs  Temp:  [98.2 °F (36.8 °C)-99.1 °F (37.3 °C)] 98.4 °F (36.9 °C)  Heart Rate:  [] 94  Resp:  [18] 18  BP: (124-152)/(80-98) 137/80  SpO2:  [94 %-96 %] 95 %  on   ;   Device (Oxygen Therapy): room air  Body mass index is 26.12 kg/m².  Physical Exam  Constitutional:       General: He is not in acute distress.  HENT:      Head: Normocephalic and atraumatic.   Eyes:      Extraocular Movements: Extraocular movements intact.      Pupils: Pupils are equal, round, and reactive to light.   Cardiovascular:      Rate and Rhythm: Normal rate and regular rhythm.   Pulmonary:      Effort: Pulmonary effort is normal. No respiratory distress.   Abdominal:      General: There is no distension.      Palpations: Abdomen is soft.      Tenderness: There is no abdominal tenderness.   Musculoskeletal:         General: No swelling. Normal range of motion.      Comments: The right foot is bandaged   Skin:     General: Skin is warm and dry.   Neurological:      Mental Status: He is alert and oriented to person, place, and time.      Cranial Nerves: No cranial nerve deficit.     E      Results Review     I reviewed the patient's new clinical results.  Results from last 7 days   Lab Units 08/10/24  0651 24  0426 24  0501 24  1938   WBC 10*3/mm3 10.28 11.29* 17.27* 22.19*   HEMOGLOBIN g/dL 9.7* 10.4* 10.4* 11.1*   PLATELETS 10*3/mm3 428 383 383 440     Results from last 7 days   Lab Units 08/10/24  0651 24  0426 24  1656   SODIUM mmol/L 135* 134* 135* 134*   POTASSIUM mmol/L 3.4* 3.7 3.7 4.0   CHLORIDE mmol/L 99 106 104 101   CO2 mmol/L 25.3 20.2* 17.0* 16.1*   BUN mg/dL 5* 6 7 8   CREATININE mg/dL  "0.54* 0.62* 0.59* 0.72*   GLUCOSE mg/dL 156* 98 160* 169*   Estimated Creatinine Clearance: 201.6 mL/min (A) (by C-G formula based on SCr of 0.54 mg/dL (L)).  Results from last 7 days   Lab Units 08/08/24  0501 08/07/24 1938 08/05/24  1709   ALBUMIN g/dL 3.0* 3.4* 3.6   BILIRUBIN mg/dL 0.6 0.7 0.5   ALK PHOS U/L 79 92 86   AST (SGOT) U/L 6 11 6   ALT (SGPT) U/L 7 6 7     Results from last 7 days   Lab Units 08/10/24  0651 08/09/24  0426 08/08/24 2018 08/08/24  1656 08/08/24  1311 08/08/24  0835 08/08/24  0501 08/08/24  0051 08/07/24 1938 08/06/24  1037 08/05/24  1709   CALCIUM mg/dL 8.4* 8.7 8.5* 8.7 8.9 7.7* 9.0  9.0   < > 9.1  9.1   < > 9.2   ALBUMIN g/dL  --   --   --   --   --   --  3.0*  --  3.4*  --  3.6   MAGNESIUM mg/dL  --   --  1.8 1.7 1.7 1.6 1.7   < > 1.6  1.6  --   --    PHOSPHORUS mg/dL  --   --  2.2* 2.4* 2.7 2.9 3.4   < > 4.1  4.3  --   --     < > = values in this interval not displayed.     Results from last 7 days   Lab Units 08/07/24 1938 08/05/24  1709   LACTATE mmol/L 0.7 1.4   No results found for: \"COVID19\"  Hemoglobin A1C   Date/Time Value Ref Range Status   08/07/2024 1938 11.80 (H) 4.80 - 5.60 % Final     Glucose   Date/Time Value Ref Range Status   08/10/2024 1105 287 (H) 70 - 130 mg/dL Final   08/10/2024 0817 183 (H) 70 - 130 mg/dL Final   08/09/2024 2107 219 (H) 70 - 130 mg/dL Final   08/09/2024 1637 233 (H) 70 - 130 mg/dL Final   08/09/2024 1211 147 (H) 70 - 130 mg/dL Final   08/09/2024 1001 166 (H) 70 - 130 mg/dL Final   08/09/2024 0839 132 (H) 70 - 130 mg/dL Final     Results for orders placed or performed during the hospital encounter of 08/05/24   Blood Culture - Blood, Arm, Left    Specimen: Arm, Left; Blood   Result Value Ref Range    Blood Culture No growth at 4 days          XR Chest 1 View  Narrative: XR CHEST 1 VW-     HISTORY: Male who is 43 years-old, possible fluid overload     TECHNIQUE: Frontal view of the chest     COMPARISON: None available     FINDINGS: Heart, " mediastinum and pulmonary vasculature are unremarkable.  Small atelectasis or scarring at the left base. No focal pulmonary  consolidation, pleural effusion, or pneumothorax. No acute osseous  process.     Impression: Small likely scarring or atelectasis at the left base. No  focal pulmonary consolidation or edema. Follow-up as clinical  indications persist.     This report was finalized on 8/7/2024 7:38 PM by Dr. Donnell Alfredo M.D on Workstation: [id number]       Scheduled Medications  insulin glargine, 15 Units, Subcutaneous, Nightly  insulin lispro, 2-7 Units, Subcutaneous, 4x Daily AC & at Bedtime  nicotine, 1 patch, Transdermal, Q24H  potassium chloride ER, 40 mEq, Oral, Q4H  sertraline, 50 mg, Oral, Daily  sodium chloride, 10 mL, Intravenous, Q12H  sodium chloride, 10 mL, Intravenous, Q12H  vancomycin, 1,250 mg, Intravenous, Q8H    Infusions  lactated ringers, 9 mL/hr, Last Rate: 9 mL/hr (08/07/24 0725)  lactated ringers, 9 mL/hr, Last Rate: 9 mL/hr (08/09/24 0723)  Pharmacy to dose vancomycin,     Diet  Diet: Regular/House, Diabetic; Consistent Carbohydrate; Fluid Consistency: Thin (IDDSI 0)       Assessment/Plan     Active Hospital Problems    Diagnosis  POA    **Soft tissue infection [L08.9]  Yes    Sepsis [A41.9]  Unknown    Diabetic foot infection [E11.628, L08.9]  Yes    Cellulitis of right foot [L03.115]  Unknown    Abscess of right foot [L02.611]  Unknown      Resolved Hospital Problems   No resolved problems to display.       43 y.o. male admitted with Soft tissue infection.    Diabetic ketoacidosis  Resolved.    Type 1 diabetes  A1c is 12.5.  He will require insulin at discharge.  He also reports that he does not have a PCP. Discontinue glucommander and start glargine 15u qhs and monitor blood sugars.  I would like to start him off with basal insulin regimen as he starts off as he does not have a primary care provider to make adjustments.     Osteomyelitis  Right foot abscess  Wcx with MRSA. On  Vancomycin. Cefepime discontinued. .  Status post I&D along with biopsy of right fifth metatarsal.  Being treated for acute osteomyelitis with 4 weeks of vancomycin.  PICC line placed    Disposition is challenging.  He does not have a primary care provider.  Will ask CCP to assist with IV antibiotics and home health.        Andrea Pandya MD  [facility]  08/10/24  13:22 EDT

## 2024-08-10 NOTE — CASE MANAGEMENT/SOCIAL WORK
Continued Stay Note  [facility]     Patient Name: Shayy Ellison  MRN: [id number]  Today's Date: 8/10/2024    Admit Date: 8/5/2024    Plan: pending--referral to [facility] pending. Pt agreeable to come to ACU for PICC drsg changes and labs weekly   Discharge Plan       Row Name 08/10/24 1826       Plan    Plan pending--referral to [facility] pending. Pt agreeable to come to ACU for PICC drsg changes and labs weekly    Patient/Family in Agreement with Plan yes    Plan Comments VMM received from staff RN who stated pt needed help with DC plans and pt has a PICC line. Called in to pt's room. Introduced self/explained role of CCP at this time. Discussed DC plans for home with IV antibioticsuntil 9/3/24 as outlined by Dr. Saenz. Pt states he and his spouse can be taught how to do the infusion thru his PICC line. Asked if spouse/family/friends could drive him to the hospital weekly to have PICC dressing changed and labs drawn b/c HH was not an option at this time. Pt stated he would have someone that could drive him. Pt had no preference as to which IV infusion agency to use, so in basket referral in [facility] for [facility]. CCP to follow up with [facility] on Sunday to see if they will be able to accept him. Update to staff RN. CCP to follow...........JW                   Discharge Codes    No documentation.                 Expected Discharge Date and Time       Expected Discharge Date Expected Discharge Time    Aug 12, 2024               Yoselyn Ribeiro, RN

## 2024-08-10 NOTE — THERAPY DISCHARGE NOTE
Patient Name: Shayy Ellison  : 1980    MRN: [id number]                              Today's Date: 8/10/2024       Admit Date: 2024    Visit Dx:     ICD-10-CM ICD-9-CM   1. Cellulitis of right foot  L03.115 682.7   2. Uncontrolled type 2 diabetes mellitus with hyperglycemia  E11.65 250.02   3. Dermoid cyst of skin of back  D23.5 216.5   4. Abscess of right foot  L02.611 682.7   5. Diabetic foot infection  E11.628 250.80    L08.9 686.9     Patient Active Problem List   Diagnosis    Soft tissue infection    Sepsis    Cellulitis of right foot    Abscess of right foot    Diabetic foot infection     Past Medical History:   Diagnosis Date    Asthma     Diabetes      Past Surgical History:   Procedure Laterality Date    ANKLE SURGERY Left     BONE BIOPSY LEG Right 2024    Procedure: Right fifth metatarsal bone biopsy;  Surgeon: Alexander Freeman DPM;  Location: [facility];  Service: Podiatry;  Laterality: Right;    INCISION AND DRAINAGE LEG Right 2024    Procedure: Right foot incision and drainage;  Surgeon: Alexander Freeman DPM;  Location: [facility];  Service: Podiatry;  Laterality: Right;      General Information       Row Name 08/10/24 1440          Physical Therapy Time and Intention    Document Type evaluation  -RS     Mode of Treatment individual therapy;physical therapy  -RS       Row Name 08/10/24 1440          General Information    Patient Profile Reviewed yes  -RS     Prior Level of Function independent:  -RS     Existing Precautions/Restrictions weight bearing  -RS       Row Name 08/10/24 1440          Living Environment    People in Home child(benita), dependent;spouse  -RS       Row Name 08/10/24 1440          Home Main Entrance    Number of Stairs, Main Entrance three  -RS     Stair Railings, Main Entrance railings safe and in good condition  -RS       Row Name 08/10/24 1440          Stairs Within Home, Primary    Number of Stairs, Within Home, Primary none  -RS       Row Name  08/10/24 1440          Cognition    Orientation Status (Cognition) oriented x 4  -RS               User Key  (r) = Recorded By, (t) = Taken By, (c) = Cosigned By      Initials Name Provider Type    RS Ijeoma Bradley PT Physical Therapist                   Mobility       Row Name 08/10/24 1441          Bed Mobility    Bed Mobility bed mobility (all) activities  -RS     All Activities, [place] (Bed Mobility) modified independence  -RS       Row Name 08/10/24 1441          Sit-Stand Transfer    Sit-Stand [place] (Transfers) modified independence  -RS     Assistive Device (Sit-Stand Transfers) crutches, axillary  -RS       Row Name 08/10/24 1441          Gait/Stairs (Locomotion)    [place] Level (Gait) modified independence  -RS     Assistive Device (Gait) crutches, axillary  -RS     Distance in Feet (Gait) 20  -RS       Row Name 08/10/24 1441          Mobility    Extremity Weight-bearing Status right lower extremity  -RS     Right Lower Extremity (Weight-bearing Status) non weight-bearing (NWB)  -RS               User Key  (r) = Recorded By, (t) = Taken By, (c) = Cosigned By      Initials Name Provider Type    RS Ijeoma Bradley PT Physical Therapist                   Obj/Interventions       Row Name 08/10/24 1441          Range of Motion Comprehensive    Comment, General Range of Motion R ankle limited post operatively, LLE WNL  -RS       Row Name 08/10/24 1441          Strength Comprehensive (MMT)    Comment, General Manual Muscle Testing (MMT) Assessment LLE 5/5, R knee 5/5  -RS               User Key  (r) = Recorded By, (t) = Taken By, (c) = Cosigned By      Initials Name Provider Type    RS Ijeoma Bradley PT Physical Therapist                   Goals/Plan    No documentation.                  Clinical Impression       Row Name 08/10/24 1442          Pain    Pre/Posttreatment Pain Comment pain R foot when in dep position  -RS       Row Name 08/10/24 1442          Plan of Care Review     Outcome Evaluation Pt is a 43 y.o. male admitted to [facility] with c/o soft tissue infection R foot on 8/5/2024. Pt underwent R foot debridement on 8/9 and Is currently NWB RLE. Per pt, he has been NWB RLE for 2.5 weeks and has been using bilateral axillary crutches to navigate home environemnt without issue. Therapist visualized patient perform bed mobility modIND, STS mod IND, and ambulate in room with B crutches while maintaining NWB restrictions. Pt reports understanding of restrictions and safe home environment and declines need to navigate stairs this date with PT as he states he has been doing at home for weeks. Anticipate pt will D/C to home with assist.  -RS       Row Name 08/10/24 1442          Positioning and Restraints    Pre-Treatment Position in bed  -RS     Post Treatment Position bed  -RS     In Bed supine;call light within reach;encouraged to call for assist;exit alarm on  -RS               User Key  (r) = Recorded By, (t) = Taken By, (c) = Cosigned By      Initials Name Provider Type    RS Ijeoma Bradley, PT Physical Therapist                   Outcome Measures       Row Name 08/10/24 1442 08/10/24 0832       How much help from another person do you currently need...    Turning from your back to your side while in flat bed without using bedrails? 4  -RS 4  -SS    Moving from lying on back to sitting on the side of a flat bed without bedrails? 4  -RS 4  -SS    Moving to and from a bed to a chair (including a wheelchair)? 4  -RS 3  -SS    Standing up from a chair using your arms (e.g., wheelchair, bedside chair)? 4  -RS 3  -SS    Climbing 3-5 steps with a railing? 4  -RS 2  -SS    To walk in hospital room? 4  -RS 2  -SS    AM-PAC 6 Clicks Score (PT) 24  -RS 18  -SS    Highest Level of Mobility Goal 8 --> Walked 250 feet or more  -RS 6 --> Walk 10 steps or more  -SS      Row Name 08/10/24 1442          Functional Assessment    Outcome Measure Options AM-PAC 6 Clicks Basic Mobility (PT)  -RS                User Key  (r) = Recorded By, (t) = Taken By, (c) = Cosigned By      Initials Name Provider Type    RS Ijeoma Bradley, ESTRADA Physical Therapist    Shy Stevens RN Registered Nurse                  Physical Therapy Education       Title: PT OT SLP Therapies (Done)       Topic: Physical Therapy (Done)       Point: Mobility training (Done)       Learning Progress Summary             Patient Acceptance, E, VU by RS at 8/10/2024 1442                         Point: Home exercise program (Done)       Learning Progress Summary             Patient Acceptance, E, VU by RS at 8/10/2024 1442                         Point: Body mechanics (Done)       Learning Progress Summary             Patient Acceptance, E, VU by RS at 8/10/2024 1442                         Point: Precautions (Done)       Learning Progress Summary             Patient Acceptance, E, VU by RS at 8/10/2024 1442                                         User Key       Initials Effective Dates Name Provider Type Discipline     06/16/21 -  Ijeoma Bradley PT Physical Therapist PT                  PT Recommendation and Plan     Outcome Evaluation: Pt is a 43 y.o. male admitted to [facility] with c/o soft tissue infection R foot on 8/5/2024. Pt underwent R foot debridement on 8/9 and Is currently NWB RLE. Per pt, he has been NWB RLE for 2.5 weeks and has been using bilateral axillary crutches to navigate home environemnt without issue. Therapist visualized patient perform bed mobility modIND, STS mod IND, and ambulate in room with B crutches while maintaining NWB restrictions. Pt reports understanding of restrictions and safe home environment and declines need to navigate stairs this date with PT as he states he has been doing at home for weeks. Anticipate pt will D/C to home with assist.     Time Calculation:   PT Evaluation Complexity  History, PT Evaluation Complexity: 1-2 personal factors and/or comorbidities  Examination of Body Systems (PT Eval Complexity):  total of 3 or more elements  Clinical Presentation (PT Evaluation Complexity): evolving  Clinical Decision Making (PT Evaluation Complexity): moderate complexity  Overall Complexity (PT Evaluation Complexity): moderate complexity     PT Charges       Row Name 08/10/24 1443             Time Calculation    Start Time 1232  -RS      Stop Time 1242  -RS      Time Calculation (min) 10 min  -RS      PT Received On 08/10/24  -RS         Untimed Charges    PT Eval/Re-eval Minutes 8  -RS         Total Minutes    Untimed Charges Total Minutes 8  -RS       Total Minutes 8  -RS                User Key  (r) = Recorded By, (t) = Taken By, (c) = Cosigned By      Initials Name Provider Type    Ijeoma Crisostomo, PT Physical Therapist                      PT G-Codes  Outcome Measure Options: AM-PAC 6 Clicks Basic Mobility (PT)  AM-PAC 6 Clicks Score (PT): 24    PT Discharge Summary  Anticipated Discharge Disposition (PT): home with assist    Ijeoma Bradley PT  8/10/2024

## 2024-08-11 LAB
ANION GAP SERPL CALCULATED.3IONS-SCNC: 9 MMOL/L (ref 5–15)
BUN SERPL-MCNC: 8 MG/DL (ref 6–20)
BUN/CREAT SERPL: 16 (ref 7–25)
CALCIUM SPEC-SCNC: 8.8 MG/DL (ref 8.6–10.5)
CHLORIDE SERPL-SCNC: 95 MMOL/L (ref 98–107)
CO2 SERPL-SCNC: 30 MMOL/L (ref 22–29)
CREAT SERPL-MCNC: 0.5 MG/DL (ref 0.76–1.27)
EGFRCR SERPLBLD CKD-EPI 2021: 130.6 ML/MIN/1.73
GLUCOSE BLDC GLUCOMTR-MCNC: 199 MG/DL (ref 70–130)
GLUCOSE BLDC GLUCOMTR-MCNC: 241 MG/DL (ref 70–130)
GLUCOSE BLDC GLUCOMTR-MCNC: 252 MG/DL (ref 70–130)
GLUCOSE BLDC GLUCOMTR-MCNC: 255 MG/DL (ref 70–130)
GLUCOSE SERPL-MCNC: 207 MG/DL (ref 65–99)
POTASSIUM SERPL-SCNC: 3.9 MMOL/L (ref 3.5–5.2)
POTASSIUM SERPL-SCNC: 3.9 MMOL/L (ref 3.5–5.2)
SODIUM SERPL-SCNC: 134 MMOL/L (ref 136–145)
VANCOMYCIN TROUGH SERPL-MCNC: 13.8 MCG/ML (ref 5–20)

## 2024-08-11 PROCEDURE — 80202 ASSAY OF VANCOMYCIN: CPT | Performed by: INTERNAL MEDICINE

## 2024-08-11 PROCEDURE — 25010000002 VANCOMYCIN HCL 1.25 G RECONSTITUTED SOLUTION 1 EACH VIAL: Performed by: INTERNAL MEDICINE

## 2024-08-11 PROCEDURE — 63710000001 INSULIN GLARGINE PER 5 UNITS: Performed by: STUDENT IN AN ORGANIZED HEALTH CARE EDUCATION/TRAINING PROGRAM

## 2024-08-11 PROCEDURE — 80048 BASIC METABOLIC PNL TOTAL CA: CPT | Performed by: INTERNAL MEDICINE

## 2024-08-11 PROCEDURE — 25810000003 SODIUM CHLORIDE 0.9 % SOLUTION 250 ML FLEX CONT: Performed by: INTERNAL MEDICINE

## 2024-08-11 PROCEDURE — 63710000001 INSULIN LISPRO (HUMAN) PER 5 UNITS: Performed by: INTERNAL MEDICINE

## 2024-08-11 PROCEDURE — 82948 REAGENT STRIP/BLOOD GLUCOSE: CPT

## 2024-08-11 PROCEDURE — 25010000002 HYDROMORPHONE PER 4 MG: Performed by: INTERNAL MEDICINE

## 2024-08-11 PROCEDURE — 84132 ASSAY OF SERUM POTASSIUM: CPT | Performed by: STUDENT IN AN ORGANIZED HEALTH CARE EDUCATION/TRAINING PROGRAM

## 2024-08-11 RX ADMIN — INSULIN LISPRO 4 UNITS: 100 INJECTION, SOLUTION INTRAVENOUS; SUBCUTANEOUS at 17:20

## 2024-08-11 RX ADMIN — SERTRALINE 50 MG: 50 TABLET, FILM COATED ORAL at 08:20

## 2024-08-11 RX ADMIN — OXYCODONE HYDROCHLORIDE 5 MG: 5 TABLET ORAL at 06:41

## 2024-08-11 RX ADMIN — OXYCODONE HYDROCHLORIDE 5 MG: 5 TABLET ORAL at 21:22

## 2024-08-11 RX ADMIN — VANCOMYCIN HYDROCHLORIDE 1250 MG: 1.25 INJECTION, POWDER, LYOPHILIZED, FOR SOLUTION INTRAVENOUS at 08:19

## 2024-08-11 RX ADMIN — INSULIN LISPRO 3 UNITS: 100 INJECTION, SOLUTION INTRAVENOUS; SUBCUTANEOUS at 12:00

## 2024-08-11 RX ADMIN — INSULIN LISPRO 4 UNITS: 100 INJECTION, SOLUTION INTRAVENOUS; SUBCUTANEOUS at 21:18

## 2024-08-11 RX ADMIN — OXYCODONE HYDROCHLORIDE 5 MG: 5 TABLET ORAL at 02:31

## 2024-08-11 RX ADMIN — INSULIN LISPRO 2 UNITS: 100 INJECTION, SOLUTION INTRAVENOUS; SUBCUTANEOUS at 08:10

## 2024-08-11 RX ADMIN — HYDROMORPHONE HYDROCHLORIDE 0.5 MG: 1 INJECTION, SOLUTION INTRAMUSCULAR; INTRAVENOUS; SUBCUTANEOUS at 05:04

## 2024-08-11 RX ADMIN — HYDROMORPHONE HYDROCHLORIDE 0.5 MG: 1 INJECTION, SOLUTION INTRAMUSCULAR; INTRAVENOUS; SUBCUTANEOUS at 19:22

## 2024-08-11 RX ADMIN — VANCOMYCIN HYDROCHLORIDE 1250 MG: 1.25 INJECTION, POWDER, LYOPHILIZED, FOR SOLUTION INTRAVENOUS at 22:51

## 2024-08-11 RX ADMIN — HYDROMORPHONE HYDROCHLORIDE 0.5 MG: 1 INJECTION, SOLUTION INTRAMUSCULAR; INTRAVENOUS; SUBCUTANEOUS at 00:14

## 2024-08-11 RX ADMIN — Medication 10 ML: at 08:18

## 2024-08-11 RX ADMIN — HYDROMORPHONE HYDROCHLORIDE 0.5 MG: 1 INJECTION, SOLUTION INTRAMUSCULAR; INTRAVENOUS; SUBCUTANEOUS at 16:23

## 2024-08-11 RX ADMIN — HYDROMORPHONE HYDROCHLORIDE 0.5 MG: 1 INJECTION, SOLUTION INTRAMUSCULAR; INTRAVENOUS; SUBCUTANEOUS at 12:01

## 2024-08-11 RX ADMIN — INSULIN GLARGINE 20 UNITS: 100 INJECTION, SOLUTION SUBCUTANEOUS at 21:18

## 2024-08-11 RX ADMIN — HYDROMORPHONE HYDROCHLORIDE 0.5 MG: 1 INJECTION, SOLUTION INTRAMUSCULAR; INTRAVENOUS; SUBCUTANEOUS at 22:51

## 2024-08-11 RX ADMIN — Medication 10 ML: at 08:19

## 2024-08-11 RX ADMIN — HYDROMORPHONE HYDROCHLORIDE 0.5 MG: 1 INJECTION, SOLUTION INTRAMUSCULAR; INTRAVENOUS; SUBCUTANEOUS at 08:16

## 2024-08-11 RX ADMIN — VANCOMYCIN HYDROCHLORIDE 1250 MG: 1.25 INJECTION, POWDER, LYOPHILIZED, FOR SOLUTION INTRAVENOUS at 16:18

## 2024-08-11 RX ADMIN — VANCOMYCIN HYDROCHLORIDE 1250 MG: 1.25 INJECTION, POWDER, LYOPHILIZED, FOR SOLUTION INTRAVENOUS at 00:14

## 2024-08-11 NOTE — CASE MANAGEMENT/SOCIAL WORK
"Continued Stay Note  [facility]     Patient Name: Shayy Ellison  MRN: [id number]  Today's Date: 8/11/2024    Admit Date: 8/5/2024    Plan: Home with antibiotics   Discharge Plan       Row Name 08/11/24 1001       Plan    Plan Home with antibiotics    Plan Comments CCP contacted Karla with [facility] to determine cost to pt for IVAB. Cost will be $550/week for 4 weeks and [facility] is requesting 40% down. CCP approached pt and spouse, Criss, re the cost, as pt has no insurance. Spouse stated that there \"was no way\" they were going to pay for that. Spouse and pt stated that he should have medicaid. When CCP attempted to explain that pt was over income per [employer], spouse became agitated. Pt stated that he made between $800-900 a week sometimes. Explained that he was in fact over income for medicaid. Spouse and pt stated that the hospital would have to pay for his antibiotics. Informed pt and spouse that was not feasible. Spouse again became agitated with CCP. Informed pt and spouse that pt could go to ACU twice a day to obtain his IV infusion until 9-3-2024. Spouse and pt stated, \" That was not happening\". Pt and spouse stated that he was not going home until his antibiotics are paid for. Informed primary RN. Call placed to his attending MD.      Row Name 08/11/24 0853       Plan    Plan Comments Ambulatory referral placed in [facility] for [facility]. Contacted Dr. Saenz, who stated that he was out of the office next week and that [facility] would need to call his office for verbal orders for the treatment plan.                   Discharge Codes    No documentation.                 Expected Discharge Date and Time       Expected Discharge Date Expected Discharge Time    Aug 12, 2024               Kaylie Esparza, ED    "

## 2024-08-11 NOTE — PLAN OF CARE
Goal Outcome Evaluation:            Patient is alert and oriented x 4, on room air, c/o pain (see Mar).  Patient was out of bed once using his crutches.    TM

## 2024-08-11 NOTE — DISCHARGE INSTR - OTHER ORDERS
PLEASE CALL SCHEDULE ONE ON MONDAY AS SOON AS YOU CAN TO ARRANGE YOUR WEEKLY LAB DRAWS AND YOUR PICC LINE DRESSING CHANGES. THE PHONE NUMBER [phone].

## 2024-08-11 NOTE — PROGRESS NOTES
Name: Shayy Ellison ADMIT: 2024   : 1980  PCP: Provider, No Known    MRN: [id number] LOS: 5 days   AGE/SEX: 43 y.o. male  ROOM: [facility]     Subjective   No new complaints today.     Objective   Objective   Vital Signs  Temp:  [97.7 °F (36.5 °C)-98.6 °F (37 °C)] 98.6 °F (37 °C)  Heart Rate:  [85-98] 89  Resp:  [16-18] 18  BP: (128-138)/(71-88) 134/84  SpO2:  [95 %-97 %] 95 %  on   ;   Device (Oxygen Therapy): room air  Body mass index is 26.12 kg/m².  Physical Exam  Constitutional:       General: He is not in acute distress.  HENT:      Head: Normocephalic and atraumatic.   Eyes:      Extraocular Movements: Extraocular movements intact.      Pupils: Pupils are equal, round, and reactive to light.   Cardiovascular:      Rate and Rhythm: Normal rate and regular rhythm.   Pulmonary:      Effort: Pulmonary effort is normal. No respiratory distress.   Abdominal:      General: There is no distension.      Palpations: Abdomen is soft.      Tenderness: There is no abdominal tenderness.   Musculoskeletal:         General: No swelling. Normal range of motion.      Comments: The right foot is bandaged   Skin:     General: Skin is warm and dry.   Neurological:      Mental Status: He is alert and oriented to person, place, and time.      Cranial Nerves: No cranial nerve deficit.           Results Review     I reviewed the patient's new clinical results.  Results from last 7 days   Lab Units 08/10/24  0651 24  0426 24  0501 24  1938   WBC 10*3/mm3 10.28 11.29* 17.27* 22.19*   HEMOGLOBIN g/dL 9.7* 10.4* 10.4* 11.1*   PLATELETS 10*3/mm3 428 383 383 440     Results from last 7 days   Lab Units 24  0638 08/10/24  0651 246 24   SODIUM mmol/L 134* 135* 134* 135*   POTASSIUM mmol/L 3.9  3.9 3.4* 3.7 3.7   CHLORIDE mmol/L 95* 99 106 104   CO2 mmol/L 30.0* 25.3 20.2* 17.0*   BUN mg/dL 8 5* 6 7   CREATININE mg/dL 0.50* 0.54* 0.62* 0.59*   GLUCOSE mg/dL 207* 156* 98 160*  "  Estimated Creatinine Clearance: 217.8 mL/min (A) (by C-G formula based on SCr of 0.5 mg/dL (L)).  Results from last 7 days   Lab Units 08/08/24  0501 08/07/24 1938 08/05/24  1709   ALBUMIN g/dL 3.0* 3.4* 3.6   BILIRUBIN mg/dL 0.6 0.7 0.5   ALK PHOS U/L 79 92 86   AST (SGOT) U/L 6 11 6   ALT (SGPT) U/L 7 6 7     Results from last 7 days   Lab Units 08/11/24  0638 08/10/24  0651 08/09/24  0426 08/08/24  2018 08/08/24  1656 08/08/24  1311 08/08/24  0835 08/08/24  0501 08/08/24  0051 08/07/24 1938 08/06/24  1037 08/05/24  1709   CALCIUM mg/dL 8.8 8.4* 8.7 8.5* 8.7 8.9 7.7* 9.0  9.0   < > 9.1  9.1   < > 9.2   ALBUMIN g/dL  --   --   --   --   --   --   --  3.0*  --  3.4*  --  3.6   MAGNESIUM mg/dL  --   --   --  1.8 1.7 1.7 1.6 1.7   < > 1.6  1.6  --   --    PHOSPHORUS mg/dL  --   --   --  2.2* 2.4* 2.7 2.9 3.4   < > 4.1  4.3  --   --     < > = values in this interval not displayed.     Results from last 7 days   Lab Units 08/07/24 1938 08/05/24  1709   LACTATE mmol/L 0.7 1.4   No results found for: \"COVID19\"  Glucose   Date/Time Value Ref Range Status   08/11/2024 0733 199 (H) 70 - 130 mg/dL Final   08/10/2024 2100 285 (H) 70 - 130 mg/dL Final   08/10/2024 1650 229 (H) 70 - 130 mg/dL Final   08/10/2024 1105 287 (H) 70 - 130 mg/dL Final   08/10/2024 0817 183 (H) 70 - 130 mg/dL Final   08/09/2024 2107 219 (H) 70 - 130 mg/dL Final   08/09/2024 1637 233 (H) 70 - 130 mg/dL Final     Results for orders placed or performed during the hospital encounter of 08/05/24   Blood Culture - Blood, Arm, Left    Specimen: Arm, Left; Blood   Result Value Ref Range    Blood Culture No growth at 5 days          XR Chest 1 View  Narrative: XR CHEST 1 VW-     HISTORY: Male who is 43 years-old, possible fluid overload     TECHNIQUE: Frontal view of the chest     COMPARISON: None available     FINDINGS: Heart, mediastinum and pulmonary vasculature are unremarkable.  Small atelectasis or scarring at the left base. No focal " pulmonary  consolidation, pleural effusion, or pneumothorax. No acute osseous  process.     Impression: Small likely scarring or atelectasis at the left base. No  focal pulmonary consolidation or edema. Follow-up as clinical  indications persist.     This report was finalized on 8/7/2024 7:38 PM by Dr. Donnell Alfredo M.D on Workstation: [id number]       Scheduled Medications  insulin glargine, 15 Units, Subcutaneous, Nightly  insulin lispro, 2-7 Units, Subcutaneous, 4x Daily AC & at Bedtime  nicotine, 1 patch, Transdermal, Q24H  sertraline, 50 mg, Oral, Daily  sodium chloride, 10 mL, Intravenous, Q12H  sodium chloride, 10 mL, Intravenous, Q12H  vancomycin, 1,250 mg, Intravenous, Q8H    Infusions  lactated ringers, 9 mL/hr, Last Rate: 9 mL/hr (08/07/24 0725)  lactated ringers, 9 mL/hr, Last Rate: 9 mL/hr (08/09/24 0723)  Pharmacy to dose vancomycin,     Diet  Diet: Regular/House, Diabetic; Consistent Carbohydrate; Fluid Consistency: Thin (IDDSI 0)       Assessment/Plan     Active Hospital Problems    Diagnosis  POA    **Soft tissue infection [L08.9]  Yes    Sepsis [A41.9]  Unknown    Diabetic foot infection [E11.628, L08.9]  Yes    Cellulitis of right foot [L03.115]  Unknown    Abscess of right foot [L02.611]  Unknown      Resolved Hospital Problems   No resolved problems to display.       43 y.o. male admitted with Soft tissue infection.    Diabetic ketoacidosis  Resolved.    Type 1 diabetes  A1c is 12.5.  He will require insulin at discharge.  He also reports that he does not have a PCP. Discontinue glucommander and start glargine 15u qhs and monitor blood sugars. Increase lantus to 20u qhs    Osteomyelitis  Right foot abscess  Wcx with MRSA. On Vancomycin. Cefepime discontinued. .  Status post I&D along with biopsy of right fifth metatarsal.  Being treated for acute osteomyelitis with 4 weeks of vancomycin.  PICC line placed     Disposition is challenging.  He does not have a primary care provider.  Will ask CCP  to assist with IV antibiotics and home health. Apparently he will have to pay about $200/week for IV abx which might be an issue. He is not eligible fro medicaid.     Full code          Andrea Pandya MD  [facility]  08/11/24  11:33 EDT

## 2024-08-11 NOTE — PROGRESS NOTES
[facility] Clinical Pharmacy Services: Vancomycin Level Monitoring Note    Shayy Ellison is a 43 y.o. male who is on day 6/30 of pharmacy to dose vancomycin for Skin and Soft Tissue.    Estimated Creatinine Clearance: 201.6 mL/min (A) (by C-G formula based on SCr of 0.54 mg/dL (L)).    Current Vanc Dose:  1250 mg IV every  8  hours  Results from last 7 days   Lab Units 08/11/24  0638 08/09/24  1539 08/07/24  1345   VANCOMYCIN TR mcg/mL 13.80 8.90 5.60   Predicted AUC at current dose:464 mg/L.hr  Next Level Date and Time: Vanc Trough on 8/19 @ 0700    No changes at this time. Pharmacy is continuing to monitor and will adjust as needed.    Jean Pierre Parsons [facility]  Clinical Pharmacist

## 2024-08-11 NOTE — PLAN OF CARE
Problem: Glycemic Control Impaired (Sepsis/Septic Shock)  Goal: Blood Glucose Level Within Desired Range  Outcome: Ongoing, Progressing     Problem: Infection Progression (Sepsis/Septic Shock)  Goal: Absence of Infection Signs and Symptoms  Outcome: Ongoing, Progressing   Goal Outcome Evaluation:           Progress: no change  Outcome Evaluation: Pt appeared to sleep fair, alert and oriented x4, coop with care, pain managed with iv/po meds, tolerating iv antibiotics, voiding per urinal.                                I have intense pain to RLQ abdomen since 0400 and nausea; denies v/d

## 2024-08-11 NOTE — CASE MANAGEMENT/SOCIAL WORK
Continued Stay Note  [facility]     Patient Name: Shayy Ellison  MRN: [id number]  Today's Date: 8/11/2024    Admit Date: 8/5/2024    Plan: pending--referral to [facility] pending. Pt agreeable to come to ACU for PICC drsg changes and labs weekly   Discharge Plan       Row Name 08/11/24 0853       Plan    Plan Comments Ambulatory referral placed in [facility] for ACU. Contacted Dr. Saenz, who stated that he was out of the office next week and that ACu would need to call his office for verbal orders for the treatment plan.                   Discharge Codes    No documentation.                 Expected Discharge Date and Time       Expected Discharge Date Expected Discharge Time    Aug 12, 2024               Kaylie Esparza RN

## 2024-08-12 LAB
ANION GAP SERPL CALCULATED.3IONS-SCNC: 10.3 MMOL/L (ref 5–15)
BACTERIA SPEC ANAEROBE CULT: NORMAL
BUN SERPL-MCNC: 9 MG/DL (ref 6–20)
BUN/CREAT SERPL: 16.7 (ref 7–25)
CALCIUM SPEC-SCNC: 9.4 MG/DL (ref 8.6–10.5)
CHLORIDE SERPL-SCNC: 96 MMOL/L (ref 98–107)
CO2 SERPL-SCNC: 31.7 MMOL/L (ref 22–29)
CREAT SERPL-MCNC: 0.54 MG/DL (ref 0.76–1.27)
DEPRECATED RDW RBC AUTO: 40 FL (ref 37–54)
EGFRCR SERPLBLD CKD-EPI 2021: 127.6 ML/MIN/1.73
ERYTHROCYTE [DISTWIDTH] IN BLOOD BY AUTOMATED COUNT: 13.4 % (ref 12.3–15.4)
GLUCOSE BLDC GLUCOMTR-MCNC: 226 MG/DL (ref 70–130)
GLUCOSE BLDC GLUCOMTR-MCNC: 272 MG/DL (ref 70–130)
GLUCOSE BLDC GLUCOMTR-MCNC: 282 MG/DL (ref 70–130)
GLUCOSE BLDC GLUCOMTR-MCNC: 310 MG/DL (ref 70–130)
GLUCOSE SERPL-MCNC: 205 MG/DL (ref 65–99)
HCT VFR BLD AUTO: 33.4 % (ref 37.5–51)
HGB BLD-MCNC: 10.6 G/DL (ref 13–17.7)
MCH RBC QN AUTO: 25.8 PG (ref 26.6–33)
MCHC RBC AUTO-ENTMCNC: 31.7 G/DL (ref 31.5–35.7)
MCV RBC AUTO: 81.3 FL (ref 79–97)
PLATELET # BLD AUTO: 445 10*3/MM3 (ref 140–450)
PMV BLD AUTO: 8.1 FL (ref 6–12)
POTASSIUM SERPL-SCNC: 4 MMOL/L (ref 3.5–5.2)
RBC # BLD AUTO: 4.11 10*6/MM3 (ref 4.14–5.8)
SODIUM SERPL-SCNC: 138 MMOL/L (ref 136–145)
WBC NRBC COR # BLD AUTO: 9.49 10*3/MM3 (ref 3.4–10.8)

## 2024-08-12 PROCEDURE — 25010000002 VANCOMYCIN HCL 1.25 G RECONSTITUTED SOLUTION 1 EACH VIAL: Performed by: INTERNAL MEDICINE

## 2024-08-12 PROCEDURE — 82948 REAGENT STRIP/BLOOD GLUCOSE: CPT

## 2024-08-12 PROCEDURE — 25010000002 HYDROMORPHONE PER 4 MG: Performed by: INTERNAL MEDICINE

## 2024-08-12 PROCEDURE — 80048 BASIC METABOLIC PNL TOTAL CA: CPT | Performed by: INTERNAL MEDICINE

## 2024-08-12 PROCEDURE — 85027 COMPLETE CBC AUTOMATED: CPT | Performed by: STUDENT IN AN ORGANIZED HEALTH CARE EDUCATION/TRAINING PROGRAM

## 2024-08-12 PROCEDURE — 63710000001 INSULIN LISPRO (HUMAN) PER 5 UNITS: Performed by: INTERNAL MEDICINE

## 2024-08-12 PROCEDURE — 25810000003 SODIUM CHLORIDE 0.9 % SOLUTION 250 ML FLEX CONT: Performed by: INTERNAL MEDICINE

## 2024-08-12 PROCEDURE — 63710000001 INSULIN GLARGINE PER 5 UNITS: Performed by: STUDENT IN AN ORGANIZED HEALTH CARE EDUCATION/TRAINING PROGRAM

## 2024-08-12 PROCEDURE — 25010000002 HYDROMORPHONE PER 4 MG: Performed by: STUDENT IN AN ORGANIZED HEALTH CARE EDUCATION/TRAINING PROGRAM

## 2024-08-12 RX ORDER — OXYCODONE HYDROCHLORIDE 5 MG/1
5 TABLET ORAL EVERY 4 HOURS PRN
Status: DISCONTINUED | OUTPATIENT
Start: 2024-08-12 | End: 2024-08-13 | Stop reason: HOSPADM

## 2024-08-12 RX ORDER — HYDROMORPHONE HYDROCHLORIDE 1 MG/ML
0.5 INJECTION, SOLUTION INTRAMUSCULAR; INTRAVENOUS; SUBCUTANEOUS EVERY 8 HOURS PRN
Status: DISCONTINUED | OUTPATIENT
Start: 2024-08-12 | End: 2024-08-13 | Stop reason: HOSPADM

## 2024-08-12 RX ADMIN — HYDROMORPHONE HYDROCHLORIDE 0.5 MG: 1 INJECTION, SOLUTION INTRAMUSCULAR; INTRAVENOUS; SUBCUTANEOUS at 04:45

## 2024-08-12 RX ADMIN — HYDROMORPHONE HYDROCHLORIDE 0.5 MG: 1 INJECTION, SOLUTION INTRAMUSCULAR; INTRAVENOUS; SUBCUTANEOUS at 01:49

## 2024-08-12 RX ADMIN — Medication 10 ML: at 08:21

## 2024-08-12 RX ADMIN — OXYCODONE HYDROCHLORIDE 5 MG: 5 TABLET ORAL at 21:51

## 2024-08-12 RX ADMIN — HYDROMORPHONE HYDROCHLORIDE 0.5 MG: 1 INJECTION, SOLUTION INTRAMUSCULAR; INTRAVENOUS; SUBCUTANEOUS at 09:37

## 2024-08-12 RX ADMIN — INSULIN GLARGINE 20 UNITS: 100 INJECTION, SOLUTION SUBCUTANEOUS at 21:50

## 2024-08-12 RX ADMIN — INSULIN LISPRO 3 UNITS: 100 INJECTION, SOLUTION INTRAVENOUS; SUBCUTANEOUS at 08:21

## 2024-08-12 RX ADMIN — OXYCODONE HYDROCHLORIDE 5 MG: 5 TABLET ORAL at 12:25

## 2024-08-12 RX ADMIN — OXYCODONE HYDROCHLORIDE 5 MG: 5 TABLET ORAL at 08:22

## 2024-08-12 RX ADMIN — INSULIN LISPRO 4 UNITS: 100 INJECTION, SOLUTION INTRAVENOUS; SUBCUTANEOUS at 21:50

## 2024-08-12 RX ADMIN — HYDROMORPHONE HYDROCHLORIDE 0.5 MG: 1 INJECTION, SOLUTION INTRAMUSCULAR; INTRAVENOUS; SUBCUTANEOUS at 17:49

## 2024-08-12 RX ADMIN — Medication 10 ML: at 21:52

## 2024-08-12 RX ADMIN — SERTRALINE 50 MG: 50 TABLET, FILM COATED ORAL at 08:20

## 2024-08-12 RX ADMIN — INSULIN LISPRO 4 UNITS: 100 INJECTION, SOLUTION INTRAVENOUS; SUBCUTANEOUS at 12:25

## 2024-08-12 RX ADMIN — VANCOMYCIN HYDROCHLORIDE 1250 MG: 1.25 INJECTION, POWDER, LYOPHILIZED, FOR SOLUTION INTRAVENOUS at 08:21

## 2024-08-12 RX ADMIN — INSULIN LISPRO 5 UNITS: 100 INJECTION, SOLUTION INTRAVENOUS; SUBCUTANEOUS at 17:48

## 2024-08-12 RX ADMIN — VANCOMYCIN HYDROCHLORIDE 1250 MG: 1.25 INJECTION, POWDER, LYOPHILIZED, FOR SOLUTION INTRAVENOUS at 17:49

## 2024-08-12 NOTE — PROGRESS NOTES
Podiatry Progress Note      Patient: Shayy Ellison Admit Date: 08/05/2024    Age: 43 y.o.   PCP: Provider, No Known    MRN: [id number]  Room: [facility]        Subjective     Chief Complaint     Chief Complaint   Patient presents with    Foot Pain        HPI     Patient post op day 3 from right foot wound debridement and delayed primary wound closure.  Pain controlled, he has been NWB right foot and kept dressing intact.  Using crutches.  His pain is improving, WBC now normal.  He denies N/V/F/Ch.    Past Medical History     Past Medical History:   Diagnosis Date    Asthma     Diabetes         Past Surgical History:   Procedure Laterality Date    ANKLE SURGERY Left 2005    BONE BIOPSY LEG Right 8/7/2024    Procedure: Right fifth metatarsal bone biopsy;  Surgeon: Alexander Freeman DPM;  Location: [facility];  Service: Podiatry;  Laterality: Right;    INCISION AND DRAINAGE LEG Right 8/7/2024    Procedure: Right foot incision and drainage;  Surgeon: Alexander Freeman DPM;  Location: [facility] OR;  Service: Podiatry;  Laterality: Right;    WOUND CLOSURE Right 8/9/2024    Procedure: Right foot wound debridement and delayed primary wound closure;  Surgeon: Alexander Freeman DPM;  Location: [facility] OR;  Service: Podiatry;  Laterality: Right;        Allergies   Allergen Reactions    Pork-Derived Products Other (See Comments)     Dietary restriction     Latex Rash        Social History     Tobacco Use   Smoking Status Every Day    Current packs/day: 1.50    Average packs/day: 1.5 packs/day for 15.0 years (22.5 ttl pk-yrs)    Types: Cigarettes    Passive exposure: Never   Smokeless Tobacco Never        Objective   Physical Exam    Vitals:    08/12/24 0758   BP: 122/78   Pulse: 81   Resp: 16   Temp: 98.6 °F (37 °C)   SpO2: 96%        Vascular: DP and PT pulses palpable bilateral, capillary refill normal to all digits.  Dermatology: Skin texture and turgor normal.  Xerosis bilateral feet    Surgical site right plantar  foot incision with skin edges well approximated, sutures intact.  Much improved edema and erythema.  Small area of erythema and edema dorsal forefoot.  Musculoskeletal: MS and ROM within normal limits bilateral lower extremities.  Neurologic: Light touch sensation diminished bilaterally.\    Labs     Lab Results   Component Value Date    HGBA1C 11.80 (H) 08/07/2024    POCGLU 272 (H) 08/12/2024    SEDRATE 37 (H) 08/05/2024        CBC:      Lab 08/12/24  0444 08/10/24  0651 08/09/24  0426 08/08/24  0501 08/07/24  1938 08/07/24  0500 08/06/24  1037 08/05/24  1709   WBC 9.49 10.28 11.29* 17.27* 22.19*   < > 15.48* 13.56*   HEMOGLOBIN 10.6* 9.7* 10.4* 10.4* 11.1*   < > 11.4* 12.1*   HEMATOCRIT 33.4* 30.3* 31.7* 31.4* 33.2*   < > 35.4* 38.6   PLATELETS 445 428 383 383 440   < > 413 450   NEUTROS ABS  --   --   --   --  18.54*  --  12.12* 10.77*   IMMATURE GRANS (ABS)  --   --   --   --  0.25*  --  0.11* 0.08*   LYMPHS ABS  --   --   --   --  1.69  --  1.98 1.64   MONOS ABS  --   --   --   --  1.57*  --  0.99* 0.73   EOS ABS  --   --   --   --  0.06  --  0.23 0.29   MCV 81.3 80.2 79.6 80.7 79.4   < > 80.6 82.0    < > = values in this interval not displayed.          Results for orders placed or performed during the hospital encounter of 08/05/24   Blood Culture - Blood, Arm, Left    Specimen: Arm, Left; Blood   Result Value Ref Range    Blood Culture No growth at 5 days         XR Chest 1 View  Narrative: XR CHEST 1 VW-     HISTORY: Male who is 43 years-old, possible fluid overload     TECHNIQUE: Frontal view of the chest     COMPARISON: None available     FINDINGS: Heart, mediastinum and pulmonary vasculature are unremarkable.  Small atelectasis or scarring at the left base. No focal pulmonary  consolidation, pleural effusion, or pneumothorax. No acute osseous  process.     Impression: Small likely scarring or atelectasis at the left base. No  focal pulmonary consolidation or edema. Follow-up as clinical  indications  persist.     This report was finalized on 8/7/2024 7:38 PM by Dr. Donnell Alfredo M.D on Workstation: [id number]          Assessment/Plan     43 year old diabetic male POD3 right foot delayed primary wound closure:  - Dressing changed today, directed to keep clean, dry and intact.  - Continue NWB RLE in post op shoe with crutches  - Continue antibiotics per ID, appreciate management.  - No further intervention planned per podiatry at this time  - OK for discharge from my perspective.  - Thank you for consult.      Alexander Freeman DPM  Office: [phone]

## 2024-08-12 NOTE — PROGRESS NOTES
Name: Shayy Ellison ADMIT: 2024   : 1980  PCP: Provider, No Known    MRN: [id number] LOS: 6 days   AGE/SEX: 43 y.o. male  ROOM: [facility]     Subjective   Resting comfortably in bed.     Objective   Objective   Vital Signs  Temp:  [97.7 °F (36.5 °C)-98.6 °F (37 °C)] 98.6 °F (37 °C)  Heart Rate:  [81-93] 81  Resp:  [16] 16  BP: (122-134)/(78-88) 122/78  SpO2:  [94 %-96 %] 96 %  on   ;   Device (Oxygen Therapy): room air  Body mass index is 26.12 kg/m².  Physical Exam  Constitutional:       General: He is not in acute distress.  HENT:      Head: Normocephalic and atraumatic.   Eyes:      Extraocular Movements: Extraocular movements intact.      Pupils: Pupils are equal, round, and reactive to light.   Cardiovascular:      Rate and Rhythm: Normal rate and regular rhythm.   Pulmonary:      Effort: Pulmonary effort is normal. No respiratory distress.   Abdominal:      General: There is no distension.      Palpations: Abdomen is soft.      Tenderness: There is no abdominal tenderness.   Musculoskeletal:         General: No swelling. Normal range of motion.      Comments: The right foot is bandaged   Skin:     General: Skin is warm and dry.   Neurological:      Mental Status: He is alert and oriented to person, place, and time.      Cranial Nerves: No cranial nerve deficit.           Results Review     I reviewed the patient's new clinical results.  Results from last 7 days   Lab Units 08/12/24  0444 08/10/24  0651 24  0426 24  0501   WBC 10*3/mm3 9.49 10.28 11.29* 17.27*   HEMOGLOBIN g/dL 10.6* 9.7* 10.4* 10.4*   PLATELETS 10*3/mm3 445 428 383 383     Results from last 7 days   Lab Units 244 24  0638 08/10/24  0651 24  0426   SODIUM mmol/L 138 134* 135* 134*   POTASSIUM mmol/L 4.0 3.9  3.9 3.4* 3.7   CHLORIDE mmol/L 96* 95* 99 106   CO2 mmol/L 31.7* 30.0* 25.3 20.2*   BUN mg/dL 9 8 5* 6   CREATININE mg/dL 0.54* 0.50* 0.54* 0.62*   GLUCOSE mg/dL 205* 207* 156* 98   Estimated  "Creatinine Clearance: 201.6 mL/min (A) (by C-G formula based on SCr of 0.54 mg/dL (L)).  Results from last 7 days   Lab Units 08/08/24  0501 08/07/24 1938 08/05/24  1709   ALBUMIN g/dL 3.0* 3.4* 3.6   BILIRUBIN mg/dL 0.6 0.7 0.5   ALK PHOS U/L 79 92 86   AST (SGOT) U/L 6 11 6   ALT (SGPT) U/L 7 6 7     Results from last 7 days   Lab Units 08/12/24  0444 08/11/24  0638 08/10/24  0651 08/09/24  0426 08/08/24  2018 08/08/24  1656 08/08/24  1311 08/08/24  0835 08/08/24  0501 08/08/24  0051 08/07/24 1938 08/06/24  1037 08/05/24  1709   CALCIUM mg/dL 9.4 8.8 8.4* 8.7 8.5* 8.7 8.9 7.7* 9.0  9.0   < > 9.1  9.1   < > 9.2   ALBUMIN g/dL  --   --   --   --   --   --   --   --  3.0*  --  3.4*  --  3.6   MAGNESIUM mg/dL  --   --   --   --  1.8 1.7 1.7 1.6 1.7   < > 1.6  1.6  --   --    PHOSPHORUS mg/dL  --   --   --   --  2.2* 2.4* 2.7 2.9 3.4   < > 4.1  4.3  --   --     < > = values in this interval not displayed.     Results from last 7 days   Lab Units 08/07/24 1938 08/05/24  1709   LACTATE mmol/L 0.7 1.4   No results found for: \"COVID19\"  Glucose   Date/Time Value Ref Range Status   08/12/2024 1108 272 (H) 70 - 130 mg/dL Final   08/12/2024 0801 226 (H) 70 - 130 mg/dL Final   08/11/2024 2109 255 (H) 70 - 130 mg/dL Final   08/11/2024 1629 252 (H) 70 - 130 mg/dL Final   08/11/2024 1143 241 (H) 70 - 130 mg/dL Final   08/11/2024 0733 199 (H) 70 - 130 mg/dL Final   08/10/2024 2100 285 (H) 70 - 130 mg/dL Final     Results for orders placed or performed during the hospital encounter of 08/05/24   Blood Culture - Blood, Arm, Left    Specimen: Arm, Left; Blood   Result Value Ref Range    Blood Culture No growth at 5 days          XR Chest 1 View  Narrative: XR CHEST 1 VW-     HISTORY: Male who is 43 years-old, possible fluid overload     TECHNIQUE: Frontal view of the chest     COMPARISON: None available     FINDINGS: Heart, mediastinum and pulmonary vasculature are unremarkable.  Small atelectasis or scarring at the left base. " No focal pulmonary  consolidation, pleural effusion, or pneumothorax. No acute osseous  process.     Impression: Small likely scarring or atelectasis at the left base. No  focal pulmonary consolidation or edema. Follow-up as clinical  indications persist.     This report was finalized on 8/7/2024 7:38 PM by Dr. Donnell Alfredo M.D on Workstation: [id number]       Scheduled Medications  insulin glargine, 20 Units, Subcutaneous, Nightly  insulin lispro, 2-7 Units, Subcutaneous, 4x Daily AC & at Bedtime  nicotine, 1 patch, Transdermal, Q24H  sertraline, 50 mg, Oral, Daily  sodium chloride, 10 mL, Intravenous, Q12H  sodium chloride, 10 mL, Intravenous, Q12H  vancomycin, 1,250 mg, Intravenous, Q8H    Infusions  lactated ringers, 9 mL/hr, Last Rate: 9 mL/hr (08/07/24 0725)  lactated ringers, 9 mL/hr, Last Rate: 9 mL/hr (08/09/24 0723)  Pharmacy to dose vancomycin,     Diet  Diet: Regular/House, Diabetic; Consistent Carbohydrate; Fluid Consistency: Thin (IDDSI 0)       Assessment/Plan     Active Hospital Problems    Diagnosis  POA    **Soft tissue infection [L08.9]  Yes    Sepsis [A41.9]  Unknown    Diabetic foot infection [E11.628, L08.9]  Yes    Cellulitis of right foot [L03.115]  Unknown    Abscess of right foot [L02.611]  Unknown      Resolved Hospital Problems   No resolved problems to display.       43 y.o. male admitted with Soft tissue infection.    Diabetic ketoacidosis  Resolved.    Type 1 diabetes  A1c is 12.5.  He will require insulin at discharge.  He also reports that he does not have a PCP. Discontinue glucommander and start glargine 15u qhs and monitor blood sugars. Increase lantus to 20u qhs    Osteomyelitis  Right foot abscess  Wcx with MRSA. On Vancomycin. Cefepime discontinued. .  Status post I&D along with biopsy of right fifth metatarsal.  Being treated for acute osteomyelitis with 4 weeks of vancomycin.  PICC line placed     Disposition is challenging.  He does not have a primary care provider.   Will ask CCP to assist with IV antibiotics and home health. Apparently he will have to pay about $200/week for IV abx which might be an issue. He is not eligible fro medicaid.     Full code          Andrea Pandya MD  [facility]  08/12/24  11:23 EDT

## 2024-08-12 NOTE — PLAN OF CARE
Goal Outcome Evaluation:  Plan of Care Reviewed With: patient        Progress: no change  Outcome Evaluation: Requesting IV dilaudid q3 hours. Encourgaed to take the PO meds and only took x 1. Voiding per urinal. IV abx given per PICC. Concerned about not being able to pay for abx.

## 2024-08-12 NOTE — CASE MANAGEMENT/SOCIAL WORK
Continued Stay Note  [facility]     Patient Name: Shayy Ellison  MRN: [id number]  Today's Date: 8/12/2024    Admit Date: 8/5/2024    Plan: Home. Trying to figure out how patient is going to get IV abx   Discharge Plan       Row Name 08/12/24 1611       Plan    Plan Home. Trying to figure out how patient is going to get IV abx    Plan Comments CCP called and spoke to the patient he requested CCP call and speak to the spouse. CCP called the spouse to discuss IV abx. [facility] discussed all the options with the spouse. [facility] for $550/week for fur weeks. She asked if Good [facility] would help which they will not. She stated that patient can not go to the [facility] twice daily. JOSE reached out to Letty, and he stated that he can put the patient on once-a-day Daptomycin at the ACU. JOSE discussed this with the spouse she stated that on Wednesday-Sunday she would not be able to take the patient due to the kids having to go to school and she must go to work at 10am. Patients spouse would not make a decision about the IV abx. [facility] contacted Lisa PAREKH case management manager and she reached out to the patient spouse to discuss more with her. JOSE is following.                   Discharge Codes    No documentation.                 Expected Discharge Date and Time       Expected Discharge Date Expected Discharge Time    Aug 13, 2024

## 2024-08-12 NOTE — PLAN OF CARE
Goal Outcome Evaluation:  Plan of Care Reviewed With: patient, spouse           Outcome Evaluation: alert & oriented x4, vitals stable on room air, prn pain medications given-- see MAR, iv abx given, PICC C/D/I with good blood return, pt requesting dilaudid to help him sleep, RN educated patient regarding PRN pain medications and the proper use of them, and encouraged PO pain medication, blood sugars treated, discharge planning

## 2024-08-13 ENCOUNTER — READMISSION MANAGEMENT (OUTPATIENT)
Dept: CALL CENTER | Facility: HOSPITAL | Age: 44
End: 2024-08-13

## 2024-08-13 ENCOUNTER — NURSE TRIAGE (OUTPATIENT)
Dept: CALL CENTER | Facility: HOSPITAL | Age: 44
End: 2024-08-13

## 2024-08-13 VITALS
TEMPERATURE: 98.4 F | HEART RATE: 76 BPM | WEIGHT: 176.37 LBS | BODY MASS INDEX: 26.12 KG/M2 | DIASTOLIC BLOOD PRESSURE: 76 MMHG | RESPIRATION RATE: 16 BRPM | OXYGEN SATURATION: 95 % | SYSTOLIC BLOOD PRESSURE: 121 MMHG | HEIGHT: 69 IN

## 2024-08-13 LAB
ANION GAP SERPL CALCULATED.3IONS-SCNC: 10.5 MMOL/L (ref 5–15)
BUN SERPL-MCNC: 10 MG/DL (ref 6–20)
BUN/CREAT SERPL: 15.2 (ref 7–25)
CALCIUM SPEC-SCNC: 9.1 MG/DL (ref 8.6–10.5)
CHLORIDE SERPL-SCNC: 97 MMOL/L (ref 98–107)
CO2 SERPL-SCNC: 28.5 MMOL/L (ref 22–29)
CREAT SERPL-MCNC: 0.66 MG/DL (ref 0.76–1.27)
EGFRCR SERPLBLD CKD-EPI 2021: 120.1 ML/MIN/1.73
GLUCOSE BLDC GLUCOMTR-MCNC: 237 MG/DL (ref 70–130)
GLUCOSE BLDC GLUCOMTR-MCNC: 279 MG/DL (ref 70–130)
GLUCOSE SERPL-MCNC: 237 MG/DL (ref 65–99)
POTASSIUM SERPL-SCNC: 4.1 MMOL/L (ref 3.5–5.2)
SODIUM SERPL-SCNC: 136 MMOL/L (ref 136–145)

## 2024-08-13 PROCEDURE — 25010000002 VANCOMYCIN HCL 1.25 G RECONSTITUTED SOLUTION 1 EACH VIAL: Performed by: INTERNAL MEDICINE

## 2024-08-13 PROCEDURE — 63710000001 INSULIN LISPRO (HUMAN) PER 5 UNITS: Performed by: INTERNAL MEDICINE

## 2024-08-13 PROCEDURE — 25010000002 HYDROMORPHONE PER 4 MG: Performed by: STUDENT IN AN ORGANIZED HEALTH CARE EDUCATION/TRAINING PROGRAM

## 2024-08-13 PROCEDURE — 82948 REAGENT STRIP/BLOOD GLUCOSE: CPT

## 2024-08-13 PROCEDURE — 80048 BASIC METABOLIC PNL TOTAL CA: CPT | Performed by: INTERNAL MEDICINE

## 2024-08-13 PROCEDURE — 25810000003 SODIUM CHLORIDE 0.9 % SOLUTION 250 ML FLEX CONT: Performed by: INTERNAL MEDICINE

## 2024-08-13 RX ORDER — INSULIN DEGLUDEC 100 U/ML
15 INJECTION, SOLUTION SUBCUTANEOUS DAILY
Qty: 15 ML | Refills: 1 | Status: SHIPPED | OUTPATIENT
Start: 2024-08-13 | End: 2024-08-13

## 2024-08-13 RX ORDER — NICOTINE 21 MG/24HR
1 PATCH, TRANSDERMAL 24 HOURS TRANSDERMAL
Qty: 28 EACH | Refills: 0 | Status: SHIPPED | OUTPATIENT
Start: 2024-08-14

## 2024-08-13 RX ORDER — INSULIN DEGLUDEC 100 U/ML
15 INJECTION, SOLUTION SUBCUTANEOUS DAILY
Qty: 15 ML | Refills: 1 | Status: SHIPPED | OUTPATIENT
Start: 2024-08-13

## 2024-08-13 RX ORDER — OXYCODONE HYDROCHLORIDE 5 MG/1
5 TABLET ORAL EVERY 4 HOURS PRN
Qty: 15 TABLET | Refills: 0 | Status: SHIPPED | OUTPATIENT
Start: 2024-08-13 | End: 2024-08-17

## 2024-08-13 RX ADMIN — NICOTINE 1 PATCH: 21 PATCH, EXTENDED RELEASE TRANSDERMAL at 08:23

## 2024-08-13 RX ADMIN — OXYCODONE HYDROCHLORIDE 5 MG: 5 TABLET ORAL at 15:51

## 2024-08-13 RX ADMIN — INSULIN LISPRO 2 UNITS: 100 INJECTION, SOLUTION INTRAVENOUS; SUBCUTANEOUS at 08:23

## 2024-08-13 RX ADMIN — ACETAMINOPHEN 1000 MG: 500 TABLET ORAL at 15:08

## 2024-08-13 RX ADMIN — OXYCODONE HYDROCHLORIDE 5 MG: 5 TABLET ORAL at 06:07

## 2024-08-13 RX ADMIN — OXYCODONE HYDROCHLORIDE 5 MG: 5 TABLET ORAL at 12:00

## 2024-08-13 RX ADMIN — INSULIN LISPRO 4 UNITS: 100 INJECTION, SOLUTION INTRAVENOUS; SUBCUTANEOUS at 12:00

## 2024-08-13 RX ADMIN — Medication 10 ML: at 08:25

## 2024-08-13 RX ADMIN — HYDROMORPHONE HYDROCHLORIDE 0.5 MG: 1 INJECTION, SOLUTION INTRAMUSCULAR; INTRAVENOUS; SUBCUTANEOUS at 10:15

## 2024-08-13 RX ADMIN — VANCOMYCIN HYDROCHLORIDE 1250 MG: 1.25 INJECTION, POWDER, LYOPHILIZED, FOR SOLUTION INTRAVENOUS at 02:11

## 2024-08-13 RX ADMIN — VANCOMYCIN HYDROCHLORIDE 1250 MG: 1.25 INJECTION, POWDER, LYOPHILIZED, FOR SOLUTION INTRAVENOUS at 10:19

## 2024-08-13 RX ADMIN — HYDROMORPHONE HYDROCHLORIDE 0.5 MG: 1 INJECTION, SOLUTION INTRAMUSCULAR; INTRAVENOUS; SUBCUTANEOUS at 02:11

## 2024-08-13 NOTE — TELEPHONE ENCOUNTER
Reason for Disposition  • [1] Prescription not at pharmacy AND [2] was prescribed by PCP recently (Exception: Triager has access to EMR and prescription is recorded there. Go to Home Care and confirm for pharmacy.)    Additional Information  • Negative: [1] Intentional drug overdose AND [2] suicidal thoughts or ideas  • Negative: Drug overdose and triager unable to answer question  • Negative: Caller requesting a renewal or refill of a medicine patient is currently taking  • Negative: Caller requesting information unrelated to medicine  • Negative: Caller requesting information about COVID-19 Vaccine  • Negative: Caller requesting information about Emergency Contraception  • Negative: Caller requesting information about Combined Birth Control Pills  • Negative: Caller requesting information about Progestin Birth Control Pills  • Negative: Caller requesting information about Post-Op pain or medicines  • Negative: Caller requesting a prescription antibiotic (such as Penicillin) for Strep throat and has a positive culture result  • Negative: Caller requesting a prescription anti-viral med (such as Tamiflu) and has influenza (flu) symptoms  • Negative: Immunization reaction suspected  • Negative: Rash while taking a medicine or within 3 days of stopping it  • Negative: [1] Asthma and [2] having symptoms of asthma (cough, wheezing, etc.)  • Negative: [1] Symptom of illness (e.g., headache, abdominal pain, earache, vomiting) AND [2] more than mild  • Negative: Breastfeeding questions about mother's medicines and diet  • Negative: MORE THAN A DOUBLE DOSE of a prescription or over-the-counter (OTC) drug  • Negative: [1] DOUBLE DOSE (an extra dose or lesser amount) of prescription drug AND [2] any symptoms (e.g., dizziness, nausea, pain, sleepiness)  • Negative: [1] DOUBLE DOSE (an extra dose or lesser amount) of over-the-counter (OTC) drug AND [2] any symptoms (e.g., dizziness, nausea, pain, sleepiness)  • Negative: Took  "another person's prescription drug  • Negative: [1] DOUBLE DOSE (an extra dose or lesser amount) of prescription drug AND [2] NO symptoms  (Exception: A double dose of antibiotics.)  • Negative: Diabetes drug error or overdose (e.g., took wrong type of insulin or took extra dose)  • Negative: [1] Pharmacy calling with prescription question AND [2] triager unable to answer question    Answer Assessment - Initial Assessment Questions  1. NAME of MEDICINE: \"What medicine(s) are you calling about?\"      Tresiba insulin pen   2. QUESTION: \"What is your question?\" (e.g., double dose of medicine, side effect)      Med not at pharmacy per patient when he got all of his other meds from [facility]   3. PRESCRIBER: \"Who prescribed the medicine?\" Reason: if prescribed by specialist, call should be referred to that group.      Hospitalist Dr SURAJ Pandya  4. SYMPTOMS: \"Do you have any symptoms?\" If Yes, ask: \"What symptoms are you having?\"  \"How bad are the symptoms (e.g., mild, moderate, severe)      dm  5. PREGNANCY:  \"Is there any chance that you are pregnant?\" \"When was your last menstrual period?\"      N/a    Protocols used: Medication Question Call-ADULT-    "

## 2024-08-13 NOTE — CASE MANAGEMENT/SOCIAL WORK
Continued Stay Note  [facility]     Patient Name: Shayy Ellison  MRN: [id number]  Today's Date: 8/13/2024    Admit Date: 8/5/2024    Plan: Home with [facility]   Discharge Plan       Row Name 08/13/24 1328       Plan    Plan Home with [facility]    Patient/Family in Agreement with Plan yes    Plan Comments CCP spoke to Tamiko with [facility] she stated that the patients have a down payment of $500 and then they will send a bill to the patient once he is done with his treatment. CCP talked to the patient’s spouse, and they are agreeable. [facility] is going to come teach the patient and spouse and provide the abx. Spouse can transport home.                   Discharge Codes    No documentation.                 Expected Discharge Date and Time       Expected Discharge Date Expected Discharge Time    Aug 13, 2024

## 2024-08-13 NOTE — DISCHARGE SUMMARY
Patient Name: Shayy Ellison  : 1980  MRN: [id number]    Date of Admission: 2024  Date of Discharge:  2024  Primary Care Physician: Provider, No Known      Chief Complaint:   Foot Pain      Discharge Diagnoses     Active Hospital Problems    Diagnosis  POA    **Soft tissue infection [L08.9]  Yes    Sepsis [A41.9]  Unknown    Diabetic foot infection [E11.628, L08.9]  Yes    Cellulitis of right foot [L03.115]  Unknown    Abscess of right foot [L02.611]  Unknown      Resolved Hospital Problems   No resolved problems to display.        Hospital Course       This is a 43-year-old man with no significant past medical history who presented to hospital with right foot pain.  He went initially to an urgent care center and was prescribed Bactrim.  Unfortunately his symptoms did not improve and his erythema and swelling progressed.  Upon presentation to the emergency department he underwent an MRI which was concerning for bone marrow edema as well as infected collection/abscess.  Podiatry and infectious disease were consulted.  He underwent incision and drainage by podiatry.  Tissue biopsy of the bone of the right foot was also pain.  He will be treated with 4 weeks of IV antibiotics vancomycin  He was also noted to have severe diabetes.  His A1c was noted to be 0.5.  He had been noncompliant with his diabetes management up until this point.  He will be discharged on 15 units of Tresiba.  His hospital course was complicated by DKA.  He spent a few days in the ICU.      Physical Exam:  Temp:  [98.2 °F (36.8 °C)-98.6 °F (37 °C)] 98.4 °F (36.9 °C)  Heart Rate:  [76-90] 76  Resp:  [16] 16  BP: (121-133)/(76-85) 121/76  Body mass index is 26.12 kg/m².  Physical Exam  Constitutional:       General: He is not in acute distress.  HENT:      Head: Normocephalic and atraumatic.   Cardiovascular:      Rate and Rhythm: Normal rate and regular rhythm.   Pulmonary:      Effort: Pulmonary effort is normal. No respiratory  distress.   Abdominal:      General: There is no distension.      Palpations: Abdomen is soft.      Tenderness: There is no abdominal tenderness.   Skin:     Comments: Right foot is bandaged    Neurological:      Mental Status: He is alert and oriented to person, place, and time.         Consultants     Consult Orders (all) (From admission, onward)       Start     Ordered    08/13/24 1153  Inpatient Diabetes Educator Consult  Once        Provider:  (Not yet assigned)    08/13/24 1152    08/10/24 1454  Inpatient Case Management  Consult  Once        Provider:  (Not yet assigned)    08/10/24 1453    08/09/24 0945  Inpatient IV Team Consult PICC 1 Lumen  Once        Provider:  (Not yet assigned)    08/09/24 0945    08/07/24 1749  Inpatient Diabetes Educator Consult  Once,   Status:  Canceled        Provider:  (Not yet assigned)    08/07/24 1749    08/07/24 1703  Inpatient Pulmonology Consult  Once        Specialty:  Pulmonary Disease  Provider:  Jocelyn Lantigua MD    08/07/24 1708    08/06/24 1223  Inpatient Infectious Diseases Consult  Once        Specialty:  Infectious Diseases  Provider:  Colin Rodriguez MD    08/06/24 1222    08/06/24 1219  Inpatient Hospitalist Consult  Once        Specialty:  Hospitalist  Provider:  Andrea Pandya MD    08/06/24 1219    08/06/24 1218  Inpatient Hospitalist Consult  Once,   Status:  Canceled        Specialty:  Hospitalist  Provider:  (Not yet assigned)    08/06/24 1217    08/05/24 2344  Inpatient Case Management  Consult  Once        Provider:  (Not yet assigned)    08/05/24 2344    08/05/24 2344  Inpatient Diabetes Educator Consult  Once        Provider:  (Not yet assigned)    08/05/24 2344    08/05/24 1940  Inpatient Podiatry Consult  Once        Specialty:  Podiatry  Provider:  Shashi Davidson DPM    08/05/24 1940                  Procedures     Imaging Results (All)       Procedure Component Value Units Date/Time    XR Chest 1 View  [[id number]] Collected: 08/07/24 1938     Updated: 08/07/24 1941    Narrative:      XR CHEST 1 VW-     HISTORY: Male who is 43 years-old, possible fluid overload     TECHNIQUE: Frontal view of the chest     COMPARISON: None available     FINDINGS: Heart, mediastinum and pulmonary vasculature are unremarkable.  Small atelectasis or scarring at the left base. No focal pulmonary  consolidation, pleural effusion, or pneumothorax. No acute osseous  process.       Impression:      Small likely scarring or atelectasis at the left base. No  focal pulmonary consolidation or edema. Follow-up as clinical  indications persist.     This report was finalized on 8/7/2024 7:38 PM by Dr. Donnell Alfredo M.D on Workstation: [id number]       MRI Foot Right With & Without Contrast [[id number]] Collected: 08/06/24 0954     Updated: 08/06/24 1237    Narrative:      MRI RIGHT MIDFOOT AND PROXIMAL FOREFOOT WITH AND WITHOUT CONTRAST     HISTORY: Twisted right foot and stepped on plastic causing superficial  abrasion. Swelling and redness in the right foot.     TECHNIQUE:  MRI includes coronal, short axis T1, T1 fat-sat, T2 fat-sat,  axial T1, STIR, sagittal PD fat-saturated sequences.  Contrast was  administered IV followed by axial, coronal, sagittal T1 fat-saturated  sequences.     COMPARISON: Right foot x-rays 08/05/2024.     FINDINGS: A gel capsule was placed at the site of clinical interest and  this gelcap was dorsal to the distal 4th metatarsal. A 2nd gel capsule  was placed lateral to the central to anterior calcaneus. There is  generalized edema and swelling in subcutaneous fat as well as  enhancement that is nonspecific though is consistent with cellulitis.  There is also generalized muscular edema consistent with myositis.     Within the distal aspect of the lateral cuneiform there is a focal area  of T1 hypointense and STIR hyperintense signal that is attributed to  arthritic changes at the 3rd TMT joint. There is also bone  marrow edema  and enhancement within the base of the 5th metatarsal. There is a  nonenhancing fluid collection at the lateral anterior hindfoot, midfoot,  and proximal forefoot. The proximal extent of the visualized collection  is within the abductor digiting minimi muscle and flexor digiti minimi  muscles and the collection extends plantar to the peroneus longus tendon  adjacent to the plantar cuboid. Collection extends distally within the  abductor digiting minimi muscle to the coronal level of the joints in  the mid-third and distal-third of the 5th metatarsal. The AP extension  of this nonenhancing collection and presumed infected collection/abscess  is approximately 9.2 cm. Collection measures up to 3 cm transverse  dimension by 2.5 cm in height. The distal 1st through 4th proximal  phalanges and the tip of the 5th distal phalanx are excluded from the  field-of-view.     There is suspected peroneus brevis split tear at the proximal margin of  the field-of-view of this exam.       Impression:      1. Plantar-lateral anterior hindfoot, midfoot, proximal forefoot  nonenhancing collection and presumed infected collection/abscess  predominantly centered within the abductor digiting minimi muscle and  extends from the level of the central to anterior calcaneus distally to  the coronal level of the junction of the mid-third and distal-third of  the 5th metatarsal. The collection contacts the plantar aspect of the  peroneus longus tendon where there is presumed infectious tenosynovitis  and there is extensive surrounding myositis. Generalized foot myositis  and diffuse cellulitis.   2. Bone marrow edema and enhancement within the base of the 5th  metatarsal and to a lesser degree within the plantar aspect of the  cuboid due to reactive marrow edema or early osteomyelitis.  3. 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  4. Suspect peroneus brevis split tear at the proximal margin of  the  field-of-view of this exam.     This report was finalized on 8/6/2024 12:34 PM by Dr. Catalino Lopez M.D on Workstation: [id number]       XR Foot 3+ View Right [[id number]] Collected: 08/05/24 1820     Updated: 08/05/24 1825    Narrative:      XR FOOT 3+ VW RIGHT-     INDICATIONS: Redness and swelling, pain.     TECHNIQUE: 3 VIEWS OF THE RIGHT FOOT     COMPARISON: None available     FINDINGS:     No acute fracture, erosion, or dislocation is identified. If there is  further clinical concern, MRI or bone scan could be obtained for further  evaluation. A sclerotic focus in the second proximal phalanx, although  nonspecific, may represent bone island.       Impression:         As described.           This report was finalized on 8/5/2024 6:22 PM by Dr. Donnell Alfredo M.D on Workstation: [id number]               Pertinent Labs     Results from last 7 days   Lab Units 08/12/24  0444 08/10/24  0651 08/09/24  0426 08/08/24  0501   WBC 10*3/mm3 9.49 10.28 11.29* 17.27*   HEMOGLOBIN g/dL 10.6* 9.7* 10.4* 10.4*   PLATELETS 10*3/mm3 445 428 383 383     Results from last 7 days   Lab Units 08/13/24  0613 08/12/24  0444 08/11/24  0638 08/10/24  0651   SODIUM mmol/L 136 138 134* 135*   POTASSIUM mmol/L 4.1 4.0 3.9  3.9 3.4*   CHLORIDE mmol/L 97* 96* 95* 99   CO2 mmol/L 28.5 31.7* 30.0* 25.3   BUN mg/dL 10 9 8 5*   CREATININE mg/dL 0.66* 0.54* 0.50* 0.54*   GLUCOSE mg/dL 237* 205* 207* 156*   Estimated Creatinine Clearance: 165 mL/min (A) (by C-G formula based on SCr of 0.66 mg/dL (L)).  Results from last 7 days   Lab Units 08/08/24  0501 08/07/24  1938   ALBUMIN g/dL 3.0* 3.4*   BILIRUBIN mg/dL 0.6 0.7   ALK PHOS U/L 79 92   AST (SGOT) U/L 6 11   ALT (SGPT) U/L 7 6     Results from last 7 days   Lab Units 08/13/24  0613 08/12/24  0444 08/11/24  0638 08/10/24  0651 08/09/24  0426 08/08/24  2018 08/08/24  1656 08/08/24  1311 08/08/24  0835 08/08/24  0501 08/08/24  0051 08/07/24  1938   CALCIUM mg/dL 9.1 9.4 8.8 8.4*   <  "> 8.5* 8.7 8.9 7.7* 9.0  9.0   < > 9.1  9.1   ALBUMIN g/dL  --   --   --   --   --   --   --   --   --  3.0*  --  3.4*   MAGNESIUM mg/dL  --   --   --   --   --  1.8 1.7 1.7 1.6 1.7   < > 1.6  1.6   PHOSPHORUS mg/dL  --   --   --   --   --  2.2* 2.4* 2.7 2.9 3.4   < > 4.1  4.3    < > = values in this interval not displayed.       Results from last 7 days   Lab Units 08/08/24  0051 08/07/24  1938   HSTROP T ng/L 9 8           Invalid input(s): \"LDLCALC\"  Results from last 7 days   Lab Units 08/07/24  0803   WOUNDCX  Moderate growth (3+) Staphylococcus aureus, MRSA*       Test Results Pending at Discharge       Discharge Details        Discharge Medications        New Medications        Instructions Start Date   Insulin Pen Needle 32G X 4 MM misc   Use to inject insulin under the skin via pens up to 4 times daily      nicotine 21 MG/24HR patch  Commonly known as: NICODERM CQ   1 patch, Transdermal, Every 24 Hours Scheduled   Start Date: August 14, 2024     oxyCODONE 5 MG immediate release tablet  Commonly known as: ROXICODONE   5 mg, Oral, Every 4 Hours PRN      Tresiba FlexTouch 100 UNIT/ML solution pen-injector injection  Generic drug: insulin degludec   15 Units, Subcutaneous, Daily      vancomycin 1250 mg in sodium chloride 0.9% 250 mL (CD)   1,250 mg, Intravenous, Every 8 Hours             Continue These Medications        Instructions Start Date   FreeStyle Rochelle 14 Day [place] device   1 each, Does not apply, Take As Directed      sertraline 50 MG tablet  Commonly known as: Zoloft   50 mg, Oral, Daily               Allergies   Allergen Reactions    Pork-Derived Products Other (See Comments)     Dietary restriction     Latex Rash         Discharge Disposition:  Home or Self Care    Discharge Diet:  Diet Order   Procedures    Diet: Regular/House, Diabetic; Consistent Carbohydrate; Fluid Consistency: Thin (IDDSI 0)       Discharge Activity:       CODE STATUS:    Code Status and Medical Interventions: CPR " (Attempt to Resuscitate); Full Support   Ordered at: 08/05/24 2133     Level Of Support Discussed With:    Patient     Code Status (Patient has no pulse and is not breathing):    CPR (Attempt to Resuscitate)     Medical Interventions (Patient has pulse or is breathing):    Full Support       Future Appointments   Date Time Provider Department Center   9/3/2024  1:50 PM Rashid Saenz MD [facility]     Additional Instructions for the Follow-ups that You Need to Schedule       Ambulatory Referral to [facility] For Infusion Treatment   As directed       Place medication orders in the Therapy Plan section of Epic.     Medication orders must be placed in Therapy Plan section of Epic.    What is the duration of the infusion treatment?: .5 Hr   Treatment to be done at:  [facility] ([facility])               Follow-up Information       [facility]. Call.    Why: Recurrent dermatosis  Contact information:  [address] [place] [address]  [phone]             Provider, No Known .    Contact information:  [facility] [address] [phone]                             Additional Instructions for the Follow-ups that You Need to Schedule       Ambulatory Referral to [facility] For Infusion Treatment   As directed       Place medication orders in the Therapy Plan section of Epic.     Medication orders must be placed in Therapy Plan section of Epic.    What is the duration of the infusion treatment?: .5 Hr   Treatment to be done at:  [facility] ([facility])            Time Spent on Discharge:  Greater than 30 minutes      Andrea Pandya MD  [facility]  08/13/24  15:00 EDT

## 2024-08-13 NOTE — OUTREACH NOTE
Prep Survey      Flowsheet Row Responses   [facility] facility patient discharged from? [facility]   Is LACE score < 7 ? No   Eligibility Readm Mgmt   Discharge diagnosis Right foot wound debridement and delayed primary wound closure*Soft tissue infection   Sepsis (A41.9)   Does the patient have one of the following disease processes/diagnoses(primary or secondary)? Sepsis   Does the patient have Home health ordered? Yes   Is there a DME ordered? No   Prep survey completed? Yes            SEFERINO RAZO - Registered Nurse

## 2024-08-13 NOTE — TELEPHONE ENCOUNTER
Patient called and he got all of his other meds at discharge at [facility] except the Tresiba insulin pen.  It was not ready or called in per pharmacy.    I reviewed the AVS discharge summary, I reordered the insulin pen to go to [facility].  When I called [facility] to seei f they received it, they were closed already.  Pt notified and he will call first thing in the morning to pharmacy.  On my end it seems like it went through.  Pt to call back if he has any other issues or concerns.

## 2024-08-13 NOTE — PROGRESS NOTES
Name: Shayy Ellison ADMIT: 2024   : 1980  PCP: Provider, No Known    MRN: [id number] LOS: 7 days   AGE/SEX: 43 y.o. male  ROOM: [facility]     Subjective     Asleep but arousable.  States that his leg pain is 8 out of 10.  Vitals are stable.  Heart rate is normal.    Objective   Objective   Vital Signs  Temp:  [98.2 °F (36.8 °C)-98.6 °F (37 °C)] 98.4 °F (36.9 °C)  Heart Rate:  [77-90] 79  Resp:  [16] 16  BP: (123-133)/(80-85) 125/84  SpO2:  [94 %-97 %] 96 %  on   ;   Device (Oxygen Therapy): room air  Body mass index is 26.12 kg/m².  Physical Exam  Constitutional:       General: He is not in acute distress.  HENT:      Head: Normocephalic and atraumatic.   Eyes:      Extraocular Movements: Extraocular movements intact.      Pupils: Pupils are equal, round, and reactive to light.   Cardiovascular:      Rate and Rhythm: Normal rate and regular rhythm.   Pulmonary:      Effort: Pulmonary effort is normal. No respiratory distress.   Abdominal:      General: There is no distension.      Palpations: Abdomen is soft.      Tenderness: There is no abdominal tenderness.   Musculoskeletal:         General: No swelling. Normal range of motion.      Comments: The right foot is bandaged   Skin:     General: Skin is warm and dry.   Neurological:      Mental Status: He is alert and oriented to person, place, and time.      Cranial Nerves: No cranial nerve deficit.       Exam reviewed and updated on 2024    Results Review     I reviewed the patient's new clinical results.  Results from last 7 days   Lab Units 24  0444 08/10/24  0651 24  0426 24  0501   WBC 10*3/mm3 9.49 10.28 11.29* 17.27*   HEMOGLOBIN g/dL 10.6* 9.7* 10.4* 10.4*   PLATELETS 10*3/mm3 445 428 383 383     Results from last 7 days   Lab Units 24  0613 24  0444 24  0638 08/10/24  0651   SODIUM mmol/L 136 138 134* 135*   POTASSIUM mmol/L 4.1 4.0 3.9  3.9 3.4*   CHLORIDE mmol/L 97* 96* 95* 99   CO2 mmol/L 28.5 31.7*  "30.0* 25.3   BUN mg/dL 10 9 8 5*   CREATININE mg/dL 0.66* 0.54* 0.50* 0.54*   GLUCOSE mg/dL 237* 205* 207* 156*   Estimated Creatinine Clearance: 165 mL/min (A) (by C-G formula based on SCr of 0.66 mg/dL (L)).  Results from last 7 days   Lab Units 08/08/24  0501 08/07/24  1938   ALBUMIN g/dL 3.0* 3.4*   BILIRUBIN mg/dL 0.6 0.7   ALK PHOS U/L 79 92   AST (SGOT) U/L 6 11   ALT (SGPT) U/L 7 6     Results from last 7 days   Lab Units 08/13/24  0613 08/12/24  0444 08/11/24  0638 08/10/24  0651 08/09/24  0426 08/08/24  2018 08/08/24  1656 08/08/24  1311 08/08/24  0835 08/08/24  0501 08/08/24  0051 08/07/24  1938   CALCIUM mg/dL 9.1 9.4 8.8 8.4*   < > 8.5* 8.7 8.9 7.7* 9.0  9.0   < > 9.1  9.1   ALBUMIN g/dL  --   --   --   --   --   --   --   --   --  3.0*  --  3.4*   MAGNESIUM mg/dL  --   --   --   --   --  1.8 1.7 1.7 1.6 1.7   < > 1.6  1.6   PHOSPHORUS mg/dL  --   --   --   --   --  2.2* 2.4* 2.7 2.9 3.4   < > 4.1  4.3    < > = values in this interval not displayed.     Results from last 7 days   Lab Units 08/07/24  1938   LACTATE mmol/L 0.7   No results found for: \"COVID19\"  Glucose   Date/Time Value Ref Range Status   08/13/2024 0733 237 (H) 70 - 130 mg/dL Final   08/12/2024 2136 282 (H) 70 - 130 mg/dL Final   08/12/2024 1714 310 (H) 70 - 130 mg/dL Final   08/12/2024 1108 272 (H) 70 - 130 mg/dL Final   08/12/2024 0801 226 (H) 70 - 130 mg/dL Final   08/11/2024 2109 255 (H) 70 - 130 mg/dL Final   08/11/2024 1629 252 (H) 70 - 130 mg/dL Final     Results for orders placed or performed during the hospital encounter of 08/05/24   Blood Culture - Blood, Arm, Left    Specimen: Arm, Left; Blood   Result Value Ref Range    Blood Culture No growth at 5 days          XR Chest 1 View  Narrative: XR CHEST 1 VW-     HISTORY: Male who is 43 years-old, possible fluid overload     TECHNIQUE: Frontal view of the chest     COMPARISON: None available     FINDINGS: Heart, mediastinum and pulmonary vasculature are unremarkable.  Small " atelectasis or scarring at the left base. No focal pulmonary  consolidation, pleural effusion, or pneumothorax. No acute osseous  process.     Impression: Small likely scarring or atelectasis at the left base. No  focal pulmonary consolidation or edema. Follow-up as clinical  indications persist.     This report was finalized on 8/7/2024 7:38 PM by Dr. Donnell Alfredo M.D on Workstation: [id number]       Scheduled Medications  insulin glargine, 20 Units, Subcutaneous, Nightly  insulin lispro, 2-7 Units, Subcutaneous, 4x Daily AC & at Bedtime  nicotine, 1 patch, Transdermal, Q24H  sertraline, 50 mg, Oral, Daily  sodium chloride, 10 mL, Intravenous, Q12H  sodium chloride, 10 mL, Intravenous, Q12H  vancomycin, 1,250 mg, Intravenous, Q8H    Infusions  lactated ringers, 9 mL/hr, Last Rate: 9 mL/hr (08/07/24 0725)  lactated ringers, 9 mL/hr, Last Rate: 9 mL/hr (08/09/24 0723)  Pharmacy to dose vancomycin,     Diet  Diet: Regular/House, Diabetic; Consistent Carbohydrate; Fluid Consistency: Thin (IDDSI 0)       Assessment/Plan     Active Hospital Problems    Diagnosis  POA    **Soft tissue infection [L08.9]  Yes    Sepsis [A41.9]  Unknown    Diabetic foot infection [E11.628, L08.9]  Yes    Cellulitis of right foot [L03.115]  Unknown    Abscess of right foot [L02.611]  Unknown      Resolved Hospital Problems   No resolved problems to display.       43 y.o. male admitted with Soft tissue infection.    Diabetic ketoacidosis  Resolved.    Type 1 diabetes  A1c is 12.5.  He will require insulin at discharge.  He also reports that he does not have a PCP. Discontinue glucommander and start glargine 15u qhs and monitor blood sugars. Increase lantus to 20u qhs    Osteomyelitis  Right foot abscess  Wcx with MRSA. On Vancomycin. Cefepime discontinued. .  Status post I&D along with biopsy of right fifth metatarsal.  Being treated for acute osteomyelitis with 4 weeks of vancomycin.  PICC line placed     Disposition is challenging.  He  does not have a primary care provider.  Will ask CCP to assist with IV antibiotics and home health. Apparently he will have to pay about $200/week for IV abx which might be an issue. He is not eligible fro medicaid.  They also cannot do daily infusion site visits.    Full code          Andrea Pandya MD  [facility]  08/13/24  10:53 EDT

## 2024-08-13 NOTE — PLAN OF CARE
Goal Outcome Evaluation:  Plan of Care Reviewed With: patient   A&Ox4, VSS, RA, up ad vicki w/ crutches- RLE NWB, Rt foot dressing remains C/D/I, noncompliant w/ consistent carb diet- [facility] BG checks w/ coverage, PRN dilaudid & juliet for pain, working on DC plan r/t abx needs, PICC line IVL w/ abx, no major issues this shift & pt slept well, plan of care continued, WCTM      Progress: no change

## 2024-08-14 ENCOUNTER — READMISSION MANAGEMENT (OUTPATIENT)
Dept: CALL CENTER | Facility: HOSPITAL | Age: 44
End: 2024-08-14

## 2024-08-14 DIAGNOSIS — M86.8X7 OTHER OSTEOMYELITIS OF RIGHT FOOT: Primary | ICD-10-CM

## 2024-08-14 PROBLEM — M86.9 OSTEOMYELITIS: Status: ACTIVE | Noted: 2024-08-14

## 2024-08-14 RX ORDER — SODIUM CHLORIDE 0.9 % (FLUSH) 0.9 %
20 SYRINGE (ML) INJECTION AS NEEDED
Status: CANCELLED | OUTPATIENT
Start: 2024-08-14

## 2024-08-14 RX ORDER — SODIUM CHLORIDE 0.9 % (FLUSH) 0.9 %
10 SYRINGE (ML) INJECTION AS NEEDED
Status: CANCELLED | OUTPATIENT
Start: 2024-08-14

## 2024-08-14 NOTE — OUTREACH NOTE
Sepsis Week 1 Survey      Flowsheet Row Responses   [facility] patient discharged from? [facility]   Does the patient have one of the following disease processes/diagnoses(primary or secondary)? Sepsis   Week 1 attempt successful? Yes   Call start time 0941   Call end time 0943   Discharge diagnosis Right foot wound debridement and delayed primary wound closure*Soft tissue infection   Sepsis   Medication alerts for this patient Home IV antibiotic infusion   Meds reviewed with patient/caregiver? Yes   Is the patient having any side effects they believe may be caused by any medication additions or changes? No   Does the patient have all medications related to this admission filled (includes all antibiotics, inhalers, nebulizers,steroids,etc.) Yes   Is the patient taking all medications as directed (includes completed medication regime)? Yes   Comments regarding appointments Follow up with ID on 9/3   Does the patient have a primary care provider?  Yes   Comments regarding PCP states he does not have a PCP currently   Does the patient have an appointment with their PCP within 7 days of discharge? No   Nursing Interventions Educated patient on importance of making appointment   Has the patient kept scheduled appointments due by today? N/A   Has home health visited the patient within 72 hours of discharge? N/A   Psychosocial issues? No   Did the patient receive a copy of their discharge instructions? Yes   Nursing interventions Reviewed instructions with patient   What is the patient's perception of their health status since discharge? Improving   Nursing interventions Nurse provided patient education   Is the patient/caregiver able to teach back TIME? T emperature - higher or lower than normal, I nfection - may have signs and symptoms of an infection, M ental Decline - confused, sleepy, difficult to arouse, E xtremely Ill - severe pain, discomfort, shortness of breath   Nursing interventions Nurse provided patient  education   Is the patient/caregiver able to teach back signs and symptoms of worsening condition: Fever   Is the patient/caregiver able to teach back the hierarchy of who to call/visit for symptoms/problems? PCP, Specialist, Home health nurse, Urgent Care, ED, 911 Yes   Week 1 call completed? Yes   Is the patient interested in additional calls from an ambulatory ? No   Would this patient benefit from a Referral to [facility] Social Work? No   Wrap up additional comments Doing well, completing his IV antibiotic infusions at home without issues, confirmed upcoming appt with ID for 9/3.   Call end time 0943            Lisa MONTOYA - Registered Nurse

## 2024-08-14 NOTE — CASE MANAGEMENT/SOCIAL WORK
Continued Stay Note  [facility]     Patient Name: Shayy Ellison  MRN: [id number]  Today's Date: 8/14/2024    Admit Date: 8/5/2024    Plan: Home with [facility] for picc line care and lab work   Discharge Plan       Row Name 08/14/24 1610       Plan    Plan Home with [facility] for picc line care and lab work    Plan Comments Order put in to [facility] picc line care and labs work by Dr. Whipple. [facility] called the wife to let her know he is due on monday for the labs and care. Daylin with scheduling set him up with appointments on 8/19 @ 1p, 8/26 @ 12, 9/3 @ 8:30. CCP called the wife and gave her the dates and times. She wrote them down and said they would be there. Spouse asked questions about insulin. CCP reached out to the diabietes educator to see if she could reach out to the patient and answer questions.      Row Name 08/14/24 1504                                    Discharge Codes    No documentation.                 Expected Discharge Date and Time       Expected Discharge Date Expected Discharge Time    Aug 13, 2024

## 2024-08-14 NOTE — CASE MANAGEMENT/SOCIAL WORK
Case Management Discharge Note      Final Note: home with [facility]    Provided Post Acute Provider List?: N/A  Provided Post Acute Provider Quality & Resource List?: N/A    Selected Continued Care - Discharged on 8/13/2024 Admission date: 8/5/2024 - Discharge disposition: Home or Self Care      Destination    No services have been selected for the patient.                Durable Medical Equipment    No services have been selected for the patient.                Dialysis/Infusion    No services have been selected for the patient.                Home Medical Care    No services have been selected for the patient.                Therapy    No services have been selected for the patient.                Community Resources    No services have been selected for the patient.                Community & DME    No services have been selected for the patient.                    Transportation Services  Private: Car    Final Discharge Disposition Code: 01 - home or self-care

## 2024-08-14 NOTE — PROGRESS NOTES
"Enter Query Response Below      Query Response: SIRS criteria 2/4, sepsis diagnosis supported             If applicable, please update the problem list.       Patient: EkCristian fengin \"[place]\"        : 1980  Account: [id number]           Admit Date:         Dr Freeman,  Thank you for your response to the CDI query, however, your electronic signature did not populate with your reply.   When responding to queries sent by CDI, the preferred method is to respond with a new note. By responding with a new note, the note will include your query response and electronic signature.    How to Respond to this query:       a. Click New Note     b. Answer query within the yellow box.                c. Update the Problem List, if applicable.    From: Alexander Freeman DPM  Sent: 2024   2:36 PM EDT  To: Natali Marquez  Subject: RE: CDI Query                                        Patient had SIRS 2/4, sepsis is supported     ----- Message -----  From: Natali Marquez  Sent: 8/10/2024   8:19 AM EDT  To: Andrea Pandya MD  Subject: CDI Query                                            Patient: EkCristian fengin \"[place]\"        : 1980  Account: [id number]           Admit Date:                                                     How to Respond to this query:                   a. Click New Note                b. Answer query within the yellow box.                c. Update the Problem List, if applicable.        If you have any questions about this query contact me at: [email or web address]      ,     43 year old male admitted  with diabetic ketoacidosis and right foot osteomyelitis with abscess/cellulitis. Sepsis is listed as a diagnosis in ED progress note , Pulmonology notes   - , and listed as Active Hospital Problem beginning .   Clinical findings: heart rate 129, WBC 13.56, Lactate 1.4, temperature 98.4     Treatment: wound debridement, IV fluids, IV antibiotics     After study, is sepsis " clinically supported during this admission?     ? Sepsis is supported with additional clinical indicators:____________  ? Sepsis is not supported  ? Other- specify_____________  ? Unable to determine        By submitting this query, we are merely seeking further clarification of documentation to accurately reflect all conditions that you are monitoring, evaluating, treating or that extend the hospitalization or utilize additional resources of care. Please utilize your independent clinical judgment when addressing the question(s) above.      This query and your response, once completed, will be entered into the legal medical record.     Sincerely,  Natali Marquez RN CCDS  Clinical Documentation Integrity Program

## 2024-08-15 NOTE — CASE MANAGEMENT/SOCIAL WORK
Continued Stay Note  [facility]     Patient Name: Shayy Ellison  MRN: [id number]  Today's Date: 8/15/2024    Admit Date: 8/5/2024    Plan: Home with [facility] for picc line care and lab work   Discharge Plan       Row Name 08/15/24 0934       Plan    Plan Comments CCP called the Diabetes Educator and they stated that they will give the wife a call to answer her questions about the insulin.                   Discharge Codes    No documentation.                 Expected Discharge Date and Time       Expected Discharge Date Expected Discharge Time    Aug 13, 2024

## 2024-08-19 ENCOUNTER — HOSPITAL ENCOUNTER (OUTPATIENT)
Dept: INFUSION THERAPY | Facility: HOSPITAL | Age: 44
Discharge: HOME | End: 2024-08-19
Admitting: INTERNAL MEDICINE

## 2024-08-19 VITALS
HEART RATE: 95 BPM | OXYGEN SATURATION: 100 % | RESPIRATION RATE: 18 BRPM | TEMPERATURE: 97.5 F | DIASTOLIC BLOOD PRESSURE: 76 MMHG | SYSTOLIC BLOOD PRESSURE: 119 MMHG

## 2024-08-19 DIAGNOSIS — M86.8X7 OTHER OSTEOMYELITIS OF RIGHT FOOT: Primary | ICD-10-CM

## 2024-08-19 LAB
ALBUMIN SERPL-MCNC: 4 G/DL (ref 3.5–5.2)
ALBUMIN/GLOB SERPL: 1.1 G/DL
ALP SERPL-CCNC: 67 U/L (ref 39–117)
ALT SERPL W P-5'-P-CCNC: 12 U/L (ref 1–41)
ANION GAP SERPL CALCULATED.3IONS-SCNC: 10.1 MMOL/L (ref 5–15)
AST SERPL-CCNC: 14 U/L (ref 1–40)
BASOPHILS # BLD AUTO: 0.09 10*3/MM3 (ref 0–0.2)
BASOPHILS NFR BLD AUTO: 1.1 % (ref 0–1.5)
BILIRUB SERPL-MCNC: 0.5 MG/DL (ref 0–1.2)
BUN SERPL-MCNC: 14 MG/DL (ref 6–20)
BUN/CREAT SERPL: 18.4 (ref 7–25)
CALCIUM SPEC-SCNC: 9.3 MG/DL (ref 8.6–10.5)
CHLORIDE SERPL-SCNC: 98 MMOL/L (ref 98–107)
CO2 SERPL-SCNC: 24.9 MMOL/L (ref 22–29)
CREAT SERPL-MCNC: 0.76 MG/DL (ref 0.76–1.27)
CRP SERPL-MCNC: 0.8 MG/DL (ref 0–0.5)
DEPRECATED RDW RBC AUTO: 39.4 FL (ref 37–54)
EGFRCR SERPLBLD CKD-EPI 2021: 115.1 ML/MIN/1.73
EOSINOPHIL # BLD AUTO: 0.46 10*3/MM3 (ref 0–0.4)
EOSINOPHIL NFR BLD AUTO: 5.8 % (ref 0.3–6.2)
ERYTHROCYTE [DISTWIDTH] IN BLOOD BY AUTOMATED COUNT: 13.7 % (ref 12.3–15.4)
GLOBULIN UR ELPH-MCNC: 3.5 GM/DL
GLUCOSE SERPL-MCNC: 195 MG/DL (ref 65–99)
HCT VFR BLD AUTO: 38.7 % (ref 37.5–51)
HGB BLD-MCNC: 12.4 G/DL (ref 13–17.7)
IMM GRANULOCYTES # BLD AUTO: 0.06 10*3/MM3 (ref 0–0.05)
IMM GRANULOCYTES NFR BLD AUTO: 0.8 % (ref 0–0.5)
LYMPHOCYTES # BLD AUTO: 2.33 10*3/MM3 (ref 0.7–3.1)
LYMPHOCYTES NFR BLD AUTO: 29.4 % (ref 19.6–45.3)
MCH RBC QN AUTO: 25.7 PG (ref 26.6–33)
MCHC RBC AUTO-ENTMCNC: 32 G/DL (ref 31.5–35.7)
MCV RBC AUTO: 80.1 FL (ref 79–97)
MONOCYTES # BLD AUTO: 0.5 10*3/MM3 (ref 0.1–0.9)
MONOCYTES NFR BLD AUTO: 6.3 % (ref 5–12)
NEUTROPHILS NFR BLD AUTO: 4.48 10*3/MM3 (ref 1.7–7)
NEUTROPHILS NFR BLD AUTO: 56.6 % (ref 42.7–76)
NRBC BLD AUTO-RTO: 0 /100 WBC (ref 0–0.2)
PLATELET # BLD AUTO: 454 10*3/MM3 (ref 140–450)
PMV BLD AUTO: 8.3 FL (ref 6–12)
POTASSIUM SERPL-SCNC: 4.1 MMOL/L (ref 3.5–5.2)
PROT SERPL-MCNC: 7.5 G/DL (ref 6–8.5)
RBC # BLD AUTO: 4.83 10*6/MM3 (ref 4.14–5.8)
SODIUM SERPL-SCNC: 133 MMOL/L (ref 136–145)
VANCOMYCIN SERPL-MCNC: 7.9 MCG/ML (ref 5–40)
WBC NRBC COR # BLD AUTO: 7.92 10*3/MM3 (ref 3.4–10.8)

## 2024-08-19 PROCEDURE — 80202 ASSAY OF VANCOMYCIN: CPT | Performed by: INTERNAL MEDICINE

## 2024-08-19 PROCEDURE — 86140 C-REACTIVE PROTEIN: CPT | Performed by: INTERNAL MEDICINE

## 2024-08-19 PROCEDURE — G0463 HOSPITAL OUTPT CLINIC VISIT: HCPCS

## 2024-08-19 PROCEDURE — 85025 COMPLETE CBC W/AUTO DIFF WBC: CPT | Performed by: INTERNAL MEDICINE

## 2024-08-19 PROCEDURE — 36592 COLLECT BLOOD FROM PICC: CPT

## 2024-08-19 PROCEDURE — 80053 COMPREHEN METABOLIC PANEL: CPT | Performed by: INTERNAL MEDICINE

## 2024-08-19 RX ORDER — SODIUM CHLORIDE 0.9 % (FLUSH) 0.9 %
10 SYRINGE (ML) INJECTION AS NEEDED
Status: CANCELLED | OUTPATIENT
Start: 2024-08-19

## 2024-08-19 RX ORDER — SODIUM CHLORIDE 0.9 % (FLUSH) 0.9 %
20 SYRINGE (ML) INJECTION AS NEEDED
Status: DISCONTINUED | OUTPATIENT
Start: 2024-08-19 | End: 2024-08-21 | Stop reason: HOSPADM

## 2024-08-19 RX ORDER — SODIUM CHLORIDE 0.9 % (FLUSH) 0.9 %
10 SYRINGE (ML) INJECTION AS NEEDED
Status: DISCONTINUED | OUTPATIENT
Start: 2024-08-19 | End: 2024-08-21 | Stop reason: HOSPADM

## 2024-08-19 RX ORDER — SODIUM CHLORIDE 0.9 % (FLUSH) 0.9 %
20 SYRINGE (ML) INJECTION AS NEEDED
Status: CANCELLED | OUTPATIENT
Start: 2024-08-19

## 2024-08-19 NOTE — PROGRESS NOTES
NANCY PICC dressing changed per protocol and labs obtained per MD order. Left message with CCP/nurse, patient and wife has questions r/t right foot follow up care. Discharged per ambulation/crutches with spouse.

## 2024-08-22 ENCOUNTER — READMISSION MANAGEMENT (OUTPATIENT)
Dept: CALL CENTER | Facility: HOSPITAL | Age: 44
End: 2024-08-22

## 2024-08-22 ENCOUNTER — TELEPHONE (OUTPATIENT)
Dept: INFECTIOUS DISEASES | Facility: CLINIC | Age: 44
End: 2024-08-22

## 2024-08-22 DIAGNOSIS — M86.8X7 OTHER OSTEOMYELITIS OF RIGHT FOOT: Primary | ICD-10-CM

## 2024-08-22 RX ORDER — HEPARIN SODIUM (PORCINE) LOCK FLUSH IV SOLN 100 UNIT/ML 100 UNIT/ML
300 SOLUTION INTRAVENOUS ONCE
OUTPATIENT
Start: 2024-08-22

## 2024-08-22 RX ORDER — HEPARIN SODIUM (PORCINE) LOCK FLUSH IV SOLN 100 UNIT/ML 100 UNIT/ML
500 SOLUTION INTRAVENOUS AS NEEDED
OUTPATIENT
Start: 2024-08-22

## 2024-08-22 RX ORDER — SODIUM CHLORIDE 0.9 % (FLUSH) 0.9 %
20 SYRINGE (ML) INJECTION AS NEEDED
Status: CANCELLED | OUTPATIENT
Start: 2024-08-22

## 2024-08-22 RX ORDER — SODIUM CHLORIDE 0.9 % (FLUSH) 0.9 %
10 SYRINGE (ML) INJECTION AS NEEDED
Status: CANCELLED | OUTPATIENT
Start: 2024-08-22

## 2024-08-22 NOTE — OUTREACH NOTE
Sepsis Week 2 Survey      Flowsheet Row Responses   [facility] patient discharged from? [place]   Does the patient have one of the following disease processes/diagnoses(primary or secondary)? Sepsis   Week 2 attempt successful? Yes   Call start time 1608   Call end time 1611   Discharge diagnosis Right foot wound debridement and delayed primary wound closure*Soft tissue infection   Sepsis   Is patient permission given to speak with other caregiver? Yes   List who call center can speak with S/O   Person spoke with today (if not patient) and relationship S/O   Medication alerts for this patient Home IV antibiotic infusion   Meds reviewed with patient/caregiver? Yes   Is the patient taking all medications as directed (includes completed medication regime)? Yes   Has the patient kept scheduled appointments due by today? Yes   What is the patient's perception of their health status since discharge? Improving   Is patient/caregiver able to teach back steps to recovery at home? Set small, achievable goals for return to baseline health, Rest and regain strength, Talk about feelings with family/friends, Make a list of questions for PCP appoinment, Exercise as tolerated, Eat a balanced diet   If the patient is a current smoker, are they able to teach back resources for cessation? [id number]   Is the patient/caregiver able to teach back the hierarchy of who to call/visit for symptoms/problems? PCP, Specialist, Home health nurse, Urgent Care, ED, 911 Yes   Week 2 call completed? Yes   Call end time 1611            Teagan H - Registered Nurse

## 2024-08-22 NOTE — TELEPHONE ENCOUNTER
Natali RN with [facility] calling to get new orders placed for this patient's weekly PICC line dressing change. For some reason, these orders  and he is not done with his ABX until 9/3/24. (FYI...Look under CVAD for therapy plan if you can't find where to order the line care). ED MIR

## 2024-08-23 ENCOUNTER — TELEPHONE (OUTPATIENT)
Dept: INFECTIOUS DISEASES | Facility: CLINIC | Age: 44
End: 2024-08-23

## 2024-08-23 NOTE — TELEPHONE ENCOUNTER
----- Message from Radha BURKETT sent at 8/22/2024  4:17 PM EDT -----  Regarding: results  Patient called and had questions about lab results. I am unsure what to do cause I ant give results and it look like Duncan ordered them but there is notes from jacquelin too so I don't know which md to sent it to either...... Help I'm dumb    Thank you

## 2024-08-23 NOTE — TELEPHONE ENCOUNTER
Patient was questioning his lab results and wondering about his blood sugar. I informed him his glucose was lower than it has been but still elevated at 195. Encouraged him to follow closely with his PCP and avoid sugar/starch in diet. He also complains of pain in foot and was requesting refill on his Oxycodone. I informed him to contact his foot surgeon, Dr. Freeman (I provided him with the phone number) and discuss his pain meds with them. Patient verbalized understanding. CLARKE, RN

## 2024-08-26 ENCOUNTER — TELEPHONE (OUTPATIENT)
Dept: INFECTIOUS DISEASES | Facility: CLINIC | Age: 44
End: 2024-08-26

## 2024-08-26 ENCOUNTER — HOSPITAL ENCOUNTER (OUTPATIENT)
Dept: INFUSION THERAPY | Facility: HOSPITAL | Age: 44
Discharge: HOME | End: 2024-08-26
Admitting: INTERNAL MEDICINE

## 2024-08-26 VITALS
TEMPERATURE: 97.7 F | RESPIRATION RATE: 18 BRPM | OXYGEN SATURATION: 99 % | HEART RATE: 80 BPM | DIASTOLIC BLOOD PRESSURE: 76 MMHG | SYSTOLIC BLOOD PRESSURE: 113 MMHG

## 2024-08-26 DIAGNOSIS — M86.8X7 OTHER OSTEOMYELITIS OF RIGHT FOOT: ICD-10-CM

## 2024-08-26 DIAGNOSIS — L02.611 ABSCESS OF RIGHT FOOT: Primary | ICD-10-CM

## 2024-08-26 LAB
ALBUMIN SERPL-MCNC: 4 G/DL (ref 3.5–5.2)
ALBUMIN/GLOB SERPL: 1.4 G/DL
ALP SERPL-CCNC: 56 U/L (ref 39–117)
ALT SERPL W P-5'-P-CCNC: 16 U/L (ref 1–41)
ANION GAP SERPL CALCULATED.3IONS-SCNC: 11.6 MMOL/L (ref 5–15)
AST SERPL-CCNC: 14 U/L (ref 1–40)
BASOPHILS # BLD AUTO: 0.05 10*3/MM3 (ref 0–0.2)
BASOPHILS NFR BLD AUTO: 1 % (ref 0–1.5)
BILIRUB SERPL-MCNC: 0.8 MG/DL (ref 0–1.2)
BUN SERPL-MCNC: 18 MG/DL (ref 6–20)
BUN/CREAT SERPL: 23.7 (ref 7–25)
CALCIUM SPEC-SCNC: 9 MG/DL (ref 8.6–10.5)
CHLORIDE SERPL-SCNC: 101 MMOL/L (ref 98–107)
CO2 SERPL-SCNC: 23.4 MMOL/L (ref 22–29)
CREAT SERPL-MCNC: 0.76 MG/DL (ref 0.76–1.27)
CRP SERPL-MCNC: 0.32 MG/DL (ref 0–0.5)
DEPRECATED RDW RBC AUTO: 41.9 FL (ref 37–54)
EGFRCR SERPLBLD CKD-EPI 2021: 115.1 ML/MIN/1.73
EOSINOPHIL # BLD AUTO: 0.38 10*3/MM3 (ref 0–0.4)
EOSINOPHIL NFR BLD AUTO: 7.6 % (ref 0.3–6.2)
ERYTHROCYTE [DISTWIDTH] IN BLOOD BY AUTOMATED COUNT: 14.3 % (ref 12.3–15.4)
GLOBULIN UR ELPH-MCNC: 2.9 GM/DL
GLUCOSE SERPL-MCNC: 285 MG/DL (ref 65–99)
HCT VFR BLD AUTO: 36.9 % (ref 37.5–51)
HGB BLD-MCNC: 11.6 G/DL (ref 13–17.7)
IMM GRANULOCYTES # BLD AUTO: 0.01 10*3/MM3 (ref 0–0.05)
IMM GRANULOCYTES NFR BLD AUTO: 0.2 % (ref 0–0.5)
LYMPHOCYTES # BLD AUTO: 1.82 10*3/MM3 (ref 0.7–3.1)
LYMPHOCYTES NFR BLD AUTO: 36.3 % (ref 19.6–45.3)
MCH RBC QN AUTO: 25.6 PG (ref 26.6–33)
MCHC RBC AUTO-ENTMCNC: 31.4 G/DL (ref 31.5–35.7)
MCV RBC AUTO: 81.3 FL (ref 79–97)
MONOCYTES # BLD AUTO: 0.39 10*3/MM3 (ref 0.1–0.9)
MONOCYTES NFR BLD AUTO: 7.8 % (ref 5–12)
NEUTROPHILS NFR BLD AUTO: 2.36 10*3/MM3 (ref 1.7–7)
NEUTROPHILS NFR BLD AUTO: 47.1 % (ref 42.7–76)
NRBC BLD AUTO-RTO: 0 /100 WBC (ref 0–0.2)
PLATELET # BLD AUTO: 327 10*3/MM3 (ref 140–450)
PMV BLD AUTO: 8.8 FL (ref 6–12)
POTASSIUM SERPL-SCNC: 4 MMOL/L (ref 3.5–5.2)
PROT SERPL-MCNC: 6.9 G/DL (ref 6–8.5)
RBC # BLD AUTO: 4.54 10*6/MM3 (ref 4.14–5.8)
SODIUM SERPL-SCNC: 136 MMOL/L (ref 136–145)
VANCOMYCIN SERPL-MCNC: 13 MCG/ML (ref 5–40)
WBC NRBC COR # BLD AUTO: 5.01 10*3/MM3 (ref 3.4–10.8)

## 2024-08-26 PROCEDURE — 85025 COMPLETE CBC W/AUTO DIFF WBC: CPT | Performed by: INTERNAL MEDICINE

## 2024-08-26 PROCEDURE — 36592 COLLECT BLOOD FROM PICC: CPT

## 2024-08-26 PROCEDURE — 80053 COMPREHEN METABOLIC PANEL: CPT | Performed by: INTERNAL MEDICINE

## 2024-08-26 PROCEDURE — 80202 ASSAY OF VANCOMYCIN: CPT | Performed by: INTERNAL MEDICINE

## 2024-08-26 PROCEDURE — 86140 C-REACTIVE PROTEIN: CPT | Performed by: INTERNAL MEDICINE

## 2024-08-26 PROCEDURE — G0463 HOSPITAL OUTPT CLINIC VISIT: HCPCS

## 2024-08-26 RX ORDER — SODIUM CHLORIDE 0.9 % (FLUSH) 0.9 %
10 SYRINGE (ML) INJECTION AS NEEDED
OUTPATIENT
Start: 2024-09-02

## 2024-08-26 RX ORDER — SODIUM CHLORIDE 0.9 % (FLUSH) 0.9 %
20 SYRINGE (ML) INJECTION AS NEEDED
OUTPATIENT
Start: 2024-09-02

## 2024-08-26 RX ORDER — SODIUM CHLORIDE 0.9 % (FLUSH) 0.9 %
10 SYRINGE (ML) INJECTION AS NEEDED
Status: DISCONTINUED | OUTPATIENT
Start: 2024-08-26 | End: 2024-08-28 | Stop reason: HOSPADM

## 2024-08-26 RX ORDER — HEPARIN SODIUM (PORCINE) LOCK FLUSH IV SOLN 100 UNIT/ML 100 UNIT/ML
300 SOLUTION INTRAVENOUS ONCE
OUTPATIENT
Start: 2024-09-02

## 2024-08-26 RX ORDER — SODIUM CHLORIDE 0.9 % (FLUSH) 0.9 %
20 SYRINGE (ML) INJECTION AS NEEDED
Status: DISCONTINUED | OUTPATIENT
Start: 2024-08-26 | End: 2024-08-28 | Stop reason: HOSPADM

## 2024-08-26 RX ORDER — HEPARIN SODIUM (PORCINE) LOCK FLUSH IV SOLN 100 UNIT/ML 100 UNIT/ML
500 SOLUTION INTRAVENOUS AS NEEDED
OUTPATIENT
Start: 2024-09-02

## 2024-08-26 RX ADMIN — Medication 20 ML: at 09:43

## 2024-08-26 NOTE — TELEPHONE ENCOUNTER
PATIENT CALLED REQUESTING LAB RESULTS WHICH HE WAS INFORMED WE DO NOT GIVE THOSE RESULTS OVER THE PHONE THAT THE MD WOULD REVIEW THEM AND GO OVER RESULTS AT THAT TIME. PATIENT STATED WIFE HAD CALLED LAST WEEK AS WELL WHICH WAS A CALL THAT I ALSO TOOK AND SHE WAS INFORMED SHE WAS NOT ON HIS VERBAL CONSENT WITH OUR OFFICE SO WE COULD NOT RELEASE ANY INFORMATION TO HER AND ALSO INFORMED HER AT THAT TIME AS WELL THAT WE DO NOT GIVE RESULT OVER THE PHONE. PATIENT WAS AGREEABLE AND STATED HE WOULD COMPLETE VERBAL RELEASE AT APPOINTMENT ON 9/3/24 WHEN HE WAS SCHEDULED FOR FOLLOW UP.

## 2024-08-26 NOTE — PROGRESS NOTES
NANCY PICC dressing changed per protocol and labs obtained per MD order. During dressing change, patient and spouse refused to wear masks. Patient turned head to opposite side during dressing change and spouse stood on other side of room. Discharged per ambulation/crutches with spouse.

## 2024-08-27 ENCOUNTER — OFFICE VISIT (OUTPATIENT)
Dept: WOUND CARE | Facility: HOSPITAL | Age: 44
End: 2024-08-27

## 2024-08-27 PROCEDURE — G0463 HOSPITAL OUTPT CLINIC VISIT: HCPCS

## 2024-08-29 ENCOUNTER — READMISSION MANAGEMENT (OUTPATIENT)
Dept: CALL CENTER | Facility: HOSPITAL | Age: 44
End: 2024-08-29

## 2024-08-29 NOTE — OUTREACH NOTE
Sepsis Week 3 Survey      Flowsheet Row Responses   [facility] patient discharged from? [place]   Does the patient have one of the following disease processes/diagnoses(primary or secondary)? Sepsis   Week 3 attempt successful? Yes   Call start time 1542   Call end time 1550   Discharge diagnosis Right foot wound debridement and delayed primary wound closure*Soft tissue infection   Sepsis   Medication alerts for this patient Home IV antibiotic infusion   Meds reviewed with patient/caregiver? Yes   Is the patient having any side effects they believe may be caused by any medication additions or changes? No   Is the patient taking all medications as directed (includes completed medication regime)? Yes   Does the patient have a primary care provider?  No   PCP Nursing Intervention --  [pt is waiting for Ins verification and then will find a PCP]   Has the patient kept scheduled appointments due by today? Yes   Has home health visited the patient within 72 hours of discharge? N/A   Psychosocial issues? No   Comments BS have been good, ranges around 150-170   What is the patient's perception of their health status since discharge? Improving   Nursing interventions Nurse provided patient education   Is the patient/caregiver able to teach back TIME? T emperature - higher or lower than normal, I nfection - may have signs and symptoms of an infection, M ental Decline - confused, sleepy, difficult to arouse, E xtremely Ill - severe pain, discomfort, shortness of breath   Nursing interventions Nurse provided patient education   Is patient/caregiver able to teach back steps to recovery at home? Set small, achievable goals for return to baseline health   Is the patient/caregiver able to teach back signs and symptoms of worsening condition: Altered mental status(confusion/coma), Shortness of breath/rapid respiratory rate, Fever, Hyperthermia   Is the patient/caregiver able to teach back the hierarchy of who to call/visit  for symptoms/problems? PCP, Specialist, Home health nurse, Urgent Care, ED, 911 Yes   Week 3 call completed? Yes   Graduated Yes   Is the patient interested in additional calls from an ambulatory ? No   Would this patient benefit from a Referral to [facility] Social Work? No   Wrap up additional comments Pt reports doing well, no concerns at this time   Call end time 1550            Jeana SAMS - Registered Nurse

## 2024-09-03 ENCOUNTER — OFFICE VISIT (OUTPATIENT)
Dept: WOUND CARE | Facility: HOSPITAL | Age: 44
End: 2024-09-03

## 2024-09-03 ENCOUNTER — OFFICE VISIT (OUTPATIENT)
Dept: INFECTIOUS DISEASES | Facility: CLINIC | Age: 44
End: 2024-09-03
Payer: MEDICAID

## 2024-09-03 ENCOUNTER — HOSPITAL ENCOUNTER (OUTPATIENT)
Dept: INFUSION THERAPY | Facility: HOSPITAL | Age: 44
Discharge: HOME | End: 2024-09-03

## 2024-09-03 VITALS
DIASTOLIC BLOOD PRESSURE: 75 MMHG | HEART RATE: 99 BPM | TEMPERATURE: 98.1 F | RESPIRATION RATE: 18 BRPM | OXYGEN SATURATION: 99 % | SYSTOLIC BLOOD PRESSURE: 121 MMHG

## 2024-09-03 VITALS
SYSTOLIC BLOOD PRESSURE: 125 MMHG | DIASTOLIC BLOOD PRESSURE: 79 MMHG | RESPIRATION RATE: 20 BRPM | HEART RATE: 97 BPM | TEMPERATURE: 97.1 F

## 2024-09-03 DIAGNOSIS — Z79.2 LONG TERM (CURRENT) USE OF ANTIBIOTICS: ICD-10-CM

## 2024-09-03 DIAGNOSIS — M86.179 ACUTE OSTEOMYELITIS OF FOOT: ICD-10-CM

## 2024-09-03 DIAGNOSIS — M86.8X7 OTHER OSTEOMYELITIS OF RIGHT FOOT: ICD-10-CM

## 2024-09-03 DIAGNOSIS — L02.611 ABSCESS OF RIGHT FOOT: Primary | ICD-10-CM

## 2024-09-03 DIAGNOSIS — A49.02 MRSA INFECTION: Primary | ICD-10-CM

## 2024-09-03 DIAGNOSIS — L02.611 FOOT ABSCESS, RIGHT: ICD-10-CM

## 2024-09-03 DIAGNOSIS — E11.65 UNCONTROLLED TYPE 2 DIABETES MELLITUS WITH HYPERGLYCEMIA: ICD-10-CM

## 2024-09-03 LAB
ALBUMIN SERPL-MCNC: 4.4 G/DL (ref 3.5–5.2)
ALBUMIN/GLOB SERPL: 1.6 G/DL
ALP SERPL-CCNC: 76 U/L (ref 39–117)
ALT SERPL W P-5'-P-CCNC: 14 U/L (ref 1–41)
ANION GAP SERPL CALCULATED.3IONS-SCNC: 10 MMOL/L (ref 5–15)
AST SERPL-CCNC: 10 U/L (ref 1–40)
BASOPHILS # BLD MANUAL: 0.03 10*3/MM3 (ref 0–0.2)
BASOPHILS NFR BLD MANUAL: 1 % (ref 0–1.5)
BILIRUB SERPL-MCNC: 1.5 MG/DL (ref 0–1.2)
BUN SERPL-MCNC: 17 MG/DL (ref 6–20)
BUN/CREAT SERPL: 23 (ref 7–25)
CALCIUM SPEC-SCNC: 9.2 MG/DL (ref 8.6–10.5)
CHLORIDE SERPL-SCNC: 97 MMOL/L (ref 98–107)
CO2 SERPL-SCNC: 26 MMOL/L (ref 22–29)
CREAT SERPL-MCNC: 0.74 MG/DL (ref 0.76–1.27)
CRP SERPL-MCNC: <0.3 MG/DL (ref 0–0.5)
DEPRECATED RDW RBC AUTO: 42.3 FL (ref 37–54)
EGFRCR SERPLBLD CKD-EPI 2021: 116 ML/MIN/1.73
EOSINOPHIL # BLD MANUAL: 0.2 10*3/MM3 (ref 0–0.4)
EOSINOPHIL NFR BLD MANUAL: 8 % (ref 0.3–6.2)
ERYTHROCYTE [DISTWIDTH] IN BLOOD BY AUTOMATED COUNT: 15 % (ref 12.3–15.4)
GLOBULIN UR ELPH-MCNC: 2.8 GM/DL
GLUCOSE BLDC GLUCOMTR-MCNC: 276 MG/DL (ref 70–130)
GLUCOSE SERPL-MCNC: 260 MG/DL (ref 65–99)
HCT VFR BLD AUTO: 39.9 % (ref 37.5–51)
HGB BLD-MCNC: 12.7 G/DL (ref 13–17.7)
LYMPHOCYTES # BLD MANUAL: 1.45 10*3/MM3 (ref 0.7–3.1)
LYMPHOCYTES NFR BLD MANUAL: 11 % (ref 5–12)
MCH RBC QN AUTO: 25.4 PG (ref 26.6–33)
MCHC RBC AUTO-ENTMCNC: 31.8 G/DL (ref 31.5–35.7)
MCV RBC AUTO: 79.8 FL (ref 79–97)
MONOCYTES # BLD: 0.28 10*3/MM3 (ref 0.1–0.9)
NEUTROPHILS # BLD AUTO: 0.55 10*3/MM3 (ref 1.7–7)
NEUTROPHILS NFR BLD MANUAL: 22 % (ref 42.7–76)
NRBC BLD AUTO-RTO: 0 /100 WBC (ref 0–0.2)
PLAT MORPH BLD: NORMAL
PLATELET # BLD AUTO: 247 10*3/MM3 (ref 140–450)
PMV BLD AUTO: 8.9 FL (ref 6–12)
POTASSIUM SERPL-SCNC: 4.1 MMOL/L (ref 3.5–5.2)
PROT SERPL-MCNC: 7.2 G/DL (ref 6–8.5)
RBC # BLD AUTO: 5 10*6/MM3 (ref 4.14–5.8)
RBC MORPH BLD: NORMAL
SODIUM SERPL-SCNC: 133 MMOL/L (ref 136–145)
VANCOMYCIN SERPL-MCNC: 8.6 MCG/ML (ref 5–40)
VARIANT LYMPHS NFR BLD MANUAL: 4 % (ref 0–5)
VARIANT LYMPHS NFR BLD MANUAL: 54 % (ref 19.6–45.3)
WBC MORPH BLD: NORMAL
WBC NRBC COR # BLD AUTO: 2.5 10*3/MM3 (ref 3.4–10.8)

## 2024-09-03 PROCEDURE — 99214 OFFICE O/P EST MOD 30 MIN: CPT | Performed by: INTERNAL MEDICINE

## 2024-09-03 PROCEDURE — G0463 HOSPITAL OUTPT CLINIC VISIT: HCPCS

## 2024-09-03 PROCEDURE — 36592 COLLECT BLOOD FROM PICC: CPT

## 2024-09-03 PROCEDURE — 85007 BL SMEAR W/DIFF WBC COUNT: CPT | Performed by: INTERNAL MEDICINE

## 2024-09-03 PROCEDURE — 80202 ASSAY OF VANCOMYCIN: CPT | Performed by: INTERNAL MEDICINE

## 2024-09-03 PROCEDURE — 82948 REAGENT STRIP/BLOOD GLUCOSE: CPT

## 2024-09-03 PROCEDURE — 85025 COMPLETE CBC W/AUTO DIFF WBC: CPT | Performed by: INTERNAL MEDICINE

## 2024-09-03 PROCEDURE — 86140 C-REACTIVE PROTEIN: CPT | Performed by: INTERNAL MEDICINE

## 2024-09-03 PROCEDURE — 80053 COMPREHEN METABOLIC PANEL: CPT | Performed by: INTERNAL MEDICINE

## 2024-09-03 RX ORDER — SODIUM CHLORIDE 0.9 % (FLUSH) 0.9 %
20 SYRINGE (ML) INJECTION AS NEEDED
OUTPATIENT
Start: 2024-09-09

## 2024-09-03 RX ORDER — DOXYCYCLINE 100 MG/1
100 CAPSULE ORAL 2 TIMES DAILY
Qty: 28 CAPSULE | Refills: 0 | Status: SHIPPED | OUTPATIENT
Start: 2024-09-03 | End: 2024-09-17

## 2024-09-03 RX ORDER — HEPARIN SODIUM (PORCINE) LOCK FLUSH IV SOLN 100 UNIT/ML 100 UNIT/ML
300 SOLUTION INTRAVENOUS ONCE
OUTPATIENT
Start: 2024-09-09

## 2024-09-03 RX ORDER — HEPARIN SODIUM (PORCINE) LOCK FLUSH IV SOLN 100 UNIT/ML 100 UNIT/ML
500 SOLUTION INTRAVENOUS AS NEEDED
OUTPATIENT
Start: 2024-09-09

## 2024-09-03 RX ORDER — SODIUM CHLORIDE 0.9 % (FLUSH) 0.9 %
10 SYRINGE (ML) INJECTION AS NEEDED
OUTPATIENT
Start: 2024-09-09

## 2024-09-03 NOTE — PROGRESS NOTES
"Patient refused to wear mask for PICC dressing change. Patient turned his head to opposite side during dressing change. Towards end of dressing change patient stated he was not feeling good, c/o ' bubbling in his ears and that I am going out\" . Patient's BP dropped to 65/50 with HR 75. Patient reclined in the chair and blood sugar was checked which was 276. Patient states he is diabetic and has been NPO for blood work.  Once patient was reclined and stimulated and after orange juice was provided, patient started feeling better. Blood pressure was rechecked 108/66 with HR 84. Significant other comes to room and states that he has done this before with PICC line dressing changes. Turkey sandwich and coffee given. Patient monitored for 15 minutes after event. No distress noted. Discharged per ambulation with significant other.  "

## 2024-09-03 NOTE — PROGRESS NOTES
ID CLINIC NOTE    CC: f/u R foot abscess and acute osteomyelitis due to MRSA     HPI: Shayy Ellison is a 43 y.o. male here for f/u R foot abscess and acute osteomyelitis due to MRSA. On 8/7/2024, he went to the operating room for foot debridement with podiatry.  Operative cultures grew MRSA. I reviewed the operative note and the plan tracked all the way down to bone so I recommended treating him as acute osteomyelitis with a 4-week course of antibiotics with stop date of today.  He has tolerated vancomycin without rash or diarrhea.  He says overall the foot is improving but he is not yet pain-free.  He says he has not scheduled a follow-up visit with his surgeon since discharge but will plan to do so today.        Past Medical History:   Diagnosis Date    Asthma     Diabetes        Past Surgical History:   Procedure Laterality Date    ANKLE SURGERY Left 2005    BONE BIOPSY LEG Right 8/7/2024    Procedure: Right fifth metatarsal bone biopsy;  Surgeon: Alexander Freeman DPM;  Location: [facility];  Service: Podiatry;  Laterality: Right;    INCISION AND DRAINAGE LEG Right 8/7/2024    Procedure: Right foot incision and drainage;  Surgeon: Alexander Freeman DPM;  Location: [facility];  Service: Podiatry;  Laterality: Right;    WOUND CLOSURE Right 8/9/2024    Procedure: Right foot wound debridement and delayed primary wound closure;  Surgeon: Alexander Freeman DPM;  Location: [facility];  Service: Podiatry;  Laterality: Right;     Social History:  Lives in [place], [place]    Works as a      Antibiotic allergies and intolerances:  None    Medications:     Current Outpatient Medications:     Continuous Blood Gluc  (FreeStyle Rochelle 14 Day [place]) device, Use 1 each Take As Directed., Disp: 1 each, Rfl: 0    insulin degludec (Tresiba FlexTouch) 100 UNIT/ML solution pen-injector injection, Inject 15 Units under the skin into the appropriate area as directed Daily., Disp: 15 mL, Rfl: 1    " Insulin Pen Needle 32G X 4 MM misc, Use to inject insulin under the skin via pens up to 4 times daily, Disp: 100 each, Rfl: 1    nicotine (NICODERM CQ) 21 MG/24HR patch, Place 1 patch on the skin as directed by provider Daily., Disp: 28 each, Rfl: 0    sertraline (Zoloft) 50 MG tablet, Take 1 tablet by mouth Daily, Disp: 60 tablet, Rfl: 0    vancomycin 1250 mg in sodium chloride 0.9% 250 mL (CD), Infuse 1,250 mg into a venous catheter Every 8 (Eight) Hours for 64 doses. Indications: Infection of the Skin and/or Soft Tissue, Disp: , Rfl:         OBJECTIVE:  /79   Pulse 97   Temp 97.1 °F (36.2 °C)   Resp 20     General: Awake, alert, NAD  Eyes: no scleral icterus  Cardiovascular: NR  Respiratory: normal work of breathing on RA without wheezing  GI: Abdomen is soft, not tender  Skin: Right foot incision is healed; no erythema or drainage; mild swelling  Vasc: PICC in LUE w/o erythema      DIAGNOSTICS:  CBC, BMP, VTr, CRP reviewed today  Lab Results   Component Value Date    WBC 2.50 (L) 09/03/2024    HGB 12.7 (L) 09/03/2024    HCT 39.9 09/03/2024     09/03/2024     Lab Results   Component Value Date    GLUCOSE 260 (H) 09/03/2024    CALCIUM 9.2 09/03/2024     (L) 09/03/2024    K 4.1 09/03/2024    CO2 26.0 09/03/2024    CL 97 (L) 09/03/2024    BUN 17 09/03/2024    CREATININE 0.74 (L) 09/03/2024    EGFRRESULT 111.6 12/07/2023    EGFR 116.0 09/03/2024    BCR 23.0 09/03/2024    ANIONGAP 10.0 09/03/2024     Lab Results   Component Value Date    VANCOTROUGH 13.80 08/11/2024    VANCORANDOM 8.60 09/03/2024     Lab Results   Component Value Date    CRP <0.30 09/03/2024     Lab Results   Component Value Date    HGBA1C 11.80 (H) 08/07/2024     Microbiology:  8/5 BCx: Negative  8/5 MRSA nares PCR: Positive  8/7 right foot operative cultures: MRSA (susc doxy, Bactrim, vanco)     Prior radiology:  MRI R Foot:   \"1. Plantar-lateral anterior hindfoot, midfoot, proximal forefoot   nonenhancing collection and " "presumed infected collection/abscess   predominantly centered within the abductor digiting minimi muscle and   extends from the level of the central to anterior calcaneus distally to   the coronal level of the junction of the mid-third and distal-third of   the 5th metatarsal. The collection contacts the plantar aspect of the   peroneus longus tendon where there is presumed infectious tenosynovitis   and there is extensive surrounding myositis. Generalized foot myositis   and diffuse cellulitis.   2. Bone marrow edema and enhancement within the base of the 5th   metatarsal and to a lesser degree within the plantar aspect of the   cuboid due to reactive marrow edema or early osteomyelitis.   3. 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   4. Suspect peroneus brevis split tear at the proximal margin of the   field-of-view of this exam. \"      ASSESSMENT/PLAN:  MRSA infection  Cellulitis, abscess, tenosynovitis, myositis, and acute osteomyelitis of the right foot  Long term use of antibiotic  MRSA carrier  Uncontrolled DM2    He's had a good clinical and laboratory response to operative debridement followed by 4 weeks of intravenous vancomycin.  DC PICC line and vancomycin today.  I am going to put him on doxycycline 100 mg p.o. twice daily x 14 more days.  We discussed signs and symptoms of recurrent infection, and he will call me and his surgeon if those were to occur.    Most importantly, I told him that he needs to follow-up with his surgeon ASAP for postoperative visit.  He says he never called to make the appointment after discharge.    Going forward, stricter glucose control will be of the utmost importance to promote healing and decrease risk of future infection.    "

## 2024-12-19 ENCOUNTER — OFFICE VISIT (OUTPATIENT)
Dept: FAMILY MEDICINE CLINIC | Facility: CLINIC | Age: 44
End: 2024-12-19
Payer: COMMERCIAL

## 2024-12-19 VITALS
BODY MASS INDEX: 26.36 KG/M2 | HEART RATE: 88 BPM | DIASTOLIC BLOOD PRESSURE: 76 MMHG | OXYGEN SATURATION: 98 % | SYSTOLIC BLOOD PRESSURE: 120 MMHG | HEIGHT: 69 IN | WEIGHT: 178 LBS

## 2024-12-19 DIAGNOSIS — F41.9 ANXIETY: ICD-10-CM

## 2024-12-19 DIAGNOSIS — E78.2 MIXED HYPERLIPIDEMIA: ICD-10-CM

## 2024-12-19 DIAGNOSIS — N52.9 ERECTILE DYSFUNCTION, UNSPECIFIED ERECTILE DYSFUNCTION TYPE: ICD-10-CM

## 2024-12-19 DIAGNOSIS — K21.9 GASTROESOPHAGEAL REFLUX DISEASE, UNSPECIFIED WHETHER ESOPHAGITIS PRESENT: ICD-10-CM

## 2024-12-19 DIAGNOSIS — E11.65 TYPE 2 DIABETES MELLITUS WITH HYPERGLYCEMIA, WITHOUT LONG-TERM CURRENT USE OF INSULIN: Primary | ICD-10-CM

## 2024-12-19 PROCEDURE — 99214 OFFICE O/P EST MOD 30 MIN: CPT | Performed by: NURSE PRACTITIONER

## 2024-12-19 RX ORDER — INSULIN DEGLUDEC 100 U/ML
15 INJECTION, SOLUTION SUBCUTANEOUS DAILY
Qty: 15 ML | Refills: 1 | Status: CANCELLED | OUTPATIENT
Start: 2024-12-19

## 2024-12-19 RX ORDER — SULFAMETHOXAZOLE AND TRIMETHOPRIM 800; 160 MG/1; MG/1
1 TABLET ORAL
COMMUNITY
Start: 2024-07-27 | End: 2024-12-19

## 2024-12-19 RX ORDER — SILDENAFIL 100 MG/1
100 TABLET, FILM COATED ORAL DAILY PRN
Qty: 60 TABLET | Refills: 1 | Status: SHIPPED | OUTPATIENT
Start: 2024-12-19

## 2024-12-19 NOTE — PROGRESS NOTES
Chief Complaint  Med Refill, Anxiety, and Diabetes    Subjective        Anxiety   Patient reports no chest pain, decreased concentration, dizziness, nausea, nervous/anxious behavior, shortness of breath or suicidal ideas.   Diabetes  Pertinent negatives for hypoglycemia include no dizziness, headaches or nervousness/anxiousness. Pertinent negatives for diabetes include no chest pain and no fatigue.      Manny presents to [facility] as a 43-year-old male for follow-up on chronic conditions, to refill medications.  Overall doing okay    Anxiety seems to be well-controlled.  He did self DC sertraline he feels that he is doing much better with changing occupation.  They Clines anything for the patient today.  Denies any SI or HI.  He is not currently counseling.    He has continued to have some rectal dysfunction.  He would like an increased dose of his Viagra.  He has previously tried 50 mg p.o. daily as needed.  He had no side effects  Blood pressure stable  No headaches no blurred vision no dizziness no flushing no chest pain or pressure      Type 2 diabetes   Since the last visit, he has overall felt well.  he has been compliant with current meds.  he denies medication side effects.  Glucose control with medication is patient poorly compliant and poorly controlled   The last HgbA1C result was   Lab Results   Component Value Date    HGBA1C 11.80 (H) 08/07/2024   .  The last dilated eye exam was 2023  He denies any polys.  He has been treated with osteomyelitis MRSA since the last office visit.  He was seen in follow-up with surgeon and infectious disease doctor.  He has stopped going to follow-up.  States that he still has some pain and tingling in his right foot.  He does plan to call and schedule follow-up with the specialist  Denies any fever, drainage or open ulceration  He has had a change in insurance-has not been taking his metformin.  He is agreeable to restarting that today.   "He is out of insulin pens.  States that he has still been taking his Tresiba 15 units daily  He does not regularly monitor his blood sugar.  Has not been using his freestyle mariza.  He did not bring a blood sugar log with him today      He is not fasting today and will return for labs      He has no other acute complaints    The following portions of the patient's history were reviewed and updated as appropriate: allergies, current medications, past family history, past medical history, past social history, past surgical history, and problem list    .      Review of Systems   Constitutional:  Negative for chills, fatigue and fever.   Eyes:  Negative for visual disturbance.   Respiratory:  Negative for cough, shortness of breath and wheezing.    Cardiovascular:  Negative for chest pain and leg swelling.   Gastrointestinal:  Negative for abdominal pain, diarrhea, nausea and vomiting.   Neurological:  Positive for numbness. Negative for dizziness.   Psychiatric/Behavioral:  Negative for decreased concentration, self-injury, sleep disturbance and suicidal ideas. The patient is not nervous/anxious.         Objective   Vital Signs:   Vitals:    12/19/24 1113   BP: 120/76   Pulse: 88   SpO2: 98%   Weight: 80.7 kg (178 lb)   Height: 175 cm (68.9\")   PainSc: 0-No pain            12/19/2024    11:15 AM   PHQ-2/PHQ-9 Depression Screening   Little interest or pleasure in doing things Not at all   Feeling down, depressed, or hopeless Not at all   How difficult have these problems made it for you to do your work, take care of things at home, or get along with other people? Not difficult at all               Physical Exam  Vitals reviewed.   Constitutional:       General: He is not in acute distress.  Eyes:      Conjunctiva/sclera: Conjunctivae normal.   Neck:      Thyroid: No thyromegaly.      Vascular: No carotid bruit.   Cardiovascular:      Rate and Rhythm: Normal rate and regular rhythm.      Heart sounds: Normal heart " sounds. No murmur heard.  Pulmonary:      Effort: Pulmonary effort is normal. No respiratory distress.      Breath sounds: Normal breath sounds. No stridor. No wheezing, rhonchi or rales.   Lymphadenopathy:      Cervical: No cervical adenopathy.   Neurological:      Mental Status: He is alert and oriented to person, place, and time.   Psychiatric:         Attention and Perception: Attention normal.         Mood and Affect: Mood normal.          Result Review :                Assessment and Plan       Anxiety  Stable declines need for any medication  Self DC'd sertraline  Orders:    Comprehensive metabolic panel    Lipid panel    CBC and Differential    TSH    Hemoglobin A1c    T4, Free    Testosterone, Free, Total    Type 2 diabetes mellitus with hyperglycemia, without long-term current use of insulin  Need to monitor blood sugar closely.  Please bring log to next visit  Please take all medication as directed  Restarting metformin continue Tresiba  Need updated hemoglobin A1c    Orders:    Comprehensive metabolic panel    Lipid panel    CBC and Differential    TSH    Hemoglobin A1c    T4, Free    Testosterone, Free, Total    Mixed hyperlipidemia   Lipid abnormalities are stable    Plan:  Continue same medication/s without change.      Discussed medication dosage, use, side effects, and goals of treatment in detail.    Counseled patient on lifestyle modifications to help control hyperlipidemia.     Patient Treatment Goals:   LDL goal is less than 70  LDL goal is under 100    Followup in 6 months.    Orders:    Comprehensive metabolic panel    Lipid panel    CBC and Differential    TSH    Hemoglobin A1c    T4, Free    Testosterone, Free, Total    Erectile dysfunction, unspecified erectile dysfunction type  Increase Viagra to 100 mg daily.  No side effects  Stressed taking the medication only as directed    Checking testosterone with labs  Will refer to urology at patient request  Orders:    Comprehensive metabolic  panel    Lipid panel    CBC and Differential    TSH    Hemoglobin A1c    T4, Free    Testosterone, Free, Total    Gastroesophageal reflux disease, unspecified whether esophagitis present  Avoid triggers including caffeine, nicotine, alcohol, spicy foods, red sauce     Orders:    Comprehensive metabolic panel    Lipid panel    CBC and Differential    TSH    Hemoglobin A1c    T4, Free    Testosterone, Free, Total          Follow Up   Return in about 3 months (around 3/19/2025), or if symptoms worsen or fail to improve, for Annual physical.  Patient was given instructions and counseling regarding his condition or for health maintenance advice. Please see specific information pulled into the AVS if appropriate.

## 2024-12-28 LAB
ALBUMIN SERPL-MCNC: 4.3 G/DL (ref 3.5–5.2)
ALBUMIN/GLOB SERPL: 1.7 G/DL
ALP SERPL-CCNC: 63 U/L (ref 39–117)
ALT SERPL-CCNC: 12 U/L (ref 1–41)
AST SERPL-CCNC: 16 U/L (ref 1–40)
BASOPHILS # BLD AUTO: 0.05 10*3/MM3 (ref 0–0.2)
BASOPHILS NFR BLD AUTO: 0.7 % (ref 0–1.5)
BILIRUB SERPL-MCNC: 1.2 MG/DL (ref 0–1.2)
BUN SERPL-MCNC: 19 MG/DL (ref 6–20)
BUN/CREAT SERPL: 19 (ref 7–25)
CALCIUM SERPL-MCNC: 9.1 MG/DL (ref 8.6–10.5)
CHLORIDE SERPL-SCNC: 99 MMOL/L (ref 98–107)
CHOLEST SERPL-MCNC: 232 MG/DL (ref 0–200)
CO2 SERPL-SCNC: 20.7 MMOL/L (ref 22–29)
CREAT SERPL-MCNC: 1 MG/DL (ref 0.76–1.27)
EGFRCR SERPLBLD CKD-EPI 2021: 95.8 ML/MIN/1.73
EOSINOPHIL # BLD AUTO: 0.19 10*3/MM3 (ref 0–0.4)
EOSINOPHIL NFR BLD AUTO: 2.6 % (ref 0.3–6.2)
ERYTHROCYTE [DISTWIDTH] IN BLOOD BY AUTOMATED COUNT: 14 % (ref 12.3–15.4)
GLOBULIN SER CALC-MCNC: 2.6 GM/DL
GLUCOSE SERPL-MCNC: 335 MG/DL (ref 65–99)
HBA1C MFR BLD: 9.9 % (ref 4.8–5.6)
HCT VFR BLD AUTO: 39.4 % (ref 37.5–51)
HDLC SERPL-MCNC: 42 MG/DL (ref 40–60)
HGB BLD-MCNC: 13.4 G/DL (ref 13–17.7)
IMM GRANULOCYTES # BLD AUTO: 0.02 10*3/MM3 (ref 0–0.05)
IMM GRANULOCYTES NFR BLD AUTO: 0.3 % (ref 0–0.5)
LDLC SERPL CALC-MCNC: 129 MG/DL (ref 0–100)
LYMPHOCYTES # BLD AUTO: 2.18 10*3/MM3 (ref 0.7–3.1)
LYMPHOCYTES NFR BLD AUTO: 29.9 % (ref 19.6–45.3)
MCH RBC QN AUTO: 28.6 PG (ref 26.6–33)
MCHC RBC AUTO-ENTMCNC: 34 G/DL (ref 31.5–35.7)
MCV RBC AUTO: 84 FL (ref 79–97)
MONOCYTES # BLD AUTO: 0.37 10*3/MM3 (ref 0.1–0.9)
MONOCYTES NFR BLD AUTO: 5.1 % (ref 5–12)
NEUTROPHILS # BLD AUTO: 4.47 10*3/MM3 (ref 1.7–7)
NEUTROPHILS NFR BLD AUTO: 61.4 % (ref 42.7–76)
NRBC BLD AUTO-RTO: 0 /100 WBC (ref 0–0.2)
PLATELET # BLD AUTO: 295 10*3/MM3 (ref 140–450)
POTASSIUM SERPL-SCNC: 4.3 MMOL/L (ref 3.5–5.2)
PROT SERPL-MCNC: 6.9 G/DL (ref 6–8.5)
RBC # BLD AUTO: 4.69 10*6/MM3 (ref 4.14–5.8)
SODIUM SERPL-SCNC: 136 MMOL/L (ref 136–145)
T4 FREE SERPL-MCNC: 1.37 NG/DL (ref 0.92–1.68)
TESTOST FREE SERPL-MCNC: 9 PG/ML (ref 6.8–21.5)
TESTOST SERPL-MCNC: 342 NG/DL (ref 264–916)
TRIGL SERPL-MCNC: 341 MG/DL (ref 0–150)
TSH SERPL DL<=0.005 MIU/L-ACNC: 0.54 UIU/ML (ref 0.27–4.2)
VLDLC SERPL CALC-MCNC: 61 MG/DL (ref 5–40)
WBC # BLD AUTO: 7.28 10*3/MM3 (ref 3.4–10.8)

## 2025-01-07 DIAGNOSIS — N52.9 ERECTILE DYSFUNCTION, UNSPECIFIED ERECTILE DYSFUNCTION TYPE: Primary | ICD-10-CM

## 2025-06-12 ENCOUNTER — HOSPITAL ENCOUNTER (EMERGENCY)
Facility: HOSPITAL | Age: 45
Discharge: HOME | End: 2025-06-12
Attending: EMERGENCY MEDICINE
Payer: OTHER MISCELLANEOUS

## 2025-06-12 VITALS
DIASTOLIC BLOOD PRESSURE: 79 MMHG | TEMPERATURE: 99 F | OXYGEN SATURATION: 98 % | HEART RATE: 95 BPM | RESPIRATION RATE: 18 BRPM | SYSTOLIC BLOOD PRESSURE: 138 MMHG

## 2025-06-12 DIAGNOSIS — L02.212 BACK ABSCESS: Primary | ICD-10-CM

## 2025-06-12 DIAGNOSIS — E11.65 UNCONTROLLED TYPE 2 DIABETES MELLITUS WITH HYPERGLYCEMIA: ICD-10-CM

## 2025-06-12 DIAGNOSIS — S80.812A ABRASION OF LEFT LOWER EXTREMITY, INITIAL ENCOUNTER: ICD-10-CM

## 2025-06-12 LAB
ALBUMIN SERPL-MCNC: 4.4 G/DL (ref 3.5–5.2)
ALBUMIN/GLOB SERPL: 1.3 G/DL
ALP SERPL-CCNC: 89 U/L (ref 39–117)
ALT SERPL W P-5'-P-CCNC: 16 U/L (ref 1–41)
ANION GAP SERPL CALCULATED.3IONS-SCNC: 12.8 MMOL/L (ref 5–15)
AST SERPL-CCNC: 16 U/L (ref 1–40)
BASOPHILS # BLD AUTO: 0.05 10*3/MM3 (ref 0–0.2)
BASOPHILS NFR BLD AUTO: 0.4 % (ref 0–1.5)
BILIRUB SERPL-MCNC: 1.3 MG/DL (ref 0–1.2)
BUN SERPL-MCNC: 13 MG/DL (ref 6–20)
BUN/CREAT SERPL: 13.5 (ref 7–25)
CALCIUM SPEC-SCNC: 9.2 MG/DL (ref 8.6–10.5)
CHLORIDE SERPL-SCNC: 95 MMOL/L (ref 98–107)
CO2 SERPL-SCNC: 23.2 MMOL/L (ref 22–29)
CREAT SERPL-MCNC: 0.96 MG/DL (ref 0.76–1.27)
D-LACTATE SERPL-SCNC: 1.2 MMOL/L (ref 0.5–2)
DEPRECATED RDW RBC AUTO: 37.8 FL (ref 37–54)
EGFRCR SERPLBLD CKD-EPI 2021: 100.6 ML/MIN/1.73
EOSINOPHIL # BLD AUTO: 0.14 10*3/MM3 (ref 0–0.4)
EOSINOPHIL NFR BLD AUTO: 1 % (ref 0.3–6.2)
ERYTHROCYTE [DISTWIDTH] IN BLOOD BY AUTOMATED COUNT: 12.2 % (ref 12.3–15.4)
GLOBULIN UR ELPH-MCNC: 3.3 GM/DL
GLUCOSE BLDC GLUCOMTR-MCNC: 417 MG/DL (ref 70–130)
GLUCOSE BLDC GLUCOMTR-MCNC: 450 MG/DL (ref 70–130)
GLUCOSE SERPL-MCNC: 425 MG/DL (ref 65–99)
HCT VFR BLD AUTO: 38.9 % (ref 37.5–51)
HGB BLD-MCNC: 12.9 G/DL (ref 13–17.7)
HOLD SPECIMEN: NORMAL
HOLD SPECIMEN: NORMAL
IMM GRANULOCYTES # BLD AUTO: 0.07 10*3/MM3 (ref 0–0.05)
IMM GRANULOCYTES NFR BLD AUTO: 0.5 % (ref 0–0.5)
LYMPHOCYTES # BLD AUTO: 1.68 10*3/MM3 (ref 0.7–3.1)
LYMPHOCYTES NFR BLD AUTO: 12.1 % (ref 19.6–45.3)
MCH RBC QN AUTO: 28.4 PG (ref 26.6–33)
MCHC RBC AUTO-ENTMCNC: 33.2 G/DL (ref 31.5–35.7)
MCV RBC AUTO: 85.5 FL (ref 79–97)
MONOCYTES # BLD AUTO: 0.86 10*3/MM3 (ref 0.1–0.9)
MONOCYTES NFR BLD AUTO: 6.2 % (ref 5–12)
NEUTROPHILS NFR BLD AUTO: 11.11 10*3/MM3 (ref 1.7–7)
NEUTROPHILS NFR BLD AUTO: 79.8 % (ref 42.7–76)
NRBC BLD AUTO-RTO: 0 /100 WBC (ref 0–0.2)
PLATELET # BLD AUTO: 322 10*3/MM3 (ref 140–450)
PMV BLD AUTO: 9.3 FL (ref 6–12)
POTASSIUM SERPL-SCNC: 3.9 MMOL/L (ref 3.5–5.2)
PROT SERPL-MCNC: 7.7 G/DL (ref 6–8.5)
RBC # BLD AUTO: 4.55 10*6/MM3 (ref 4.14–5.8)
SODIUM SERPL-SCNC: 131 MMOL/L (ref 136–145)
WBC NRBC COR # BLD AUTO: 13.91 10*3/MM3 (ref 3.4–10.8)
WHOLE BLOOD HOLD COAG: NORMAL
WHOLE BLOOD HOLD SPECIMEN: NORMAL

## 2025-06-12 PROCEDURE — 63710000001 INSULIN REGULAR HUMAN PER 5 UNITS: Performed by: EMERGENCY MEDICINE

## 2025-06-12 PROCEDURE — 80053 COMPREHEN METABOLIC PANEL: CPT | Performed by: EMERGENCY MEDICINE

## 2025-06-12 PROCEDURE — 82948 REAGENT STRIP/BLOOD GLUCOSE: CPT

## 2025-06-12 PROCEDURE — 25010000002 LIDOCAINE 1% - EPINEPHRINE 1:100000 1 %-1:100000 SOLUTION: Performed by: EMERGENCY MEDICINE

## 2025-06-12 PROCEDURE — 99283 EMERGENCY DEPT VISIT LOW MDM: CPT

## 2025-06-12 PROCEDURE — 85025 COMPLETE CBC W/AUTO DIFF WBC: CPT

## 2025-06-12 PROCEDURE — 83605 ASSAY OF LACTIC ACID: CPT

## 2025-06-12 PROCEDURE — 87040 BLOOD CULTURE FOR BACTERIA: CPT | Performed by: EMERGENCY MEDICINE

## 2025-06-12 PROCEDURE — 36415 COLL VENOUS BLD VENIPUNCTURE: CPT

## 2025-06-12 PROCEDURE — 36415 COLL VENOUS BLD VENIPUNCTURE: CPT | Performed by: EMERGENCY MEDICINE

## 2025-06-12 RX ORDER — GINSENG 100 MG
1 CAPSULE ORAL 2 TIMES DAILY
Qty: 14.2 G | Refills: 0 | Status: SHIPPED | OUTPATIENT
Start: 2025-06-12

## 2025-06-12 RX ORDER — SODIUM CHLORIDE 0.9 % (FLUSH) 0.9 %
10 SYRINGE (ML) INJECTION AS NEEDED
Status: DISCONTINUED | OUTPATIENT
Start: 2025-06-12 | End: 2025-06-12 | Stop reason: HOSPADM

## 2025-06-12 RX ORDER — SULFAMETHOXAZOLE AND TRIMETHOPRIM 800; 160 MG/1; MG/1
1 TABLET ORAL 2 TIMES DAILY
Qty: 14 TABLET | Refills: 0 | Status: SHIPPED | OUTPATIENT
Start: 2025-06-12 | End: 2025-06-19

## 2025-06-12 RX ORDER — CEPHALEXIN 500 MG/1
500 CAPSULE ORAL 4 TIMES DAILY
Qty: 28 CAPSULE | Refills: 0 | Status: SHIPPED | OUTPATIENT
Start: 2025-06-12 | End: 2025-06-19

## 2025-06-12 RX ORDER — LIDOCAINE HYDROCHLORIDE AND EPINEPHRINE 10; 10 MG/ML; UG/ML
10 INJECTION, SOLUTION INFILTRATION; PERINEURAL ONCE
Status: COMPLETED | OUTPATIENT
Start: 2025-06-12 | End: 2025-06-12

## 2025-06-12 RX ORDER — CEPHALEXIN 500 MG/1
500 CAPSULE ORAL ONCE
Status: COMPLETED | OUTPATIENT
Start: 2025-06-12 | End: 2025-06-12

## 2025-06-12 RX ORDER — SULFAMETHOXAZOLE AND TRIMETHOPRIM 800; 160 MG/1; MG/1
1 TABLET ORAL ONCE
Status: COMPLETED | OUTPATIENT
Start: 2025-06-12 | End: 2025-06-12

## 2025-06-12 RX ADMIN — CEPHALEXIN 500 MG: 500 CAPSULE ORAL at 16:48

## 2025-06-12 RX ADMIN — INSULIN HUMAN 5 UNITS: 100 INJECTION, SOLUTION PARENTERAL at 16:48

## 2025-06-12 RX ADMIN — LIDOCAINE HYDROCHLORIDE AND EPINEPHRINE 10 ML: 10; 10 INJECTION, SOLUTION INFILTRATION; PERINEURAL at 17:33

## 2025-06-12 RX ADMIN — SULFAMETHOXAZOLE AND TRIMETHOPRIM 1 TABLET: 800; 160 TABLET ORAL at 16:48

## 2025-06-12 NOTE — ED PROVIDER NOTES
EMERGENCY DEPARTMENT ENCOUNTER  Room Number:  12/12  PCP: Una Gonsales APRN  Independent Historians: Patient      HPI:  Chief Complaint: had concerns including Wound Check.     A complete HPI/ROS/PMH/PSH/SH/FH are unobtainable due to: None    Chronic or social conditions impacting patient care (Social Determinants of Health): None      Context: Shayy Ellison is a 44 y.o. male with a medical history of diabetes, sepsis, cellulitis, osteomyelitis who presents to the ED c/o acute leg and back infection.  The patient reports that he scraped his left leg on a ladder 1 week ago.  He states he went to the urgent care center and was told it was not infected.  He states that remains red warm.  He notes some drainage.  He states several days ago he developed a red swollen area to his left back.  Denies fevers.  He reports he is a diabetic.  He states that he does not take his insulin.  He reports a good sugar for him is in the 400s.  He states he has been told for 18 years by his doctor that sugars at this level are not good.  He states he feels fine with his sugars and high and if it goes any lower than he gets agitated.  He states he tries to remember to take his metformin.      Review of prior external notes (non-ED) -and- Review of prior external test results outside of this encounter: Laboratory evaluation 12/27/2024 shows a CMP with an elevated glucose of 335    Prescription drug monitoring program review:         PAST MEDICAL HISTORY  Active Ambulatory Problems     Diagnosis Date Noted    Soft tissue infection 08/05/2024    Sepsis 08/06/2024    Cellulitis of right foot 08/05/2024    Abscess of right foot 08/05/2024    Diabetic foot infection 08/06/2024    Osteomyelitis 08/14/2024     Resolved Ambulatory Problems     Diagnosis Date Noted    No Resolved Ambulatory Problems     Past Medical History:   Diagnosis Date    Asthma     Diabetes          PAST SURGICAL HISTORY  Past Surgical History:   Procedure Laterality  "Date    ANKLE SURGERY Left 2005    BONE BIOPSY LEG Right 8/7/2024    Procedure: Right fifth metatarsal bone biopsy;  Surgeon: Alexander Freeman DPM;  Location: [facility] MAIN OR;  Service: Podiatry;  Laterality: Right;    INCISION AND DRAINAGE LEG Right 8/7/2024    Procedure: Right foot incision and drainage;  Surgeon: Alexander Freeman DPM;  Location: [facility] MAIN OR;  Service: Podiatry;  Laterality: Right;    WOUND CLOSURE Right 8/9/2024    Procedure: Right foot wound debridement and delayed primary wound closure;  Surgeon: Alexander Freeman DPM;  Location: [facility] MAIN OR;  Service: Podiatry;  Laterality: Right;         FAMILY HISTORY  Family History   Problem Relation Age of Onset    Hypertension Mother     Diabetes Mother     Hypertension Father     Diabetes Father     Lung cancer Father     No Known Problems Brother     Malig Hyperthermia Neg Hx          SOCIAL HISTORY  Social History     Socioeconomic History    Marital status: Single   Tobacco Use    Smoking status: Every Day     Current packs/day: 1.50     Average packs/day: 1.5 packs/day for 15.0 years (22.5 ttl pk-yrs)     Types: Cigarettes     Passive exposure: Never    Smokeless tobacco: Never    Tobacco comments:     States he does not smoke cigarettes; states he \"vapes\" every day.   Vaping Use    Vaping status: Every Day    Substances: Flavoring    Devices: Refillable tank   Substance and Sexual Activity    Alcohol use: Not Currently    Drug use: Never    Sexual activity: Yes     Partners: Female         ALLERGIES  Pork-derived products and Latex      REVIEW OF SYSTEMS  Review of Systems  Included in HPI  All systems reviewed and negative except for those discussed in HPI.      PHYSICAL EXAM    I have reviewed the triage vital signs and nursing notes.    ED Triage Vitals   Temp Heart Rate Resp BP SpO2   06/12/25 1515 06/12/25 1515 06/12/25 1518 06/12/25 1515 06/12/25 1515   99.2 °F (37.3 °C) 108 18 144/76 97 %      Temp src Heart Rate Source Patient " Position BP Location FiO2 (%)   06/12/25 1515 -- -- 06/12/25 1515 --   Oral   Right arm        Physical Exam  GENERAL: Awake, alert, no acute distress, nontoxic-appearing  SKIN: Warm, dry  HENT: Normocephalic, atraumatic  EYES: no scleral icterus  CV: regular rhythm, regular rate  RESPIRATORY: normal effort, lungs clear  ABDOMEN: soft, nontender, nondistended  MUSCULOSKELETAL: no deformity.  Left shin with an area of abrasion anteriorly.  There is mild surrounding erythema although this looks more reactive than it looks infected.  There is no drainage.  There is no streaking.  Left back with a well-circumscribed area laterally that is consistent with an abscess.  There is a mild amount of drainage.  There is no significant surrounding cellulitis.  NEURO: alert, moves all extremities, follows commands            LAB RESULTS  Recent Results (from the past 24 hours)   Comprehensive Metabolic Panel    Collection Time: 06/12/25  3:40 PM    Specimen: Arm, Left; Blood   Result Value Ref Range    Glucose 425 (C) 65 - 99 mg/dL    BUN 13.0 6.0 - 20.0 mg/dL    Creatinine 0.96 0.76 - 1.27 mg/dL    Sodium 131 (L) 136 - 145 mmol/L    Potassium 3.9 3.5 - 5.2 mmol/L    Chloride 95 (L) 98 - 107 mmol/L    CO2 23.2 22.0 - 29.0 mmol/L    Calcium 9.2 8.6 - 10.5 mg/dL    Total Protein 7.7 6.0 - 8.5 g/dL    Albumin 4.4 3.5 - 5.2 g/dL    ALT (SGPT) 16 1 - 41 U/L    AST (SGOT) 16 1 - 40 U/L    Alkaline Phosphatase 89 39 - 117 U/L    Total Bilirubin 1.3 (H) 0.0 - 1.2 mg/dL    Globulin 3.3 gm/dL    A/G Ratio 1.3 g/dL    BUN/Creatinine Ratio 13.5 7.0 - 25.0    Anion Gap 12.8 5.0 - 15.0 mmol/L    eGFR 100.6 >60.0 mL/min/1.73   Lactic Acid, Plasma    Collection Time: 06/12/25  3:40 PM    Specimen: Arm, Left; Blood   Result Value Ref Range    Lactate 1.2 0.5 - 2.0 mmol/L   CBC Auto Differential    Collection Time: 06/12/25  3:40 PM    Specimen: Arm, Left; Blood   Result Value Ref Range    WBC 13.91 (H) 3.40 - 10.80 10*3/mm3    RBC 4.55 4.14 -  5.80 10*6/mm3    Hemoglobin 12.9 (L) 13.0 - 17.7 g/dL    Hematocrit 38.9 37.5 - 51.0 %    MCV 85.5 79.0 - 97.0 fL    MCH 28.4 26.6 - 33.0 pg    MCHC 33.2 31.5 - 35.7 g/dL    RDW 12.2 (L) 12.3 - 15.4 %    RDW-SD 37.8 37.0 - 54.0 fl    MPV 9.3 6.0 - 12.0 fL    Platelets 322 140 - 450 10*3/mm3    Neutrophil % 79.8 (H) 42.7 - 76.0 %    Lymphocyte % 12.1 (L) 19.6 - 45.3 %    Monocyte % 6.2 5.0 - 12.0 %    Eosinophil % 1.0 0.3 - 6.2 %    Basophil % 0.4 0.0 - 1.5 %    Immature Grans % 0.5 0.0 - 0.5 %    Neutrophils, Absolute 11.11 (H) 1.70 - 7.00 10*3/mm3    Lymphocytes, Absolute 1.68 0.70 - 3.10 10*3/mm3    Monocytes, Absolute 0.86 0.10 - 0.90 10*3/mm3    Eosinophils, Absolute 0.14 0.00 - 0.40 10*3/mm3    Basophils, Absolute 0.05 0.00 - 0.20 10*3/mm3    Immature Grans, Absolute 0.07 (H) 0.00 - 0.05 10*3/mm3    nRBC 0.0 0.0 - 0.2 /100 WBC   Green Top (Gel)    Collection Time: 06/12/25  3:40 PM   Result Value Ref Range    Extra Tube Hold for add-ons.    Lavender Top    Collection Time: 06/12/25  3:40 PM   Result Value Ref Range    Extra Tube hold for add-on    Gold Top - SST    Collection Time: 06/12/25  3:40 PM   Result Value Ref Range    Extra Tube Hold for add-ons.    Light Blue Top    Collection Time: 06/12/25  3:40 PM   Result Value Ref Range    Extra Tube Hold for add-ons.    POC Glucose Once    Collection Time: 06/12/25  6:10 PM    Specimen: Blood   Result Value Ref Range    Glucose 450 (C) 70 - 130 mg/dL   POC Glucose Once    Collection Time: 06/12/25  6:14 PM    Specimen: Blood   Result Value Ref Range    Glucose 417 (C) 70 - 130 mg/dL         RADIOLOGY  No Radiology Exams Resulted Within Past 24 Hours      MEDICATIONS GIVEN IN ER  Medications   sodium chloride 0.9 % flush 10 mL (has no administration in time range)   lidocaine 1% - EPINEPHrine 1:570017 (XYLOCAINE W/EPI) 1 %-1:842881 injection 10 mL (10 mL Injection Given 6/12/25 2073)   sulfamethoxazole-trimethoprim (BACTRIM DS,SEPTRA DS) 800-160 MG per tablet 1  tablet (1 tablet Oral Given 6/12/25 1648)   insulin regular (humuLIN R,novoLIN R) injection 5 Units (5 Units Subcutaneous Given 6/12/25 1648)   cephalexin (KEFLEX) capsule 500 mg (500 mg Oral Given 6/12/25 1648)         ORDERS PLACED DURING THIS VISIT:  Orders Placed This Encounter   Procedures    Incision & Drainage    Blood Culture - Blood,    Blood Culture - Blood,    Comprehensive Metabolic Panel    Lactic Acid, Plasma    Atalissa Draw    CBC Auto Differential    Undress & Gown    Continuous Pulse Oximetry    Vital Signs    Wound Care    Oxygen Therapy- Nasal Cannula; Titrate 1-6 LPM Per SpO2; 90 - 95%    POC Glucose STAT    POC Glucose Once    POC Glucose Once    Insert Peripheral IV    CBC & Differential    Green Top (Gel)    Lavender Top    Gold Top - SST    Light Blue Top         OUTPATIENT MEDICATION MANAGEMENT:  Current Facility-Administered Medications Ordered in Epic   Medication Dose Route Frequency Provider Last Rate Last Admin    sodium chloride 0.9 % flush 10 mL  10 mL Intravenous PRN Sherif Gu MD         Current Outpatient Medications Ordered in Epic   Medication Sig Dispense Refill    bacitracin 500 UNIT/GM ointment Apply 1 Application topically to the appropriate area as directed 2 (Two) Times a Day. 14.2 g 0    cephalexin (KEFLEX) 500 MG capsule Take 1 capsule by mouth 4 (Four) Times a Day for 7 days. 28 capsule 0    Continuous Blood Gluc  (LegalGuruyle Rochelle 14 Day [place]) device Use 1 each Take As Directed. 1 each 0    insulin degludec (Tresiba FlexTouch) 100 UNIT/ML solution pen-injector injection Inject 15 Units under the skin into the appropriate area as directed Daily. 15 mL 1    Insulin Pen Needle 32G X 4 MM misc Use to inject insulin under the skin via pens up to 4 times daily 100 each 1    metFORMIN (GLUCOPHAGE) 500 MG tablet Take 1 tablet by mouth 2 (Two) Times a Day With Meals. 180 tablet 1    nicotine (NICODERM CQ) 21 MG/24HR patch Place 1 patch on the skin as directed by  provider Daily. 28 each 0    sildenafil (Viagra) 100 MG tablet Take 1 tablet by mouth Daily As Needed for Erectile Dysfunction. 60 tablet 1    sulfamethoxazole-trimethoprim (BACTRIM DS,SEPTRA DS) 800-160 MG per tablet Take 1 tablet by mouth 2 (Two) Times a Day for 7 days. 14 tablet 0         PROCEDURES  Procedures            PROGRESS, DATA ANALYSIS, CONSULTS, AND MEDICAL DECISION MAKING  All labs have been independently interpreted by me.  All radiology studies have been reviewed by me. All EKG's have been independently viewed and interpreted by me.  Discussion below represents my analysis of pertinent findings related to patient's condition, differential diagnosis, treatment plan and final disposition.    Differential diagnosis includes but is not limited to cellulitis, abscess, sepsis, hyperglycemia, DKA.    Clinical Scores:                                       ED Course as of 06/12/25 1825   Thu Jun 12, 2025   1639 Lactate: 1.2 [TR]   1639 Glucose(!!): 425 [TR]   1639 CO2: 23.2 [TR]   1639 WBC(!): 13.91 [TR]   1739 I reviewed workup and findings with the patient at the bedside.  Answered all questions.  He states he has insulin at home but he does not take it.  He is adamant that his sugar is usually in the 400s.  He is anxious to go home.  He is afebrile.  He does have a leukocytosis of 14,000.  He has a normal lactic acid. He is not toxic appearing. I have given him some subcutaneous insulin to bring down his sugar.  I will start him on Bactrim and Keflex. [TR]   1820 Glucose(!!): 417 [TR]   1824 I reviewed the workup and findings with the patient.  His sugar has only come down a very slight amount with the subcutaneous insulin.  I was planning to give IV insulin and IV fluids but the patient refuses.  He states he wants to go home.  He states his blood sugar goes high when he does not eat and he has not eaten today.  While not ideal, plan discharge. [TR]      ED Course User Index  [TR] Sherif Gu MD              AS OF 18:25 EDT VITALS:    BP - 144/76  HR - 108  TEMP - 99.2 °F (37.3 °C) (Oral)  O2 SATS - 97%    COMPLEXITY OF CARE  Admission was considered but after careful review of the patient's presentation, physical examination, diagnostic results, and response to treatment the patient may be safely discharged with outpatient follow-up.      DIAGNOSIS  Final diagnoses:   Back abscess   Abrasion of left lower extremity, initial encounter   Uncontrolled type 2 diabetes mellitus with hyperglycemia         DISPOSITION  ED Disposition       ED Disposition   Discharge    Condition   Stable    Comment   --                Please note that portions of this document were completed with a voice recognition program.    Note Disclaimer: At [facility], we believe that sharing information builds trust and better relationships. You are receiving this note because you recently visited [facility]. It is possible you will see health information before a provider has talked with you about it. This kind of information can be easy to misunderstand. To help you fully understand what it means for your health, we urge you to discuss this note with your provider.         Sherif Gu MD  06/12/25 2622

## 2025-06-12 NOTE — Clinical Note
[facility] EMERGENCY DEPARTMENT  [address] [place] [address]  Phone: [phone]    Shayy Ellison was seen and treated in our emergency department on 6/12/2025.  He may return to work on 06/14/2025.         Thank you for choosing [facility].    Sherif Gu MD

## 2025-06-12 NOTE — ED PROVIDER NOTES
Incision & Drainage    Date/Time: 6/12/2025 5:25 PM    Performed by: Haresh Wren PA  Authorized by: Sherif Gu MD    Consent:     Consent obtained:  Verbal    Consent given by:  Patient  Universal protocol:     Patient identity confirmed:  Verbally with patient  Location:     Type:  Abscess    Location:  Trunk    Trunk location:  Back  Pre-procedure details:     Skin preparation:  Chlorhexidine  Anesthesia:     Anesthesia method:  Local infiltration    Local anesthetic:  Lidocaine 1% WITH epi  Procedure type:     Complexity:  Complex  Procedure details:     Incision types:  Cruciate    Incision depth:  Subcutaneous    Wound management:  Irrigated with saline and probed and deloculated    Drainage:  Purulent    Drainage amount:  Moderate    Packing materials:  1/4 in iodoform gauze  Post-procedure details:     Procedure completion:  Tolerated well, no immediate complications         Haresh Wren PA  06/12/25 2832

## 2025-06-12 NOTE — DISCHARGE INSTRUCTIONS
Antibiotics as prescribed.  Follow-up with your primary care doctor for further evaluation.  Return immediately for any fevers, worsening symptoms.

## 2025-06-17 LAB
BACTERIA SPEC AEROBE CULT: NORMAL
BACTERIA SPEC AEROBE CULT: NORMAL

## 2025-08-14 ENCOUNTER — OFFICE VISIT (OUTPATIENT)
Dept: FAMILY MEDICINE CLINIC | Facility: CLINIC | Age: 45
End: 2025-08-14
Payer: COMMERCIAL

## 2025-08-14 VITALS
TEMPERATURE: 98.4 F | SYSTOLIC BLOOD PRESSURE: 100 MMHG | BODY MASS INDEX: 26.45 KG/M2 | HEIGHT: 69 IN | DIASTOLIC BLOOD PRESSURE: 56 MMHG | HEART RATE: 73 BPM | OXYGEN SATURATION: 98 % | WEIGHT: 178.6 LBS

## 2025-08-14 DIAGNOSIS — L02.91 ABSCESS: ICD-10-CM

## 2025-08-14 DIAGNOSIS — N52.9 ERECTILE DYSFUNCTION, UNSPECIFIED ERECTILE DYSFUNCTION TYPE: ICD-10-CM

## 2025-08-14 DIAGNOSIS — M25.551 PAIN OF BOTH HIP JOINTS: Primary | ICD-10-CM

## 2025-08-14 DIAGNOSIS — M25.552 PAIN OF BOTH HIP JOINTS: Primary | ICD-10-CM

## 2025-08-14 DIAGNOSIS — E11.65 TYPE 2 DIABETES MELLITUS WITH HYPERGLYCEMIA, WITHOUT LONG-TERM CURRENT USE OF INSULIN: ICD-10-CM

## 2025-08-14 DIAGNOSIS — E78.2 MIXED HYPERLIPIDEMIA: ICD-10-CM

## 2025-08-14 DIAGNOSIS — K21.9 GASTROESOPHAGEAL REFLUX DISEASE, UNSPECIFIED WHETHER ESOPHAGITIS PRESENT: ICD-10-CM

## 2025-08-14 DIAGNOSIS — F41.9 ANXIETY: ICD-10-CM

## 2025-08-14 PROCEDURE — 99214 OFFICE O/P EST MOD 30 MIN: CPT | Performed by: NURSE PRACTITIONER

## 2025-08-14 RX ORDER — FLASH GLUCOSE SCANNING READER
1 EACH MISCELLANEOUS TAKE AS DIRECTED
Qty: 1 EACH | Refills: 0 | Status: SHIPPED | OUTPATIENT
Start: 2025-08-14

## 2025-08-14 RX ORDER — SULFAMETHOXAZOLE AND TRIMETHOPRIM 800; 160 MG/1; MG/1
1 TABLET ORAL 2 TIMES DAILY
Qty: 14 TABLET | Refills: 1 | Status: SHIPPED | OUTPATIENT
Start: 2025-08-14 | End: 2025-08-21

## 2025-08-14 RX ORDER — MELOXICAM 15 MG/1
15 TABLET ORAL DAILY
COMMUNITY
Start: 2025-06-19 | End: 2025-08-14 | Stop reason: SDUPTHER

## 2025-08-14 RX ORDER — SILDENAFIL 100 MG/1
100 TABLET, FILM COATED ORAL DAILY PRN
Qty: 60 TABLET | Refills: 1 | Status: SHIPPED | OUTPATIENT
Start: 2025-08-14

## 2025-08-14 RX ORDER — MELOXICAM 15 MG/1
15 TABLET ORAL DAILY
Qty: 90 TABLET | Refills: 2 | Status: SHIPPED | OUTPATIENT
Start: 2025-08-14

## 2025-08-17 DIAGNOSIS — E11.65 TYPE 2 DIABETES MELLITUS WITH HYPERGLYCEMIA, WITHOUT LONG-TERM CURRENT USE OF INSULIN: ICD-10-CM

## 2025-08-26 DIAGNOSIS — E11.65 TYPE 2 DIABETES MELLITUS WITH HYPERGLYCEMIA, WITHOUT LONG-TERM CURRENT USE OF INSULIN: ICD-10-CM

## (undated) DEVICE — GLV SURG PREMIERPRO ORTHO LTX PF SZ8 BRN

## (undated) DEVICE — BNDG,ELSTC,MATRIX,STRL,4"X5YD,LF,HOOK&LP: Brand: MEDLINE

## (undated) DEVICE — GLV SURG BIOGEL LTX PF 8

## (undated) DEVICE — PREMIUM WET SKIN PREP TRAY: Brand: MEDLINE INDUSTRIES, INC.

## (undated) DEVICE — SPNG LAP 18X18IN LF STRL PK/5

## (undated) DEVICE — SUT VIC 3/0 PS2 27IN J427H

## (undated) DEVICE — NDL HYPO PRECISIONGLIDE REG 25G 1 1/2

## (undated) DEVICE — SYR LL TP 10ML STRL

## (undated) DEVICE — BANDAGE,GAUZE,BULKEE II,4.5"X4.1YD,STRL: Brand: MEDLINE

## (undated) DEVICE — DRESSING,GAUZE,XEROFORM,CURAD,1"X8",ST: Brand: CURAD

## (undated) DEVICE — SUT VIC 2/0 X1 27IN J459H

## (undated) DEVICE — SUT ETHLN 4/0 PS2 PLSTC 1667G

## (undated) DEVICE — NDL BIOP BONE MARRW JAMSHIDI 11G 101MM

## (undated) DEVICE — SPNG GZ WOVN 4X4IN 12PLY 10/BX STRL

## (undated) DEVICE — BNDG ELAS CO-FLEX SLF ADHR 4IN5YD LF STRL

## (undated) DEVICE — PK ORTHO MINOR TOWER 40

## (undated) DEVICE — TRAP FLD MINIVAC MEGADYNE 100ML

## (undated) DEVICE — CULT AER/ANAEROB FASTIDIOUS BACT

## (undated) DEVICE — PENCL SMOKE/EVAC MEGADYNE TELESCP 10FT

## (undated) DEVICE — TOWEL,OR,DSP,ST,BLUE,STD,4/PK,20PK/CS: Brand: MEDLINE